# Patient Record
Sex: FEMALE | Race: OTHER | NOT HISPANIC OR LATINO | Employment: UNEMPLOYED | URBAN - METROPOLITAN AREA
[De-identification: names, ages, dates, MRNs, and addresses within clinical notes are randomized per-mention and may not be internally consistent; named-entity substitution may affect disease eponyms.]

---

## 2023-05-03 ENCOUNTER — OFFICE VISIT (OUTPATIENT)
Dept: PRIMARY CARE CLINIC | Facility: CLINIC | Age: 58
End: 2023-05-03
Payer: MEDICAID

## 2023-05-03 VITALS
TEMPERATURE: 98 F | DIASTOLIC BLOOD PRESSURE: 80 MMHG | HEIGHT: 65 IN | HEART RATE: 75 BPM | BODY MASS INDEX: 28.49 KG/M2 | RESPIRATION RATE: 16 BRPM | OXYGEN SATURATION: 98 % | SYSTOLIC BLOOD PRESSURE: 122 MMHG | WEIGHT: 171 LBS

## 2023-05-03 DIAGNOSIS — E03.9 ACQUIRED HYPOTHYROIDISM: ICD-10-CM

## 2023-05-03 DIAGNOSIS — E11.42 TYPE 2 DIABETES MELLITUS WITH DIABETIC POLYNEUROPATHY, WITH LONG-TERM CURRENT USE OF INSULIN: ICD-10-CM

## 2023-05-03 DIAGNOSIS — Z79.4 TYPE 2 DIABETES MELLITUS WITH DIABETIC POLYNEUROPATHY, WITH LONG-TERM CURRENT USE OF INSULIN: ICD-10-CM

## 2023-05-03 DIAGNOSIS — Z12.31 BREAST CANCER SCREENING BY MAMMOGRAM: Primary | ICD-10-CM

## 2023-05-03 DIAGNOSIS — G43.111 INTRACTABLE MIGRAINE WITH AURA WITH STATUS MIGRAINOSUS: ICD-10-CM

## 2023-05-03 PROBLEM — E11.618 TYPE 2 DIABETES MELLITUS WITH DIABETIC ARTHROPATHY, WITH LONG-TERM CURRENT USE OF INSULIN: Status: ACTIVE | Noted: 2023-05-03

## 2023-05-03 PROBLEM — N05.1 FOCAL SEGMENTAL GLOMERULOSCLEROSIS: Status: ACTIVE | Noted: 2023-05-03

## 2023-05-03 PROBLEM — U07.1 COVID-19: Status: ACTIVE | Noted: 2023-05-03

## 2023-05-03 PROCEDURE — 3079F PR MOST RECENT DIASTOLIC BLOOD PRESSURE 80-89 MM HG: ICD-10-PCS | Mod: CPTII,S$GLB,, | Performed by: INTERNAL MEDICINE

## 2023-05-03 PROCEDURE — 3008F BODY MASS INDEX DOCD: CPT | Mod: CPTII,S$GLB,, | Performed by: INTERNAL MEDICINE

## 2023-05-03 PROCEDURE — 1160F RVW MEDS BY RX/DR IN RCRD: CPT | Mod: CPTII,S$GLB,, | Performed by: INTERNAL MEDICINE

## 2023-05-03 PROCEDURE — 3079F DIAST BP 80-89 MM HG: CPT | Mod: CPTII,S$GLB,, | Performed by: INTERNAL MEDICINE

## 2023-05-03 PROCEDURE — 99204 OFFICE O/P NEW MOD 45 MIN: CPT | Mod: S$GLB,,, | Performed by: INTERNAL MEDICINE

## 2023-05-03 PROCEDURE — 1159F MED LIST DOCD IN RCRD: CPT | Mod: CPTII,S$GLB,, | Performed by: INTERNAL MEDICINE

## 2023-05-03 PROCEDURE — 99204 PR OFFICE/OUTPT VISIT, NEW, LEVL IV, 45-59 MIN: ICD-10-PCS | Mod: S$GLB,,, | Performed by: INTERNAL MEDICINE

## 2023-05-03 PROCEDURE — 1160F PR REVIEW ALL MEDS BY PRESCRIBER/CLIN PHARMACIST DOCUMENTED: ICD-10-PCS | Mod: CPTII,S$GLB,, | Performed by: INTERNAL MEDICINE

## 2023-05-03 PROCEDURE — 1159F PR MEDICATION LIST DOCUMENTED IN MEDICAL RECORD: ICD-10-PCS | Mod: CPTII,S$GLB,, | Performed by: INTERNAL MEDICINE

## 2023-05-03 PROCEDURE — 3074F SYST BP LT 130 MM HG: CPT | Mod: CPTII,S$GLB,, | Performed by: INTERNAL MEDICINE

## 2023-05-03 PROCEDURE — 3008F PR BODY MASS INDEX (BMI) DOCUMENTED: ICD-10-PCS | Mod: CPTII,S$GLB,, | Performed by: INTERNAL MEDICINE

## 2023-05-03 PROCEDURE — 3074F PR MOST RECENT SYSTOLIC BLOOD PRESSURE < 130 MM HG: ICD-10-PCS | Mod: CPTII,S$GLB,, | Performed by: INTERNAL MEDICINE

## 2023-05-03 PROCEDURE — 4010F PR ACE/ARB THEARPY RXD/TAKEN: ICD-10-PCS | Mod: CPTII,S$GLB,, | Performed by: INTERNAL MEDICINE

## 2023-05-03 PROCEDURE — 4010F ACE/ARB THERAPY RXD/TAKEN: CPT | Mod: CPTII,S$GLB,, | Performed by: INTERNAL MEDICINE

## 2023-05-03 RX ORDER — ESTRADIOL 1 MG/1
1 TABLET ORAL DAILY
COMMUNITY
Start: 2023-04-27 | End: 2023-05-12

## 2023-05-03 RX ORDER — HYDROCHLOROTHIAZIDE 25 MG/1
25 TABLET ORAL DAILY
COMMUNITY
Start: 2023-04-18 | End: 2023-06-28

## 2023-05-03 RX ORDER — TRAMADOL HYDROCHLORIDE 50 MG/1
1 TABLET ORAL EVERY 4 HOURS
COMMUNITY
Start: 2022-11-14 | End: 2023-05-03

## 2023-05-03 RX ORDER — VIT C/E/ZN/COPPR/LUTEIN/ZEAXAN 250MG-90MG
1 CAPSULE ORAL DAILY
COMMUNITY
End: 2024-01-25

## 2023-05-03 RX ORDER — OMEPRAZOLE 20 MG/1
20 CAPSULE, DELAYED RELEASE ORAL DAILY
COMMUNITY
Start: 2023-04-18 | End: 2023-08-25 | Stop reason: SDUPTHER

## 2023-05-03 RX ORDER — INSULIN ASPART 100 [IU]/ML
12 INJECTION, SOLUTION INTRAVENOUS; SUBCUTANEOUS
COMMUNITY
End: 2023-09-26 | Stop reason: SDUPTHER

## 2023-05-03 RX ORDER — BUSPIRONE HYDROCHLORIDE 10 MG/1
10 TABLET ORAL EVERY MORNING
COMMUNITY
Start: 2023-04-21

## 2023-05-03 RX ORDER — BENAZEPRIL HYDROCHLORIDE 40 MG/1
40 TABLET ORAL DAILY
COMMUNITY
Start: 2023-04-16

## 2023-05-03 RX ORDER — LEVOTHYROXINE SODIUM 125 UG/1
1 TABLET ORAL DAILY
COMMUNITY
Start: 2023-04-21 | End: 2023-08-23

## 2023-05-03 RX ORDER — GLIPIZIDE 5 MG/1
5 TABLET, FILM COATED, EXTENDED RELEASE ORAL DAILY
COMMUNITY
Start: 2023-04-16 | End: 2023-05-03

## 2023-05-03 RX ORDER — MONTELUKAST SODIUM 10 MG/1
1 TABLET ORAL NIGHTLY
COMMUNITY
Start: 2023-04-28

## 2023-05-03 RX ORDER — SERTRALINE HYDROCHLORIDE 100 MG/1
1 TABLET, FILM COATED ORAL DAILY
COMMUNITY
Start: 2023-04-21 | End: 2023-07-05

## 2023-05-03 RX ORDER — DULAGLUTIDE 1.5 MG/.5ML
1.5 INJECTION, SOLUTION SUBCUTANEOUS
COMMUNITY
End: 2024-01-25

## 2023-05-03 RX ORDER — LOSARTAN POTASSIUM 100 MG/1
1 TABLET ORAL DAILY
COMMUNITY
Start: 2023-04-27 | End: 2023-09-26

## 2023-05-03 RX ORDER — BUDESONIDE AND FORMOTEROL FUMARATE DIHYDRATE 160; 4.5 UG/1; UG/1
2 AEROSOL RESPIRATORY (INHALATION) 2 TIMES DAILY
COMMUNITY

## 2023-05-03 RX ORDER — METFORMIN HYDROCHLORIDE 500 MG/1
1000 TABLET, EXTENDED RELEASE ORAL 2 TIMES DAILY
COMMUNITY
Start: 2023-04-16 | End: 2023-05-03

## 2023-05-03 RX ORDER — ZINC GLUCONATE 50 MG
1 TABLET ORAL DAILY
COMMUNITY

## 2023-05-03 RX ORDER — INSULIN GLARGINE 100 [IU]/ML
30 INJECTION, SOLUTION SUBCUTANEOUS 2 TIMES DAILY
COMMUNITY
End: 2023-05-16 | Stop reason: SDUPTHER

## 2023-05-03 RX ORDER — ATORVASTATIN CALCIUM 40 MG/1
1 TABLET, FILM COATED ORAL DAILY
COMMUNITY
End: 2023-08-23

## 2023-05-03 NOTE — PROGRESS NOTES
Subjective:      Patient ID: Nati Buenrostro is a 57 y.o. female.    Chief Complaint: Establish Care ([facility])    HPI:  Patient with FSGS diagnosed in 2000.  Patient is being followed by nephrologist Dr Sanford in [place].     Patient has diabetes x 22 years.  Patient blood sugar do not seem under good control with last A1c of 10.9 in March 2023.  Patient is on Lantus 60 units + Novolog sliding scale.  Patient is also on Trulicity and glipizide.  Patient reports fasting blood sugars are running 270-320 and non-fasting BS = 300-400.  Patient reports multiple hypoglycemic episodes as well, sugars are not checked but patient reports feeling shaky, sweaty, tired and exhausted.  The symptoms improved after eating something.  The symptoms are more during the daytime specially if patient misses meals.  Patient also reports polyuria polydipsia and numbness bilateral fingers.     Patient has hypothyroidism and is on levothyroxine 125 mcg.  Thyroid levels are not available.  Patient denies tremor, weight loss, constipation, palpitation, patient reports diarrhea and having your fecal incontinence.  Patient also complains of abdominal pain and cramps.     Patient has migraine with aura and is currently taking tramadol and sumatriptan.                                       Review of Systems   Constitutional:  Positive for diaphoresis and malaise/fatigue. Negative for chills, fever and weight loss.   HENT:  Negative for congestion, ear pain, sinus pain, sore throat and tinnitus.    Eyes:  Negative for blurred vision and photophobia.   Respiratory:  Negative for cough, hemoptysis, shortness of breath and wheezing.    Cardiovascular:  Negative for chest pain, palpitations, orthopnea, leg swelling and PND.   Gastrointestinal:  Positive for diarrhea. Negative for abdominal pain, blood in stool, constipation, heartburn, melena, nausea and vomiting.   Genitourinary:  Negative for dysuria, frequency and urgency.   Musculoskeletal:   "Negative for back pain, myalgias and neck pain.   Skin:  Negative for rash.   Neurological:  Positive for dizziness. Negative for tremors, seizures, loss of consciousness and weakness.   Endo/Heme/Allergies:  Negative for polydipsia.   Psychiatric/Behavioral:  Negative for depression and hallucinations. The patient does not have insomnia.    Objective:   /80 (BP Location: Left arm, Patient Position: Sitting, BP Method: Medium (Automatic))   Pulse 75   Temp 98 °F (36.7 °C) (Temporal)   Resp 16   Ht 5' 5" (1.651 m)   Wt 77.6 kg (171 lb) Comment: without boot 168 lbs  SpO2 98%   BMI 28.46 kg/m²     Physical Exam  Constitutional:       General: She is not in acute distress.     Appearance: She is not diaphoretic.   Neck:      Thyroid: No thyromegaly.   Cardiovascular:      Rate and Rhythm: Normal rate and regular rhythm.      Heart sounds: Normal heart sounds. No murmur heard.  Pulmonary:      Effort: Pulmonary effort is normal. No respiratory distress.      Breath sounds: Normal breath sounds. No wheezing.   Abdominal:      General: Bowel sounds are normal. There is no distension.      Palpations: Abdomen is soft.      Tenderness: There is no abdominal tenderness.   Musculoskeletal:      Comments: Right foot is in Unna boot   Lymphadenopathy:      Cervical: No cervical adenopathy.   Neurological:      Mental Status: She is alert and oriented to person, place, and time.   Psychiatric:         Behavior: Behavior normal.         Thought Content: Thought content normal.         Judgment: Judgment normal.     Assessment:       ICD-10-CM ICD-9-CM   1. Breast cancer screening by mammogram  Z12.31 V76.12   2. Acquired hypothyroidism  E03.9 244.9   3. Type 2 diabetes mellitus with diabetic polyneuropathy, with long-term current use of insulin  E11.42 250.60    Z79.4 357.2     V58.67   4. Intractable migraine with aura with status migrainosus  G43.111 346.03       Plan:     Patient has diabetes with last A1c of 10.9 " suggest very poorly controlled diabetes  Will divide Lantus to 30 units b.i.d. instead of 60 once daily  Patient has multiple GI symptoms including abdominal cramps and fecal incontinence.  Will hold metformin  Will stop glipizide  Will use NovoLog 5 units before each meal and sliding scale  Will place CGM to better understand patient blood sugar pattern and adjust the dose of insulin  Patient has hypothyroidism and TSH levels are not available.  Will order thyroid function  Patient last LDL of 130 is not at goal.  Patient is on atorvastatin will continue same medicine for now  Patient has migraine that do not seem under good control.  Will discuss using topiramate for prophylaxis on next visit  Advised patient to avoid tramadol for migraine  Advised patient about multiple side effect of estradiol and will hold the medication    Medication List with Changes/Refills   Current Medications    ALBUTEROL SULFATE 90 MCG/ACTUATION AEBS    Inhale 2 puffs into the lungs daily as needed.    ATORVASTATIN (LIPITOR) 40 MG TABLET    Take 1 tablet by mouth once daily.    BENAZEPRIL (LOTENSIN) 40 MG TABLET    Take 40 mg by mouth once daily.    BUDESONIDE-FORMOTEROL 160-4.5 MCG (SYMBICORT) 160-4.5 MCG/ACTUATION HFAA    Inhale 2 puffs into the lungs 2 (two) times a day.    BUSPIRONE (BUSPAR) 10 MG TABLET    Take 10 mg by mouth every morning.    CHOLECALCIFEROL, VITAMIN D3, (VITAMIN D3) 25 MCG (1,000 UNIT) CAPSULE    Take 1 capsule by mouth once daily.    DICYCLOMINE HCL (DICYCLOMINE ORAL)    Take 1 tablet by mouth once daily.    DULAGLUTIDE (TRULICITY) 1.5 MG/0.5 ML PEN INJECTOR    Inject 1.5 mg into the skin every 7 days.    ESTRADIOL (ESTRACE) 1 MG TABLET    Take 1 tablet by mouth once daily.    GLIPIZIDE 5 MG TR24    Take 5 mg by mouth once daily.    HYDROCHLOROTHIAZIDE (HYDRODIURIL) 25 MG TABLET    Take 25 mg by mouth once daily.    INSULIN ASPART U-100 (NOVOLOG) 100 UNIT/ML (3 ML) INPN PEN    Inject into the skin. Use per  sliding scale    INSULIN GLARGINE 100 UNITS/ML SUBQ PEN    Inject 60 Units into the skin once daily.    LEVOTHYROXINE (SYNTHROID) 125 MCG TABLET    Take 1 tablet by mouth once daily.    LOSARTAN (COZAAR) 100 MG TABLET    Take 1 tablet by mouth once daily.    MAGNESIUM OXIDE 500 MG CAP    Take 1 tablet by mouth once daily.    MECLIZINE HCL (MECLIZINE ORAL)    Take 1 tablet by mouth daily as needed.    METFORMIN (GLUCOPHAGE-XR) 500 MG ER 24HR TABLET    Take 1,000 mg by mouth 2 (two) times daily.    MONTELUKAST (SINGULAIR) 10 MG TABLET    Take 1 tablet by mouth every evening.    OMEPRAZOLE (PRILOSEC) 20 MG CAPSULE    Take 20 mg by mouth once daily.    SERTRALINE (ZOLOFT) 100 MG TABLET    Take 1 tablet by mouth once daily.    SUMATRIPTAN, BULK, MISC    1 tablet by Misc.(Non-Drug; Combo Route) route as needed.    TRAMADOL (ULTRAM) 50 MG TABLET    Take 1 tablet by mouth every 4 (four) hours.

## 2023-05-12 ENCOUNTER — OFFICE VISIT (OUTPATIENT)
Dept: PRIMARY CARE CLINIC | Facility: CLINIC | Age: 58
End: 2023-05-12
Payer: MEDICAID

## 2023-05-12 VITALS
SYSTOLIC BLOOD PRESSURE: 121 MMHG | RESPIRATION RATE: 16 BRPM | HEART RATE: 70 BPM | HEIGHT: 65 IN | TEMPERATURE: 97 F | OXYGEN SATURATION: 99 % | WEIGHT: 169 LBS | BODY MASS INDEX: 28.16 KG/M2 | DIASTOLIC BLOOD PRESSURE: 80 MMHG

## 2023-05-12 DIAGNOSIS — E11.42 TYPE 2 DIABETES MELLITUS WITH DIABETIC POLYNEUROPATHY, WITH LONG-TERM CURRENT USE OF INSULIN: Primary | ICD-10-CM

## 2023-05-12 DIAGNOSIS — Z79.4 TYPE 2 DIABETES MELLITUS WITH DIABETIC POLYNEUROPATHY, WITH LONG-TERM CURRENT USE OF INSULIN: Primary | ICD-10-CM

## 2023-05-12 PROCEDURE — 4010F ACE/ARB THERAPY RXD/TAKEN: CPT | Mod: CPTII,S$GLB,, | Performed by: INTERNAL MEDICINE

## 2023-05-12 PROCEDURE — 99214 OFFICE O/P EST MOD 30 MIN: CPT | Mod: 25,S$GLB,, | Performed by: INTERNAL MEDICINE

## 2023-05-12 PROCEDURE — 3008F BODY MASS INDEX DOCD: CPT | Mod: CPTII,S$GLB,, | Performed by: INTERNAL MEDICINE

## 2023-05-12 PROCEDURE — 1160F RVW MEDS BY RX/DR IN RCRD: CPT | Mod: CPTII,S$GLB,, | Performed by: INTERNAL MEDICINE

## 2023-05-12 PROCEDURE — 1159F MED LIST DOCD IN RCRD: CPT | Mod: CPTII,S$GLB,, | Performed by: INTERNAL MEDICINE

## 2023-05-12 PROCEDURE — 3074F PR MOST RECENT SYSTOLIC BLOOD PRESSURE < 130 MM HG: ICD-10-PCS | Mod: CPTII,S$GLB,, | Performed by: INTERNAL MEDICINE

## 2023-05-12 PROCEDURE — 3008F PR BODY MASS INDEX (BMI) DOCUMENTED: ICD-10-PCS | Mod: CPTII,S$GLB,, | Performed by: INTERNAL MEDICINE

## 2023-05-12 PROCEDURE — 1159F PR MEDICATION LIST DOCUMENTED IN MEDICAL RECORD: ICD-10-PCS | Mod: CPTII,S$GLB,, | Performed by: INTERNAL MEDICINE

## 2023-05-12 PROCEDURE — 3074F SYST BP LT 130 MM HG: CPT | Mod: CPTII,S$GLB,, | Performed by: INTERNAL MEDICINE

## 2023-05-12 PROCEDURE — 99214 PR OFFICE/OUTPT VISIT, EST, LEVL IV, 30-39 MIN: ICD-10-PCS | Mod: 25,S$GLB,, | Performed by: INTERNAL MEDICINE

## 2023-05-12 PROCEDURE — 3079F PR MOST RECENT DIASTOLIC BLOOD PRESSURE 80-89 MM HG: ICD-10-PCS | Mod: CPTII,S$GLB,, | Performed by: INTERNAL MEDICINE

## 2023-05-12 PROCEDURE — 3079F DIAST BP 80-89 MM HG: CPT | Mod: CPTII,S$GLB,, | Performed by: INTERNAL MEDICINE

## 2023-05-12 PROCEDURE — 95251 CONT GLUC MNTR ANALYSIS I&R: CPT | Mod: S$GLB,,, | Performed by: INTERNAL MEDICINE

## 2023-05-12 PROCEDURE — 4010F PR ACE/ARB THEARPY RXD/TAKEN: ICD-10-PCS | Mod: CPTII,S$GLB,, | Performed by: INTERNAL MEDICINE

## 2023-05-12 PROCEDURE — 95251 PR GLUCOSE MONITOR, 72 HOUR, PHYS INTERP: ICD-10-PCS | Mod: S$GLB,,, | Performed by: INTERNAL MEDICINE

## 2023-05-12 PROCEDURE — 1160F PR REVIEW ALL MEDS BY PRESCRIBER/CLIN PHARMACIST DOCUMENTED: ICD-10-PCS | Mod: CPTII,S$GLB,, | Performed by: INTERNAL MEDICINE

## 2023-05-12 RX ORDER — DAPAGLIFLOZIN 5 MG/1
5 TABLET, FILM COATED ORAL DAILY
Qty: 30 TABLET | Refills: 0 | Status: SHIPPED | OUTPATIENT
Start: 2023-05-12 | End: 2023-06-28 | Stop reason: SINTOL

## 2023-05-12 NOTE — PROGRESS NOTES
Subjective:      Patient ID: Nati Buenrostro is a 57 y.o. female.    Chief Complaint: Follow-up (F/u requested referral to podiatry )     Patient with FSGS diagnosed in 2000.  Patient is being followed by nephrologist Dr Sanford in [place].     Patient with diabetes for last 22 years.  Patient last A1c of 10.9 suggest poorly-controlled diabetes.  Patient was evaluated in clinic about 10 days ago for the 1st time and a few changes were made.  Patient Lantus 60 units was split to 30 units twice a day, was started NovoLog 5 units before each meal + continue Trulicity + stop glipizide.  CGM was placed and the data is downloaded today and it suggest that patient blood sugars are in range 20% of the time and running high 60% of the time and very high 20% of the time.  Patient average blood sugar is 217 with standard deviation of 43.     Patient was having fecal incontinence and abdominal cramps and those have markedly improved after metformin is stopped    Review of Systems   Constitutional:  Negative for chills, diaphoresis, fever, malaise/fatigue and weight loss.   HENT:  Negative for congestion, ear pain, sinus pain, sore throat and tinnitus.    Eyes:  Negative for blurred vision and photophobia.   Respiratory:  Negative for cough, hemoptysis, shortness of breath and wheezing.    Cardiovascular:  Negative for chest pain, palpitations, orthopnea, leg swelling and PND.   Gastrointestinal:  Negative for abdominal pain, blood in stool, constipation, diarrhea, heartburn, melena, nausea and vomiting.   Genitourinary:  Negative for dysuria, frequency and urgency.   Musculoskeletal:  Negative for back pain, myalgias and neck pain.   Skin:  Negative for rash.   Neurological:  Negative for dizziness, tremors, seizures, loss of consciousness and weakness.   Endo/Heme/Allergies:  Negative for polydipsia.   Psychiatric/Behavioral:  Negative for depression and hallucinations. The patient does not have insomnia.      Objective:   BP  "121/80 (BP Location: Right arm, Patient Position: Sitting, BP Method: Medium (Automatic))   Pulse 70   Temp 97 °F (36.1 °C) (Temporal)   Resp 16   Ht 5' 5" (1.651 m)   Wt 76.7 kg (169 lb)   SpO2 99%   BMI 28.12 kg/m²     Patient not examined    Assessment:       ICD-10-CM ICD-9-CM   1. Type 2 diabetes mellitus with diabetic polyneuropathy, with long-term current use of insulin  E11.42 250.60    Z79.4 357.2     V58.67       Plan:     Patient blood sugars seem to be better controlled than before but still running high  Will increase Lantus to 32 units twice a day  Patient seem to have a spike in blood sugar at 3 p.m. after lunch.  That is patient's biggest meal  Advised patient to take 8 units of NovoLog before lunch.   Will continue 5 units before breakfast and 5 units before dinner  Will add Farxiga that will help patient better control blood sugar and may also help with weight loss  Patient is on multiple insulin injection and not think patient will be helped markedly with continuous glucose monitor  Will try to get patient dex com sensor.     Medication List with Changes/Refills   New Medications    DAPAGLIFLOZIN (FARXIGA) 5 MG TAB TABLET    Take 1 tablet (5 mg total) by mouth once daily.   Current Medications    ALBUTEROL SULFATE 90 MCG/ACTUATION AEBS    Inhale 2 puffs into the lungs daily as needed.    ATORVASTATIN (LIPITOR) 40 MG TABLET    Take 1 tablet by mouth once daily.    BENAZEPRIL (LOTENSIN) 40 MG TABLET    Take 40 mg by mouth once daily.    BUDESONIDE-FORMOTEROL 160-4.5 MCG (SYMBICORT) 160-4.5 MCG/ACTUATION HFAA    Inhale 2 puffs into the lungs 2 (two) times a day.    BUSPIRONE (BUSPAR) 10 MG TABLET    Take 10 mg by mouth every morning.    CHOLECALCIFEROL, VITAMIN D3, (VITAMIN D3) 25 MCG (1,000 UNIT) CAPSULE    Take 1 capsule by mouth once daily.    DICYCLOMINE HCL (DICYCLOMINE ORAL)    Take 1 tablet by mouth once daily.    DULAGLUTIDE (TRULICITY) 1.5 MG/0.5 ML PEN INJECTOR    Inject 1.5 mg into " the skin every 7 days.    ESTRADIOL (ESTRACE) 1 MG TABLET    Take 1 tablet by mouth once daily.    HYDROCHLOROTHIAZIDE (HYDRODIURIL) 25 MG TABLET    Take 25 mg by mouth once daily.    INSULIN ASPART U-100 (NOVOLOG) 100 UNIT/ML (3 ML) INPN PEN    Inject 5 Units into the skin 3 (three) times daily with meals.    INSULIN GLARGINE 100 UNITS/ML SUBQ PEN    Inject 30 Units into the skin 2 (two) times a day.    LEVOTHYROXINE (SYNTHROID) 125 MCG TABLET    Take 1 tablet by mouth once daily.    LOSARTAN (COZAAR) 100 MG TABLET    Take 1 tablet by mouth once daily.    MAGNESIUM OXIDE 500 MG CAP    Take 1 tablet by mouth once daily.    MECLIZINE HCL (MECLIZINE ORAL)    Take 1 tablet by mouth daily as needed.    MONTELUKAST (SINGULAIR) 10 MG TABLET    Take 1 tablet by mouth every evening.    OMEPRAZOLE (PRILOSEC) 20 MG CAPSULE    Take 20 mg by mouth once daily.    SERTRALINE (ZOLOFT) 100 MG TABLET    Take 1 tablet by mouth once daily.    SUMATRIPTAN, BULK, MISC    1 tablet by Misc.(Non-Drug; Combo Route) route as needed.

## 2023-05-16 DIAGNOSIS — Z79.4 TYPE 2 DIABETES MELLITUS WITH DIABETIC POLYNEUROPATHY, WITH LONG-TERM CURRENT USE OF INSULIN: Primary | ICD-10-CM

## 2023-05-16 DIAGNOSIS — E11.42 TYPE 2 DIABETES MELLITUS WITH DIABETIC POLYNEUROPATHY, WITH LONG-TERM CURRENT USE OF INSULIN: Primary | ICD-10-CM

## 2023-05-16 NOTE — TELEPHONE ENCOUNTER
----- Message from Anu Gomez LPN sent at 5/16/2023  4:48 PM CDT -----  Regarding: FW: medication  Contact: patient    ----- Message -----  From: Ilana Clark  Sent: 5/16/2023   4:06 PM CDT  To: Alphonse Montalvo Staff  Subject: medication                                       PT is stating she needs a refill on her insulin medictaion, it is called Cayla Jackson, return call [phone]        [facility] [address] [place] [address] - [address] [facility]  [address] [facility] [address]  Phone: [phone] Fax: [phone]

## 2023-05-17 ENCOUNTER — TELEPHONE (OUTPATIENT)
Dept: PRIMARY CARE CLINIC | Facility: CLINIC | Age: 58
End: 2023-05-17
Payer: MEDICAID

## 2023-05-17 DIAGNOSIS — E11.42 TYPE 2 DIABETES MELLITUS WITH DIABETIC POLYNEUROPATHY, WITH LONG-TERM CURRENT USE OF INSULIN: Primary | ICD-10-CM

## 2023-05-17 DIAGNOSIS — Z79.4 TYPE 2 DIABETES MELLITUS WITH DIABETIC POLYNEUROPATHY, WITH LONG-TERM CURRENT USE OF INSULIN: Primary | ICD-10-CM

## 2023-05-17 RX ORDER — INSULIN GLARGINE 100 [IU]/ML
30 INJECTION, SOLUTION SUBCUTANEOUS 2 TIMES DAILY
Qty: 18 ML | Refills: 11 | Status: SHIPPED | OUTPATIENT
Start: 2023-05-17 | End: 2023-09-25 | Stop reason: SDUPTHER

## 2023-05-19 ENCOUNTER — TELEPHONE (OUTPATIENT)
Dept: PRIMARY CARE CLINIC | Facility: CLINIC | Age: 58
End: 2023-05-19
Payer: MEDICAID

## 2023-05-19 NOTE — TELEPHONE ENCOUNTER
----- Message from Ilana Clark sent at 5/19/2023 11:29 AM CDT -----  Regarding: medication  Contact: patient  PT is calling bc she sent in a request for a refill of her insulin but the insurance is wanting to speak with a provider, she states she is out and can not go through the weekend without her insulin, return call [phone]

## 2023-05-23 ENCOUNTER — TELEPHONE (OUTPATIENT)
Dept: PRIMARY CARE CLINIC | Facility: CLINIC | Age: 58
End: 2023-05-23
Payer: MEDICAID

## 2023-05-23 ENCOUNTER — PATIENT OUTREACH (OUTPATIENT)
Dept: ADMINISTRATIVE | Facility: HOSPITAL | Age: 58
End: 2023-05-23
Payer: MEDICAID

## 2023-05-23 NOTE — TELEPHONE ENCOUNTER
Pt was called to see if she received her insulin- insulin glargine solostar sent into pharmacy-informed that the lantus solostar which is what pt states  she gets from the pharmacy will be ready to  shortly --

## 2023-05-23 NOTE — TELEPHONE ENCOUNTER
----- Message from Juani Arriaga MA sent at 5/22/2023  4:58 PM CDT -----  Contact: pt    ----- Message -----  From: Leonor Newman LPN  Sent: 5/22/2023   4:30 PM CDT  To: Juani Arriaga MA      ----- Message -----  From: Marianna Garza  Sent: 5/22/2023   3:20 PM CDT  To: Alphonse Montalvo Staff    Pt calling about referral sent to wrong number it needs go to Dr Scott Pacheco at fax number [phone].  Pt can be reached at [phone]    Thanks,

## 2023-05-23 NOTE — TELEPHONE ENCOUNTER
Returned call to patient and advised that clarification given for lantus. Pharmacy tech previously states that pa was needed, per pharmacist medication does not need pa, verbal was needed for brand name.

## 2023-05-23 NOTE — PROGRESS NOTES
Diabetic Eye Exam Report: Spoke with patient about overdue Eye Exam, Pt says thanks for the reminder I will call Dr. Bennett tomorrow. Unable to update Teams.

## 2023-05-23 NOTE — TELEPHONE ENCOUNTER
----- Message from Marianna Garza sent at 5/23/2023  2:28 PM CDT -----  Contact: pt  Pt calling for status of refill for Lantus pt has been out for few days.   Pt stated she needs a p/a and she can be reached at [phone]       [facility] [address] [place] [address] - [address] [facility]  [address] [facility] [address]  Phone: [phone] Fax: [phone]    Thanks,

## 2023-05-24 ENCOUNTER — TELEPHONE (OUTPATIENT)
Dept: PRIMARY CARE CLINIC | Facility: CLINIC | Age: 58
End: 2023-05-24
Payer: MEDICAID

## 2023-05-24 NOTE — TELEPHONE ENCOUNTER
----- Message from Allison Lorenzo MA sent at 5/23/2023  2:37 PM CDT -----  Contact: pt    ----- Message -----  From: Marianna Garza  Sent: 5/23/2023   2:31 PM CDT  To: Alphonse Montalvo Staff    Pt calling for status of refill for Lantus pt has been out for few days.   Pt stated she needs a p/a and she can be reached at [phone]       [facility] [address] [place] [address] [address] [facility]  [address] [facility] [address]  Phone: [phone] Fax: [phone]    Thanks,

## 2023-05-25 ENCOUNTER — TELEPHONE (OUTPATIENT)
Dept: PRIMARY CARE CLINIC | Facility: CLINIC | Age: 58
End: 2023-05-25
Payer: MEDICAID

## 2023-05-25 NOTE — TELEPHONE ENCOUNTER
----- Message from Anu Gomez LPN sent at 5/25/2023  3:54 PM CDT -----  Contact: Nati    ----- Message -----  From: Brittni Coleman  Sent: 5/25/2023   2:07 PM CDT  To: Alphonse Montalvo Staff    Type:  Same Day Appointment Request    Caller is requesting a same day appointment.  Caller declined first available appointment listed below.    Name of Caller: Nati  When is the first available appointment? 7/2023  Symptoms: Not feeling well since Sunday, sweaty all the time, muscle aches all over, Negative COVID 5/23/23  Best Call Back Number: [phone]  Additional Information:  Patient wants to come in tomorrow

## 2023-05-30 ENCOUNTER — TELEPHONE (OUTPATIENT)
Dept: PRIMARY CARE CLINIC | Facility: CLINIC | Age: 58
End: 2023-05-30
Payer: MEDICAID

## 2023-06-21 LAB
LEFT EYE DM RETINOPATHY: NEGATIVE
RIGHT EYE DM RETINOPATHY: NEGATIVE

## 2023-06-23 ENCOUNTER — PATIENT OUTREACH (OUTPATIENT)
Dept: ADMINISTRATIVE | Facility: HOSPITAL | Age: 58
End: 2023-06-23
Payer: MEDICAID

## 2023-06-28 ENCOUNTER — OFFICE VISIT (OUTPATIENT)
Dept: PRIMARY CARE CLINIC | Facility: CLINIC | Age: 58
End: 2023-06-28
Payer: MEDICAID

## 2023-06-28 VITALS
TEMPERATURE: 97 F | OXYGEN SATURATION: 97 % | BODY MASS INDEX: 28.96 KG/M2 | RESPIRATION RATE: 16 BRPM | SYSTOLIC BLOOD PRESSURE: 108 MMHG | WEIGHT: 173.81 LBS | HEIGHT: 65 IN | DIASTOLIC BLOOD PRESSURE: 73 MMHG | HEART RATE: 86 BPM

## 2023-06-28 DIAGNOSIS — F33.2 SEVERE EPISODE OF RECURRENT MAJOR DEPRESSIVE DISORDER, WITHOUT PSYCHOTIC FEATURES: ICD-10-CM

## 2023-06-28 DIAGNOSIS — Z86.718 HISTORY OF DVT (DEEP VEIN THROMBOSIS): ICD-10-CM

## 2023-06-28 DIAGNOSIS — N05.1 FOCAL SEGMENTAL GLOMERULOSCLEROSIS: ICD-10-CM

## 2023-06-28 DIAGNOSIS — Z79.4 TYPE 2 DIABETES MELLITUS WITH DIABETIC POLYNEUROPATHY, WITH LONG-TERM CURRENT USE OF INSULIN: Primary | ICD-10-CM

## 2023-06-28 DIAGNOSIS — E11.42 TYPE 2 DIABETES MELLITUS WITH DIABETIC POLYNEUROPATHY, WITH LONG-TERM CURRENT USE OF INSULIN: Primary | ICD-10-CM

## 2023-06-28 DIAGNOSIS — I10 PRIMARY HYPERTENSION: ICD-10-CM

## 2023-06-28 PROBLEM — R45.89 DEPRESSED MOOD: Status: ACTIVE | Noted: 2020-10-29

## 2023-06-28 PROCEDURE — 3078F PR MOST RECENT DIASTOLIC BLOOD PRESSURE < 80 MM HG: ICD-10-PCS | Mod: CPTII,S$GLB,, | Performed by: INTERNAL MEDICINE

## 2023-06-28 PROCEDURE — 4010F ACE/ARB THERAPY RXD/TAKEN: CPT | Mod: CPTII,S$GLB,, | Performed by: INTERNAL MEDICINE

## 2023-06-28 PROCEDURE — 3008F PR BODY MASS INDEX (BMI) DOCUMENTED: ICD-10-PCS | Mod: CPTII,S$GLB,, | Performed by: INTERNAL MEDICINE

## 2023-06-28 PROCEDURE — 3074F PR MOST RECENT SYSTOLIC BLOOD PRESSURE < 130 MM HG: ICD-10-PCS | Mod: CPTII,S$GLB,, | Performed by: INTERNAL MEDICINE

## 2023-06-28 PROCEDURE — 99215 OFFICE O/P EST HI 40 MIN: CPT | Mod: S$GLB,,, | Performed by: INTERNAL MEDICINE

## 2023-06-28 PROCEDURE — 99215 PR OFFICE/OUTPT VISIT, EST, LEVL V, 40-54 MIN: ICD-10-PCS | Mod: S$GLB,,, | Performed by: INTERNAL MEDICINE

## 2023-06-28 PROCEDURE — 3008F BODY MASS INDEX DOCD: CPT | Mod: CPTII,S$GLB,, | Performed by: INTERNAL MEDICINE

## 2023-06-28 PROCEDURE — 3078F DIAST BP <80 MM HG: CPT | Mod: CPTII,S$GLB,, | Performed by: INTERNAL MEDICINE

## 2023-06-28 PROCEDURE — 1160F RVW MEDS BY RX/DR IN RCRD: CPT | Mod: CPTII,S$GLB,, | Performed by: INTERNAL MEDICINE

## 2023-06-28 PROCEDURE — 1160F PR REVIEW ALL MEDS BY PRESCRIBER/CLIN PHARMACIST DOCUMENTED: ICD-10-PCS | Mod: CPTII,S$GLB,, | Performed by: INTERNAL MEDICINE

## 2023-06-28 PROCEDURE — 3074F SYST BP LT 130 MM HG: CPT | Mod: CPTII,S$GLB,, | Performed by: INTERNAL MEDICINE

## 2023-06-28 PROCEDURE — 4010F PR ACE/ARB THEARPY RXD/TAKEN: ICD-10-PCS | Mod: CPTII,S$GLB,, | Performed by: INTERNAL MEDICINE

## 2023-06-28 PROCEDURE — 1159F MED LIST DOCD IN RCRD: CPT | Mod: CPTII,S$GLB,, | Performed by: INTERNAL MEDICINE

## 2023-06-28 PROCEDURE — 1159F PR MEDICATION LIST DOCUMENTED IN MEDICAL RECORD: ICD-10-PCS | Mod: CPTII,S$GLB,, | Performed by: INTERNAL MEDICINE

## 2023-06-28 RX ORDER — SUMATRIPTAN SUCCINATE 100 MG/1
100 TABLET ORAL
COMMUNITY
End: 2024-03-27

## 2023-06-28 RX ORDER — ARIPIPRAZOLE 5 MG/1
5 TABLET ORAL DAILY
Qty: 30 TABLET | Refills: 11 | Status: SHIPPED | OUTPATIENT
Start: 2023-06-28 | End: 2023-08-23

## 2023-06-28 NOTE — PROGRESS NOTES
"  Subjective:      Patient ID: Nati Buenrostro is a 57 y.o. female.    Chief Complaint: Medication Problem (Pt stated "farxiga caused severe body aches so not taking the med because of the side effect")    HPI: Patient with diabetes for last 22 years.  Patient last A1c of 10.9 suggest poorly-controlled diabetes.  Patient is taking Lantus 30 units twice a day + NovoLog 5 units before each meal.  Trulicity 1.5 mg and was given Farxiga but patient did not tolerate the last medication.  Patient reports diffuse body aches with the medication and so the medicine was stopped.  Patient reports fasting blood sugars are still running high 200-270 and non-fasting 220-280.  Dex com sensor was ordered for patient but has not yet received it.  Patient has talked to CGM rep and the device will need approval from insurance.     Patient blood pressure seem under good control rather on Lower side. Patient reports occasionally feels dizzy.     Patient reports depression is not under good control.  Patient still has crying spells, lack of energy/interest, has feeling of guilt and worthlessness, patient reports some suicidal ideation, ( patient was planning to cut her wrist).  Patient lives with her son.     Review of Systems   Constitutional:  Negative for chills, diaphoresis, fever, malaise/fatigue and weight loss (4 lb weight gain).   HENT:  Negative for congestion, ear pain, sinus pain, sore throat and tinnitus.    Eyes:  Negative for blurred vision and photophobia.   Respiratory:  Negative for cough, hemoptysis, shortness of breath and wheezing.    Cardiovascular:  Negative for chest pain, palpitations, orthopnea, leg swelling and PND.   Gastrointestinal:  Negative for abdominal pain, blood in stool, constipation, diarrhea, heartburn, melena, nausea and vomiting.   Genitourinary:  Negative for dysuria, frequency and urgency.   Musculoskeletal:  Negative for back pain, myalgias and neck pain.   Skin:  Negative for rash. " "  Neurological:  Negative for dizziness, tremors, seizures, loss of consciousness and weakness.   Endo/Heme/Allergies:  Negative for polydipsia.   Psychiatric/Behavioral:  Positive for depression and suicidal ideas. Negative for hallucinations. The patient does not have insomnia.      Objective:   /73 (BP Location: Left arm, Patient Position: Sitting, BP Method: Medium (Automatic))   Pulse 86   Temp 97 °F (36.1 °C) (Temporal)   Resp 16   Ht 5' 5" (1.651 m)   Wt 78.8 kg (173 lb 12.8 oz)   SpO2 97%   BMI 28.92 kg/m²     Physical Exam  Constitutional:       General: She is not in acute distress.  Neck:      Thyroid: No thyromegaly.   Cardiovascular:      Rate and Rhythm: Normal rate and regular rhythm.      Heart sounds: Normal heart sounds. No murmur heard.  Pulmonary:      Effort: Pulmonary effort is normal. No respiratory distress.      Breath sounds: Normal breath sounds. No wheezing.   Abdominal:      General: Bowel sounds are normal. There is no distension.      Palpations: Abdomen is soft.      Tenderness: There is no abdominal tenderness.   Musculoskeletal:      Comments: Patient has Unna boot on right foot   Lymphadenopathy:      Cervical: No cervical adenopathy.   Neurological:      Mental Status: She is alert and oriented to person, place, and time.      Comments: Patient seems depressed crying during interview   Psychiatric:         Behavior: Behavior normal.         Thought Content: Thought content normal.         Judgment: Judgment normal.     Assessment:       ICD-10-CM ICD-9-CM   1. Type 2 diabetes mellitus with diabetic polyneuropathy, with long-term current use of insulin  E11.42 250.60    Z79.4 357.2     V58.67   2. History of DVT (deep vein thrombosis)  Z86.718 V12.51   3. Severe episode of recurrent major depressive disorder, without psychotic features  F33.2 296.33   4. Focal segmental glomerulosclerosis  N05.1 582.1   5. Primary hypertension  I10 401.9       Plan:     Patient last " "A1c of 10.9 suggest poorly-controlled diabetes  Patient blood sugar still running high.  Will increase Lantus to 34 units twice a day + will increase pre meal insulin/NovoLog to 8 units before each meal.  Patient has use Jardiance in past with much help.  Will restart the medication.  Patient is on losartan and benazepril + hydrochlorothiazide.   Patient has symptoms suggestive of hypotension with dizziness and recommended to stop hydrochlorothiazide  Also advised patient losartan and benazepril should not be taken together  Patient will talk to her nephrologist Dr. Troy before stopping the medicine.   Patient has history of DVT that was treated with anticoagulation for 6 months.  Patient is quite depressed + patient lost her father 3 years ago and still misses him  Patient repeatedly says he was "everything" for her  Patient expressed plans to hurt herself in past.  Will stop Zoloft  Will start on Abilify.  Patient lives were her son and is very close to a friend who lives nearby Ms. Trena Hagen.  I talked to Ms. Hagen and she promised that she will be with patient most of the time.  And if she is not with the patient, patient's son will be with her.  Patient says she is not planning to home herself and at this time does not need to be admitted.  Patient is planning to visit family with her mother and expect that will be helpful.      Medication List with Changes/Refills   New Medications    ARIPIPRAZOLE (ABILIFY) 5 MG TAB    Take 1 tablet (5 mg total) by mouth once daily.    EMPAGLIFLOZIN (JARDIANCE) 10 MG TABLET    Take 1 tablet (10 mg total) by mouth once daily.   Current Medications    ALBUTEROL SULFATE 90 MCG/ACTUATION AEBS    Inhale 2 puffs into the lungs daily as needed.    ATORVASTATIN (LIPITOR) 40 MG TABLET    Take 1 tablet by mouth once daily.    BENAZEPRIL (LOTENSIN) 40 MG TABLET    Take 40 mg by mouth once daily.    BUDESONIDE-FORMOTEROL 160-4.5 MCG (SYMBICORT) 160-4.5 MCG/ACTUATION HFAA    Inhale 2 " puffs into the lungs 2 (two) times a day.    BUSPIRONE (BUSPAR) 10 MG TABLET    Take 10 mg by mouth every morning.    CHOLECALCIFEROL, VITAMIN D3, (VITAMIN D3) 25 MCG (1,000 UNIT) CAPSULE    Take 1 capsule by mouth once daily.    DICYCLOMINE HCL (DICYCLOMINE ORAL)    Take 1 tablet by mouth once daily.    DULAGLUTIDE (TRULICITY) 1.5 MG/0.5 ML PEN INJECTOR    Inject 1.5 mg into the skin every 7 days.    INSULIN ASPART U-100 (NOVOLOG) 100 UNIT/ML (3 ML) INPN PEN    Inject 5 Units into the skin 3 (three) times daily with meals.    INSULIN GLARGINE 100 UNITS/ML SUBQ PEN    Inject 30 Units into the skin 2 (two) times a day.    LEVOTHYROXINE (SYNTHROID) 125 MCG TABLET    Take 1 tablet by mouth once daily.    LOSARTAN (COZAAR) 100 MG TABLET    Take 1 tablet by mouth once daily.    MAGNESIUM OXIDE 500 MG CAP    Take 1 tablet by mouth once daily.    MECLIZINE HCL (MECLIZINE ORAL)    Take 1 tablet by mouth daily as needed.    MONTELUKAST (SINGULAIR) 10 MG TABLET    Take 1 tablet by mouth every evening.    OMEPRAZOLE (PRILOSEC) 20 MG CAPSULE    Take 20 mg by mouth once daily.    SERTRALINE (ZOLOFT) 100 MG TABLET    Take 1 tablet by mouth once daily.    SUMATRIPTAN (IMITREX) 100 MG TABLET    Take 100 mg by mouth. Take one tablet as needed no more than 2 daily   Discontinued Medications    DAPAGLIFLOZIN (FARXIGA) 5 MG TAB TABLET    Take 1 tablet (5 mg total) by mouth once daily.    HYDROCHLOROTHIAZIDE (HYDRODIURIL) 25 MG TABLET    Take 25 mg by mouth once daily.    SUMATRIPTAN, BULK, MISC    1 tablet by Misc.(Non-Drug; Combo Route) route as needed.

## 2023-07-05 ENCOUNTER — OFFICE VISIT (OUTPATIENT)
Dept: PRIMARY CARE CLINIC | Facility: CLINIC | Age: 58
End: 2023-07-05
Payer: MEDICAID

## 2023-07-05 ENCOUNTER — PATIENT MESSAGE (OUTPATIENT)
Dept: ADMINISTRATIVE | Facility: HOSPITAL | Age: 58
End: 2023-07-05
Payer: MEDICAID

## 2023-07-05 VITALS
RESPIRATION RATE: 16 BRPM | DIASTOLIC BLOOD PRESSURE: 75 MMHG | HEART RATE: 83 BPM | WEIGHT: 176.81 LBS | HEIGHT: 65 IN | TEMPERATURE: 97 F | BODY MASS INDEX: 29.46 KG/M2 | SYSTOLIC BLOOD PRESSURE: 116 MMHG | OXYGEN SATURATION: 98 %

## 2023-07-05 DIAGNOSIS — F33.2 SEVERE EPISODE OF RECURRENT MAJOR DEPRESSIVE DISORDER, WITHOUT PSYCHOTIC FEATURES: ICD-10-CM

## 2023-07-05 DIAGNOSIS — E11.42 TYPE 2 DIABETES MELLITUS WITH DIABETIC POLYNEUROPATHY, WITH LONG-TERM CURRENT USE OF INSULIN: Primary | ICD-10-CM

## 2023-07-05 DIAGNOSIS — Z79.4 TYPE 2 DIABETES MELLITUS WITH DIABETIC POLYNEUROPATHY, WITH LONG-TERM CURRENT USE OF INSULIN: Primary | ICD-10-CM

## 2023-07-05 DIAGNOSIS — I10 PRIMARY HYPERTENSION: ICD-10-CM

## 2023-07-05 PROCEDURE — 4010F ACE/ARB THERAPY RXD/TAKEN: CPT | Mod: CPTII,S$GLB,, | Performed by: INTERNAL MEDICINE

## 2023-07-05 PROCEDURE — 95251 CONT GLUC MNTR ANALYSIS I&R: CPT | Mod: S$GLB,,, | Performed by: INTERNAL MEDICINE

## 2023-07-05 PROCEDURE — 3074F SYST BP LT 130 MM HG: CPT | Mod: CPTII,S$GLB,, | Performed by: INTERNAL MEDICINE

## 2023-07-05 PROCEDURE — 3074F PR MOST RECENT SYSTOLIC BLOOD PRESSURE < 130 MM HG: ICD-10-PCS | Mod: CPTII,S$GLB,, | Performed by: INTERNAL MEDICINE

## 2023-07-05 PROCEDURE — 1160F PR REVIEW ALL MEDS BY PRESCRIBER/CLIN PHARMACIST DOCUMENTED: ICD-10-PCS | Mod: CPTII,S$GLB,, | Performed by: INTERNAL MEDICINE

## 2023-07-05 PROCEDURE — 95251 PR GLUCOSE MONITOR, 72 HOUR, PHYS INTERP: ICD-10-PCS | Mod: S$GLB,,, | Performed by: INTERNAL MEDICINE

## 2023-07-05 PROCEDURE — 3078F DIAST BP <80 MM HG: CPT | Mod: CPTII,S$GLB,, | Performed by: INTERNAL MEDICINE

## 2023-07-05 PROCEDURE — 4010F PR ACE/ARB THEARPY RXD/TAKEN: ICD-10-PCS | Mod: CPTII,S$GLB,, | Performed by: INTERNAL MEDICINE

## 2023-07-05 PROCEDURE — 99214 OFFICE O/P EST MOD 30 MIN: CPT | Mod: S$GLB,,, | Performed by: INTERNAL MEDICINE

## 2023-07-05 PROCEDURE — 3008F BODY MASS INDEX DOCD: CPT | Mod: CPTII,S$GLB,, | Performed by: INTERNAL MEDICINE

## 2023-07-05 PROCEDURE — 1159F PR MEDICATION LIST DOCUMENTED IN MEDICAL RECORD: ICD-10-PCS | Mod: CPTII,S$GLB,, | Performed by: INTERNAL MEDICINE

## 2023-07-05 PROCEDURE — 1159F MED LIST DOCD IN RCRD: CPT | Mod: CPTII,S$GLB,, | Performed by: INTERNAL MEDICINE

## 2023-07-05 PROCEDURE — 3008F PR BODY MASS INDEX (BMI) DOCUMENTED: ICD-10-PCS | Mod: CPTII,S$GLB,, | Performed by: INTERNAL MEDICINE

## 2023-07-05 PROCEDURE — 3078F PR MOST RECENT DIASTOLIC BLOOD PRESSURE < 80 MM HG: ICD-10-PCS | Mod: CPTII,S$GLB,, | Performed by: INTERNAL MEDICINE

## 2023-07-05 PROCEDURE — 1160F RVW MEDS BY RX/DR IN RCRD: CPT | Mod: CPTII,S$GLB,, | Performed by: INTERNAL MEDICINE

## 2023-07-05 PROCEDURE — 99214 PR OFFICE/OUTPT VISIT, EST, LEVL IV, 30-39 MIN: ICD-10-PCS | Mod: S$GLB,,, | Performed by: INTERNAL MEDICINE

## 2023-07-05 NOTE — PROGRESS NOTES
Subjective:      Patient ID: Nati Buenrostro is a 57 y.o. female.    Chief Complaint: No chief complaint on file.    HPI:  Patient with diabetes with last A1c of 10.9 suggest very poorly controlled diabetes.  Patient is now taking Lantus 34 units twice a day and NovoLog 8 units before each meal along with Trulicity 1.5 mg and Farxiga.  CGM was placed to understand patient blood sugar patterns.  CGM data suggest that patient average blood sugar is 282 with standard deviation of 82.  Patient blood sugars are in range 10% of the time only and are running high 32% of the time and very high 58% of the time.  Patient reports adherence with diet.  Patient blood pressures are under good control    Patient on last visit was found to be quite depressed and was having crying spell and suicidal ideation.  Patient Zoloft was stopped and was started on Abilify.  Patient today reports symptoms are markedly improved.  No crying spell, lack of energy/interest, no feeling of guilt or worthlessness, no suicidal homicidal ideation.  Patient reports that family including mom + sister and son are actively helping her with depression and keeping her busy.       Review of Systems   Constitutional:  Positive for diaphoresis. Negative for chills, fever, malaise/fatigue and weight loss.   HENT:  Negative for congestion, ear pain, sinus pain, sore throat and tinnitus.    Eyes:  Negative for blurred vision and photophobia.   Respiratory:  Negative for cough, hemoptysis, shortness of breath and wheezing.    Cardiovascular:  Negative for chest pain, palpitations, orthopnea, leg swelling and PND.   Gastrointestinal:  Negative for abdominal pain, blood in stool, constipation, diarrhea, heartburn, melena, nausea and vomiting.   Genitourinary:  Negative for dysuria, frequency and urgency.   Musculoskeletal:  Negative for back pain, myalgias and neck pain.   Skin:  Negative for rash.   Neurological:  Negative for dizziness, tremors, seizures, loss of  "consciousness and weakness.   Endo/Heme/Allergies:  Negative for polydipsia.   Psychiatric/Behavioral:  Negative for depression and hallucinations. The patient does not have insomnia.    Objective:   /75 (BP Location: Right arm, Patient Position: Sitting, BP Method: Medium (Automatic))   Pulse 83   Temp 97.2 °F (36.2 °C) (Temporal)   Resp 16   Ht 5' 5" (1.651 m)   Wt 80.2 kg (176 lb 12.8 oz)   SpO2 98%   BMI 29.42 kg/m²     Physical Exam:  Patient not examined    Assessment:       ICD-10-CM ICD-9-CM   1. Type 2 diabetes mellitus with diabetic polyneuropathy, with long-term current use of insulin  E11.42 250.60    Z79.4 357.2     V58.67   2. Severe episode of recurrent major depressive disorder, without psychotic features  F33.2 296.33   3. Primary hypertension  I10 401.9       Plan:     Patient last A1c of 10.9 suggest poorly-controlled diabetes.  Will repeat labs.  Will increase Lantus from 34 units twice a day to 38 units twice a day  Increase pre meal insulin from 8-12 units.   Advised patient to continue to monitor her blood sugars and bring log  Patient blood pressures are under good control  Patient was evaluated on last visit with severe depression and suicidal ideation.  Patient symptoms seem to be markedly improved  Will continue Abilify for now    Medication List with Changes/Refills   Current Medications    ALBUTEROL SULFATE 90 MCG/ACTUATION AEBS    Inhale 2 puffs into the lungs daily as needed.    ARIPIPRAZOLE (ABILIFY) 5 MG TAB    Take 1 tablet (5 mg total) by mouth once daily.    ATORVASTATIN (LIPITOR) 40 MG TABLET    Take 1 tablet by mouth once daily.    BENAZEPRIL (LOTENSIN) 40 MG TABLET    Take 40 mg by mouth once daily.    BUDESONIDE-FORMOTEROL 160-4.5 MCG (SYMBICORT) 160-4.5 MCG/ACTUATION HFAA    Inhale 2 puffs into the lungs 2 (two) times a day.    BUSPIRONE (BUSPAR) 10 MG TABLET    Take 10 mg by mouth every morning.    CHOLECALCIFEROL, VITAMIN D3, (VITAMIN D3) 25 MCG (1,000 UNIT) " CAPSULE    Take 1 capsule by mouth once daily.    DICYCLOMINE HCL (DICYCLOMINE ORAL)    Take 1 tablet by mouth once daily.    DULAGLUTIDE (TRULICITY) 1.5 MG/0.5 ML PEN INJECTOR    Inject 1.5 mg into the skin every 7 days.    EMPAGLIFLOZIN (JARDIANCE) 10 MG TABLET    Take 1 tablet (10 mg total) by mouth once daily.    INSULIN ASPART U-100 (NOVOLOG) 100 UNIT/ML (3 ML) INPN PEN    Inject 8 Units into the skin 3 (three) times daily with meals.    INSULIN GLARGINE 100 UNITS/ML SUBQ PEN    Inject 30 Units into the skin 2 (two) times a day.    LEVOTHYROXINE (SYNTHROID) 125 MCG TABLET    Take 1 tablet by mouth once daily.    LOSARTAN (COZAAR) 100 MG TABLET    Take 1 tablet by mouth once daily.    MAGNESIUM OXIDE 500 MG CAP    Take 1 tablet by mouth once daily.    MECLIZINE HCL (MECLIZINE ORAL)    Take 1 tablet by mouth daily as needed.    MONTELUKAST (SINGULAIR) 10 MG TABLET    Take 1 tablet by mouth every evening.    OMEPRAZOLE (PRILOSEC) 20 MG CAPSULE    Take 20 mg by mouth once daily.    SUMATRIPTAN (IMITREX) 100 MG TABLET    Take 100 mg by mouth. Take one tablet as needed no more than 2 daily   Discontinued Medications    SERTRALINE (ZOLOFT) 100 MG TABLET    Take 1 tablet by mouth once daily.

## 2023-07-07 LAB
ABS NRBC COUNT: 0 THOU/UL (ref 0–0.01)
ABSOLUTE BASOPHIL: 0.1 10*3/UL (ref 0–0.3)
ABSOLUTE EOSINOPHIL: 0.2 10*3/UL (ref 0–0.6)
ABSOLUTE IMMATURE GRAN: 0.02 THOU/UL (ref 0–0.03)
ABSOLUTE LYMPHOCYTE: 1.7 10*3/UL (ref 1.2–4)
ABSOLUTE MONOCYTE: 0.4 10*3/UL (ref 0.1–0.8)
ALBUMIN SERPL BCP-MCNC: 3.8 G/DL (ref 3.4–5)
ALP SERPL-CCNC: 68 U/L (ref 45–117)
ALT SERPL W P-5'-P-CCNC: 39 U/L (ref 13–56)
ANION GAP SERPL CALC-SCNC: 5 MMOL/L (ref 3–11)
AST SERPL-CCNC: 18 U/L (ref 15–37)
BASOPHILS NFR BLD: 0.8 % (ref 0–3)
BILIRUB SERPL-MCNC: 0.2 MG/DL (ref 0.2–1)
BUN SERPL-MCNC: 37 MG/DL (ref 7–18)
BUN/CREAT SERPL: 31.89 RATIO
CALCIUM SERPL-MCNC: 9.8 MG/DL (ref 8.5–10.1)
CHLORIDE SERPL-SCNC: 99 MMOL/L (ref 98–107)
CHOLEST SERPL-MSCNC: 251 MG/DL
CO2 SERPL-SCNC: 30 MMOL/L (ref 21–32)
CREAT SERPL-MCNC: 1.16 MG/DL (ref 0.55–1.02)
CREATININE, URINE: 113.1 MG/DL (ref 10–175)
EOSINOPHIL NFR BLD: 2.7 % (ref 0–6)
ERYTHROCYTE [DISTWIDTH] IN BLOOD BY AUTOMATED COUNT: 13.2 % (ref 0–15.5)
ESTIMATED AVG GLUCOSE: 289 MG/DL
GFR ESTIMATION: 59
GLUCOSE SERPL-MCNC: 186 MG/DL (ref 74–106)
HBA1C MFR BLD: 10.3 % (ref 4.2–6.3)
HCT VFR BLD AUTO: 40.6 % (ref 37–47)
HDLC SERPL-MCNC: 71 MG/DL
HGB BLD-MCNC: 12.7 G/DL (ref 12–16)
IMMATURE GRANULOCYTES: 0.3 % (ref 0–0.43)
LDLC SERPL CALC-MCNC: 137 MG/DL
LYMPHOCYTES NFR BLD: 26.1 % (ref 20–45)
MCH RBC QN AUTO: 25 PG (ref 27–32)
MCHC RBC AUTO-ENTMCNC: 31.3 % (ref 32–36)
MCV RBC AUTO: 79.9 FL (ref 80–99)
MICROALBUMIN URINE: 761.4 MG/L (ref 0–20)
MICROALBUMIN/CREATININE RATIO: 673 MG/G (ref 0–30)
MONOCYTES NFR BLD: 6.6 % (ref 2–10)
NEUTROPHILS # BLD AUTO: 4.1 10*3/UL (ref 1.4–7)
NEUTROPHILS NFR BLD: 63.5 % (ref 50–80)
NUCLEATED RED BLOOD CELLS: 0 % (ref 0–0.2)
PLATELETS: 350 10*3/UL (ref 130–400)
PMV BLD AUTO: 10.5 FL (ref 9.2–12.2)
POTASSIUM SERPL-SCNC: 4.2 MMOL/L (ref 3.5–5.1)
PROT SERPL-MCNC: 8 G/DL (ref 6.4–8.2)
RBC # BLD AUTO: 5.08 10*6/UL (ref 4.2–5.4)
SODIUM BLD-SCNC: 134 MMOL/L (ref 131–143)
TRIGL SERPL-MCNC: 215 MG/DL (ref 0–149)
TSH SERPL DL<=0.005 MIU/L-ACNC: 5.05 UIU/ML (ref 0.36–3.74)
VLDL CHOLESTEROL: 43 MG/DL
WBC # BLD: 6.4 10*3/UL (ref 4.5–10)

## 2023-08-23 ENCOUNTER — OFFICE VISIT (OUTPATIENT)
Dept: PRIMARY CARE CLINIC | Facility: CLINIC | Age: 58
End: 2023-08-23
Payer: MEDICAID

## 2023-08-23 VITALS
WEIGHT: 173.38 LBS | TEMPERATURE: 98 F | HEART RATE: 76 BPM | HEIGHT: 65 IN | BODY MASS INDEX: 28.89 KG/M2 | SYSTOLIC BLOOD PRESSURE: 116 MMHG | RESPIRATION RATE: 16 BRPM | DIASTOLIC BLOOD PRESSURE: 80 MMHG | OXYGEN SATURATION: 96 %

## 2023-08-23 DIAGNOSIS — F33.2 SEVERE EPISODE OF RECURRENT MAJOR DEPRESSIVE DISORDER, WITHOUT PSYCHOTIC FEATURES: ICD-10-CM

## 2023-08-23 DIAGNOSIS — E78.2 MIXED HYPERLIPIDEMIA: ICD-10-CM

## 2023-08-23 DIAGNOSIS — Z79.4 TYPE 2 DIABETES MELLITUS WITH DIABETIC POLYNEUROPATHY, WITH LONG-TERM CURRENT USE OF INSULIN: Primary | ICD-10-CM

## 2023-08-23 DIAGNOSIS — E03.9 ACQUIRED HYPOTHYROIDISM: ICD-10-CM

## 2023-08-23 DIAGNOSIS — I10 PRIMARY HYPERTENSION: ICD-10-CM

## 2023-08-23 DIAGNOSIS — G47.00 INSOMNIA, UNSPECIFIED TYPE: ICD-10-CM

## 2023-08-23 DIAGNOSIS — E11.42 TYPE 2 DIABETES MELLITUS WITH DIABETIC POLYNEUROPATHY, WITH LONG-TERM CURRENT USE OF INSULIN: Primary | ICD-10-CM

## 2023-08-23 PROCEDURE — 3079F DIAST BP 80-89 MM HG: CPT | Mod: CPTII,S$GLB,, | Performed by: INTERNAL MEDICINE

## 2023-08-23 PROCEDURE — 1159F MED LIST DOCD IN RCRD: CPT | Mod: CPTII,S$GLB,, | Performed by: INTERNAL MEDICINE

## 2023-08-23 PROCEDURE — 4010F PR ACE/ARB THEARPY RXD/TAKEN: ICD-10-PCS | Mod: CPTII,S$GLB,, | Performed by: INTERNAL MEDICINE

## 2023-08-23 PROCEDURE — 95251 CONT GLUC MNTR ANALYSIS I&R: CPT | Mod: S$GLB,,, | Performed by: INTERNAL MEDICINE

## 2023-08-23 PROCEDURE — 99214 PR OFFICE/OUTPT VISIT, EST, LEVL IV, 30-39 MIN: ICD-10-PCS | Mod: S$GLB,,, | Performed by: INTERNAL MEDICINE

## 2023-08-23 PROCEDURE — 1159F PR MEDICATION LIST DOCUMENTED IN MEDICAL RECORD: ICD-10-PCS | Mod: CPTII,S$GLB,, | Performed by: INTERNAL MEDICINE

## 2023-08-23 PROCEDURE — 3046F PR MOST RECENT HEMOGLOBIN A1C LEVEL > 9.0%: ICD-10-PCS | Mod: CPTII,S$GLB,, | Performed by: INTERNAL MEDICINE

## 2023-08-23 PROCEDURE — 3066F PR DOCUMENTATION OF TREATMENT FOR NEPHROPATHY: ICD-10-PCS | Mod: CPTII,S$GLB,, | Performed by: INTERNAL MEDICINE

## 2023-08-23 PROCEDURE — 1160F RVW MEDS BY RX/DR IN RCRD: CPT | Mod: CPTII,S$GLB,, | Performed by: INTERNAL MEDICINE

## 2023-08-23 PROCEDURE — 3008F PR BODY MASS INDEX (BMI) DOCUMENTED: ICD-10-PCS | Mod: CPTII,S$GLB,, | Performed by: INTERNAL MEDICINE

## 2023-08-23 PROCEDURE — 3062F POS MACROALBUMINURIA REV: CPT | Mod: CPTII,S$GLB,, | Performed by: INTERNAL MEDICINE

## 2023-08-23 PROCEDURE — 3008F BODY MASS INDEX DOCD: CPT | Mod: CPTII,S$GLB,, | Performed by: INTERNAL MEDICINE

## 2023-08-23 PROCEDURE — 3062F PR POS MACROALBUMINURIA RESULT DOCUMENTED/REVIEW: ICD-10-PCS | Mod: CPTII,S$GLB,, | Performed by: INTERNAL MEDICINE

## 2023-08-23 PROCEDURE — 3074F PR MOST RECENT SYSTOLIC BLOOD PRESSURE < 130 MM HG: ICD-10-PCS | Mod: CPTII,S$GLB,, | Performed by: INTERNAL MEDICINE

## 2023-08-23 PROCEDURE — 3079F PR MOST RECENT DIASTOLIC BLOOD PRESSURE 80-89 MM HG: ICD-10-PCS | Mod: CPTII,S$GLB,, | Performed by: INTERNAL MEDICINE

## 2023-08-23 PROCEDURE — 3066F NEPHROPATHY DOC TX: CPT | Mod: CPTII,S$GLB,, | Performed by: INTERNAL MEDICINE

## 2023-08-23 PROCEDURE — 4010F ACE/ARB THERAPY RXD/TAKEN: CPT | Mod: CPTII,S$GLB,, | Performed by: INTERNAL MEDICINE

## 2023-08-23 PROCEDURE — 99214 OFFICE O/P EST MOD 30 MIN: CPT | Mod: S$GLB,,, | Performed by: INTERNAL MEDICINE

## 2023-08-23 PROCEDURE — 2023F DILAT RTA XM W/O RTNOPTHY: CPT | Mod: CPTII,S$GLB,, | Performed by: INTERNAL MEDICINE

## 2023-08-23 PROCEDURE — 3046F HEMOGLOBIN A1C LEVEL >9.0%: CPT | Mod: CPTII,S$GLB,, | Performed by: INTERNAL MEDICINE

## 2023-08-23 PROCEDURE — 3074F SYST BP LT 130 MM HG: CPT | Mod: CPTII,S$GLB,, | Performed by: INTERNAL MEDICINE

## 2023-08-23 PROCEDURE — 1160F PR REVIEW ALL MEDS BY PRESCRIBER/CLIN PHARMACIST DOCUMENTED: ICD-10-PCS | Mod: CPTII,S$GLB,, | Performed by: INTERNAL MEDICINE

## 2023-08-23 PROCEDURE — 2023F PR DILATED RETINAL EXAM W/O EVID OF RETINOPATHY: ICD-10-PCS | Mod: CPTII,S$GLB,, | Performed by: INTERNAL MEDICINE

## 2023-08-23 PROCEDURE — 95251 PR GLUCOSE MONITOR, 72 HOUR, PHYS INTERP: ICD-10-PCS | Mod: S$GLB,,, | Performed by: INTERNAL MEDICINE

## 2023-08-23 RX ORDER — LEVOTHYROXINE SODIUM 150 UG/1
150 TABLET ORAL
Qty: 30 TABLET | Refills: 11 | Status: SHIPPED | OUTPATIENT
Start: 2023-08-23 | End: 2024-08-22

## 2023-08-23 RX ORDER — SERTRALINE HYDROCHLORIDE 100 MG/1
100 TABLET, FILM COATED ORAL DAILY
Qty: 30 TABLET | Refills: 11 | Status: SHIPPED | OUTPATIENT
Start: 2023-08-23 | End: 2023-08-23

## 2023-08-23 RX ORDER — ATORVASTATIN CALCIUM 80 MG/1
80 TABLET, FILM COATED ORAL DAILY
Qty: 90 TABLET | Refills: 3 | Status: SHIPPED | OUTPATIENT
Start: 2023-08-23 | End: 2023-10-25

## 2023-08-23 RX ORDER — TRAZODONE HYDROCHLORIDE 50 MG/1
50 TABLET ORAL NIGHTLY
Qty: 30 TABLET | Refills: 0 | Status: SHIPPED | OUTPATIENT
Start: 2023-08-23 | End: 2023-10-25

## 2023-08-23 NOTE — PROGRESS NOTES
Subjective:      Patient ID: Nati Buenrostro is a 57 y.o. female.    Chief Complaint: Diabetes (F/u )    HPI:  Patient with diabetes with last A1c of 10.3 improved from 10.9 but still very poorly controlled.  Patient is taking Lantus 38 units twice a day and NovoLog 8-12 units before each meal.  Patient has CGM and the data is downloaded patient average blood sugar is running to 14 with standard deviation of 56.  Patient blood sugars are in range 29% of the time and running high 52% of the time and very high 19% of the time.  Patient reports adherence with diet.  Patient blood pressure seem under good control    Patient has depression and reported crying spell specially after losing her father.  Patient also on last visit expressed suicidal ideation and so Zoloft was stopped and was started on Abilify.  Patient reports depression is under okay control but patient is not sleeping well at night.  Patient still has some crying spell but no lack of energy/interest, no feeling of guilt or worthlessness, no suicidal homicidal ideation.  Patient request starting Zoloft again.    Patient has focal segmental glomerulosclerosis and is being followed by nephrologist in [place] and is on losartan and benazepril.  Patient last potassium was in normal range.  Patient has hypothyroidism and last TSH was high.  Patient reports compliance with medication.  Patient last LDL of 137 was not at goal and is on atorvastatin and reports compliance with medication.    Review of Systems   Constitutional:  Positive for weight loss (3 lb weight loss). Negative for chills, diaphoresis, fever and malaise/fatigue.   HENT:  Negative for congestion, ear pain, sinus pain, sore throat and tinnitus.    Eyes:  Negative for blurred vision and photophobia.   Respiratory:  Negative for cough, hemoptysis, shortness of breath and wheezing.    Cardiovascular:  Negative for chest pain, palpitations, orthopnea, leg swelling and PND.   Gastrointestinal:   "Negative for abdominal pain, blood in stool, constipation, diarrhea, heartburn, melena, nausea and vomiting.   Genitourinary:  Negative for dysuria, frequency and urgency.   Musculoskeletal:  Negative for back pain, myalgias and neck pain.   Skin:  Negative for rash.   Neurological:  Negative for dizziness, tremors, seizures, loss of consciousness and weakness.   Endo/Heme/Allergies:  Negative for polydipsia.   Psychiatric/Behavioral:  Negative for depression and hallucinations. The patient has insomnia.      Objective:   /80 (BP Location: Left arm, Patient Position: Sitting, BP Method: Medium (Automatic))   Pulse 76   Temp 97.6 °F (36.4 °C) (Temporal)   Resp 16   Ht 5' 5" (1.651 m)   Wt 78.7 kg (173 lb 6.4 oz)   SpO2 96%   BMI 28.86 kg/m²     Physical Exam:  Patient not examined  Assessment:       ICD-10-CM ICD-9-CM   1. Type 2 diabetes mellitus with diabetic polyneuropathy, with long-term current use of insulin  E11.42 250.60    Z79.4 357.2     V58.67   2. Severe episode of recurrent major depressive disorder, without psychotic features  F33.2 296.33   3. Primary hypertension  I10 401.9   4. Acquired hypothyroidism  E03.9 244.9   5. Mixed hyperlipidemia  E78.2 272.2   6. Insomnia, unspecified type  G47.00 780.52       Plan:     Patient has diabetes with last A1c of 10.3 is not at goal..  Patient blood sugars seem to be running high.  Will increase Lantus from 38-42 units twice a day  Will increase Jardiance From 10 to 25 mg.  Patient blood pressures are under good control.  Will continue medication  Concern of using ARB and ACE-inhibitor together..  Patient reports she has discussed it with her nephrologist any wants to continue the to medication  Patient seem to have insomnia the depression seem under better control.  Will stop Abilify and use trazodone  Patient symptoms were better controlled with Zoloft but keeping in mind patient previous history of suicidal ideation Will avoid the " medicine  Patient last TSH was high will increase levothyroxine dose from 125 mcg to 150 mcg  Patient last LDL of 137 is not at goal.  Will increase atorvastatin to 80 mg  Better adherence with diet is recommended.  Patient is young aggressive blood sugar control is recommended.  Will follow-up closely       Medication List with Changes/Refills   New Medications    ATORVASTATIN (LIPITOR) 80 MG TABLET    Take 1 tablet (80 mg total) by mouth once daily.    EMPAGLIFLOZIN (JARDIANCE) 25 MG TABLET    Take 1 tablet (25 mg total) by mouth once daily.    LEVOTHYROXINE (SYNTHROID) 150 MCG TABLET    Take 1 tablet (150 mcg total) by mouth before breakfast.    TRAZODONE (DESYREL) 50 MG TABLET    Take 1 tablet (50 mg total) by mouth every evening.   Current Medications    ALBUTEROL SULFATE 90 MCG/ACTUATION AEBS    Inhale 2 puffs into the lungs daily as needed.    BENAZEPRIL (LOTENSIN) 40 MG TABLET    Take 40 mg by mouth once daily.    BUDESONIDE-FORMOTEROL 160-4.5 MCG (SYMBICORT) 160-4.5 MCG/ACTUATION HFAA    Inhale 2 puffs into the lungs 2 (two) times a day.    BUSPIRONE (BUSPAR) 10 MG TABLET    Take 10 mg by mouth every morning.    CHOLECALCIFEROL, VITAMIN D3, (VITAMIN D3) 25 MCG (1,000 UNIT) CAPSULE    Take 1 capsule by mouth once daily.    DICYCLOMINE HCL (DICYCLOMINE ORAL)    Take 1 tablet by mouth once daily.    DULAGLUTIDE (TRULICITY) 1.5 MG/0.5 ML PEN INJECTOR    Inject 1.5 mg into the skin every 7 days.    INSULIN ASPART U-100 (NOVOLOG) 100 UNIT/ML (3 ML) INPN PEN    Inject 12 Units into the skin 3 (three) times daily with meals.    INSULIN GLARGINE 100 UNITS/ML SUBQ PEN    Inject 30 Units into the skin 2 (two) times a day.    LOSARTAN (COZAAR) 100 MG TABLET    Take 1 tablet by mouth once daily.    MAGNESIUM OXIDE 500 MG CAP    Take 1 tablet by mouth once daily.    MECLIZINE HCL (MECLIZINE ORAL)    Take 1 tablet by mouth daily as needed.    MONTELUKAST (SINGULAIR) 10 MG TABLET    Take 1 tablet by mouth every evening.     OMEPRAZOLE (PRILOSEC) 20 MG CAPSULE    Take 20 mg by mouth once daily.    SUMATRIPTAN (IMITREX) 100 MG TABLET    Take 100 mg by mouth. Take one tablet as needed no more than 2 daily   Discontinued Medications    ARIPIPRAZOLE (ABILIFY) 5 MG TAB    Take 1 tablet (5 mg total) by mouth once daily.    ATORVASTATIN (LIPITOR) 40 MG TABLET    Take 1 tablet by mouth once daily.    EMPAGLIFLOZIN (JARDIANCE) 10 MG TABLET    Take 1 tablet (10 mg total) by mouth once daily.    LEVOTHYROXINE (SYNTHROID) 125 MCG TABLET    Take 1 tablet by mouth once daily.

## 2023-08-25 DIAGNOSIS — K21.9 GASTROESOPHAGEAL REFLUX DISEASE WITHOUT ESOPHAGITIS: Primary | ICD-10-CM

## 2023-08-25 RX ORDER — OMEPRAZOLE 20 MG/1
20 CAPSULE, DELAYED RELEASE ORAL DAILY
Qty: 90 CAPSULE | Refills: 3 | Status: SHIPPED | OUTPATIENT
Start: 2023-08-25 | End: 2024-02-27

## 2023-09-01 ENCOUNTER — TELEPHONE (OUTPATIENT)
Dept: PRIMARY CARE CLINIC | Facility: CLINIC | Age: 58
End: 2023-09-01
Payer: MEDICAID

## 2023-09-01 NOTE — TELEPHONE ENCOUNTER
Pt informed her ins has denied the request for Omeprazole due to meeting her limit of 180 day supply

## 2023-09-15 ENCOUNTER — TELEPHONE (OUTPATIENT)
Dept: PRIMARY CARE CLINIC | Facility: CLINIC | Age: 58
End: 2023-09-15
Payer: MEDICAID

## 2023-09-19 ENCOUNTER — TELEPHONE (OUTPATIENT)
Dept: PULMONOLOGY | Facility: CLINIC | Age: 58
End: 2023-09-19
Payer: MEDICAID

## 2023-09-19 DIAGNOSIS — R06.02 SOB (SHORTNESS OF BREATH): Primary | ICD-10-CM

## 2023-09-19 NOTE — TELEPHONE ENCOUNTER
Return call and spoke with patient. LB  ----- Message from Marianna Garza sent at 9/19/2023 10:08 AM CDT -----  Contact: pt  Pt calling for Pennie to check status of hospital follow up appt and she can be reached at [phone].  Pt released from hospital on 9/13/2023.  Pt stated she few issues.    Thanks,

## 2023-09-25 DIAGNOSIS — E11.42 TYPE 2 DIABETES MELLITUS WITH DIABETIC POLYNEUROPATHY, WITH LONG-TERM CURRENT USE OF INSULIN: ICD-10-CM

## 2023-09-25 DIAGNOSIS — Z79.4 TYPE 2 DIABETES MELLITUS WITH DIABETIC POLYNEUROPATHY, WITH LONG-TERM CURRENT USE OF INSULIN: ICD-10-CM

## 2023-09-25 RX ORDER — INSULIN GLARGINE 100 [IU]/ML
42 INJECTION, SOLUTION SUBCUTANEOUS 2 TIMES DAILY
Qty: 27 ML | Refills: 11 | Status: SHIPPED | OUTPATIENT
Start: 2023-09-25 | End: 2024-02-27

## 2023-09-25 NOTE — TELEPHONE ENCOUNTER
Patient is requesting a refill for her insulin. Pt states she has been out for a week. Please advise.

## 2023-09-25 NOTE — TELEPHONE ENCOUNTER
----- Message from Pennie Mares sent at 9/25/2023  9:22 AM CDT -----  Patient is calling in regards to currently still waiting for insulin refill to be call into pharmacy its been a week.  Please call her back [phone].            Thanks  grant

## 2023-09-26 ENCOUNTER — OFFICE VISIT (OUTPATIENT)
Dept: PRIMARY CARE CLINIC | Facility: CLINIC | Age: 58
End: 2023-09-26
Payer: MEDICAID

## 2023-09-26 VITALS
HEIGHT: 65 IN | DIASTOLIC BLOOD PRESSURE: 72 MMHG | TEMPERATURE: 98 F | BODY MASS INDEX: 28.29 KG/M2 | OXYGEN SATURATION: 99 % | HEART RATE: 103 BPM | SYSTOLIC BLOOD PRESSURE: 105 MMHG | RESPIRATION RATE: 16 BRPM | WEIGHT: 169.81 LBS

## 2023-09-26 DIAGNOSIS — E11.42 TYPE 2 DIABETES MELLITUS WITH DIABETIC POLYNEUROPATHY, WITH LONG-TERM CURRENT USE OF INSULIN: Primary | ICD-10-CM

## 2023-09-26 DIAGNOSIS — R05.2 SUBACUTE COUGH: ICD-10-CM

## 2023-09-26 DIAGNOSIS — I10 PRIMARY HYPERTENSION: ICD-10-CM

## 2023-09-26 DIAGNOSIS — E78.2 MIXED HYPERLIPIDEMIA: ICD-10-CM

## 2023-09-26 DIAGNOSIS — Z79.4 TYPE 2 DIABETES MELLITUS WITH DIABETIC POLYNEUROPATHY, WITH LONG-TERM CURRENT USE OF INSULIN: Primary | ICD-10-CM

## 2023-09-26 DIAGNOSIS — N05.1 FOCAL SEGMENTAL GLOMERULOSCLEROSIS: ICD-10-CM

## 2023-09-26 DIAGNOSIS — E03.9 ACQUIRED HYPOTHYROIDISM: ICD-10-CM

## 2023-09-26 PROCEDURE — 95251 CONT GLUC MNTR ANALYSIS I&R: CPT | Mod: S$GLB,,, | Performed by: INTERNAL MEDICINE

## 2023-09-26 PROCEDURE — 4010F PR ACE/ARB THEARPY RXD/TAKEN: ICD-10-PCS | Mod: CPTII,S$GLB,, | Performed by: INTERNAL MEDICINE

## 2023-09-26 PROCEDURE — 99214 OFFICE O/P EST MOD 30 MIN: CPT | Mod: 25,S$GLB,, | Performed by: INTERNAL MEDICINE

## 2023-09-26 PROCEDURE — 3008F PR BODY MASS INDEX (BMI) DOCUMENTED: ICD-10-PCS | Mod: CPTII,S$GLB,, | Performed by: INTERNAL MEDICINE

## 2023-09-26 PROCEDURE — 3078F DIAST BP <80 MM HG: CPT | Mod: CPTII,S$GLB,, | Performed by: INTERNAL MEDICINE

## 2023-09-26 PROCEDURE — 3074F SYST BP LT 130 MM HG: CPT | Mod: CPTII,S$GLB,, | Performed by: INTERNAL MEDICINE

## 2023-09-26 PROCEDURE — 3062F PR POS MACROALBUMINURIA RESULT DOCUMENTED/REVIEW: ICD-10-PCS | Mod: CPTII,S$GLB,, | Performed by: INTERNAL MEDICINE

## 2023-09-26 PROCEDURE — 4010F ACE/ARB THERAPY RXD/TAKEN: CPT | Mod: CPTII,S$GLB,, | Performed by: INTERNAL MEDICINE

## 2023-09-26 PROCEDURE — 3062F POS MACROALBUMINURIA REV: CPT | Mod: CPTII,S$GLB,, | Performed by: INTERNAL MEDICINE

## 2023-09-26 PROCEDURE — 3078F PR MOST RECENT DIASTOLIC BLOOD PRESSURE < 80 MM HG: ICD-10-PCS | Mod: CPTII,S$GLB,, | Performed by: INTERNAL MEDICINE

## 2023-09-26 PROCEDURE — 1159F PR MEDICATION LIST DOCUMENTED IN MEDICAL RECORD: ICD-10-PCS | Mod: CPTII,S$GLB,, | Performed by: INTERNAL MEDICINE

## 2023-09-26 PROCEDURE — 3066F PR DOCUMENTATION OF TREATMENT FOR NEPHROPATHY: ICD-10-PCS | Mod: CPTII,S$GLB,, | Performed by: INTERNAL MEDICINE

## 2023-09-26 PROCEDURE — 1160F PR REVIEW ALL MEDS BY PRESCRIBER/CLIN PHARMACIST DOCUMENTED: ICD-10-PCS | Mod: CPTII,S$GLB,, | Performed by: INTERNAL MEDICINE

## 2023-09-26 PROCEDURE — 3008F BODY MASS INDEX DOCD: CPT | Mod: CPTII,S$GLB,, | Performed by: INTERNAL MEDICINE

## 2023-09-26 PROCEDURE — 99214 PR OFFICE/OUTPT VISIT, EST, LEVL IV, 30-39 MIN: ICD-10-PCS | Mod: 25,S$GLB,, | Performed by: INTERNAL MEDICINE

## 2023-09-26 PROCEDURE — 3074F PR MOST RECENT SYSTOLIC BLOOD PRESSURE < 130 MM HG: ICD-10-PCS | Mod: CPTII,S$GLB,, | Performed by: INTERNAL MEDICINE

## 2023-09-26 PROCEDURE — 3066F NEPHROPATHY DOC TX: CPT | Mod: CPTII,S$GLB,, | Performed by: INTERNAL MEDICINE

## 2023-09-26 PROCEDURE — 2023F PR DILATED RETINAL EXAM W/O EVID OF RETINOPATHY: ICD-10-PCS | Mod: CPTII,S$GLB,, | Performed by: INTERNAL MEDICINE

## 2023-09-26 PROCEDURE — 95251 PR GLUCOSE MONITOR, 72 HOUR, PHYS INTERP: ICD-10-PCS | Mod: S$GLB,,, | Performed by: INTERNAL MEDICINE

## 2023-09-26 PROCEDURE — 2023F DILAT RTA XM W/O RTNOPTHY: CPT | Mod: CPTII,S$GLB,, | Performed by: INTERNAL MEDICINE

## 2023-09-26 PROCEDURE — 3046F PR MOST RECENT HEMOGLOBIN A1C LEVEL > 9.0%: ICD-10-PCS | Mod: CPTII,S$GLB,, | Performed by: INTERNAL MEDICINE

## 2023-09-26 PROCEDURE — 3046F HEMOGLOBIN A1C LEVEL >9.0%: CPT | Mod: CPTII,S$GLB,, | Performed by: INTERNAL MEDICINE

## 2023-09-26 PROCEDURE — 1159F MED LIST DOCD IN RCRD: CPT | Mod: CPTII,S$GLB,, | Performed by: INTERNAL MEDICINE

## 2023-09-26 PROCEDURE — 1160F RVW MEDS BY RX/DR IN RCRD: CPT | Mod: CPTII,S$GLB,, | Performed by: INTERNAL MEDICINE

## 2023-09-26 RX ORDER — INSULIN ASPART 100 [IU]/ML
12 INJECTION, SOLUTION INTRAVENOUS; SUBCUTANEOUS
Qty: 12 ML | Refills: 11 | Status: SHIPPED | OUTPATIENT
Start: 2023-09-26 | End: 2024-02-05

## 2023-09-26 RX ORDER — BENZONATATE 100 MG/1
100 CAPSULE ORAL 3 TIMES DAILY PRN
Qty: 30 CAPSULE | Refills: 0 | Status: SHIPPED | OUTPATIENT
Start: 2023-09-26 | End: 2023-10-31

## 2023-09-26 RX ORDER — AZELASTINE 1 MG/ML
1 SPRAY, METERED NASAL 2 TIMES DAILY
COMMUNITY
End: 2023-10-25

## 2023-09-26 RX ORDER — LOSARTAN POTASSIUM 50 MG/1
50 TABLET ORAL DAILY
Qty: 90 TABLET | Refills: 3 | Status: SHIPPED | OUTPATIENT
Start: 2023-09-26 | End: 2023-11-07

## 2023-09-26 NOTE — PROGRESS NOTES
Subjective:      Patient ID: Nati Buenrostro is a 57 y.o. female.    Chief Complaint: Diabetes (F/u ) and Follow-up (Hospital f/u cough, needs order for mammogram)    :  Please with last A1c of 10.3 suggest very poorly controlled diabetes.  Patient has CGM placed and the data is downloaded.  Patient data suggest that patient average blood sugar is running 217 with standard deviation of 75.  Patient blood sugars are in range 34% of the time running high 2 38% of the time and very high 28% of the time.  Patient is taking Lantus 42 units twice a day +NovoLog 12 units before each meal.  Blood sugars seem to be running high after meals.  Patient is not very adherent to diet but is planning to start being more adherent.  Are on lower side and patient also reports some dizziness, fatigue and lightheadedness.     Patient has multiple lung nodules for which she is being followed by MD Mora.  Patient was recently admitted to hospital secondary to fever of unknown origin.  Patient had extensive workup including UA, CBC, LP, CT chest and all of them were negative except CT chest showed some infiltrate bilateral mild pleural effusion and pleural consolidation.  Patient was treated with Rocephin and azithromycin.  Patient fever improved.  Patient reports having some cough, that is , no wheezing or shortness a breath    Patient has GERD for long and is taking omeprazole with some help.  Patient has use the medication for more than 6 months but the symptoms are still there.    Review of Systems   Constitutional:  Positive for weight loss (4 lbs weight loss). Negative for chills, diaphoresis, fever and malaise/fatigue.   HENT:  Negative for congestion, ear pain, sinus pain, sore throat and tinnitus.    Eyes:  Negative for blurred vision and photophobia.   Respiratory:  Negative for cough, hemoptysis, shortness of breath and wheezing.    Cardiovascular:  Negative for chest pain, palpitations, orthopnea, leg swelling and PND.  "  Gastrointestinal:  Negative for abdominal pain, blood in stool, constipation, diarrhea, heartburn, melena, nausea and vomiting.   Genitourinary:  Negative for dysuria, frequency and urgency.   Musculoskeletal:  Negative for back pain, myalgias and neck pain.   Skin:  Negative for rash.   Neurological:  Negative for dizziness, tremors, seizures, loss of consciousness and weakness.   Endo/Heme/Allergies:  Negative for polydipsia.   Psychiatric/Behavioral:  Negative for depression and hallucinations. The patient does not have insomnia.      Objective:   /72 (BP Location: Right arm, Patient Position: Sitting, BP Method: Medium (Automatic))   Pulse 103   Temp 98.2 °F (36.8 °C) (Temporal)   Resp 16   Ht 5' 5" (1.651 m)   Wt 77 kg (169 lb 12.8 oz)   SpO2 99%   BMI 28.26 kg/m²     Physical Exam  Constitutional:       General: She is not in acute distress.     Appearance: She is not diaphoretic.   Neck:      Thyroid: No thyromegaly.   Cardiovascular:      Rate and Rhythm: Normal rate and regular rhythm.      Heart sounds: Normal heart sounds. No murmur heard.  Pulmonary:      Effort: Pulmonary effort is normal. No respiratory distress.      Breath sounds: Normal breath sounds. No wheezing.   Abdominal:      General: Bowel sounds are normal. There is no distension.      Palpations: Abdomen is soft.      Tenderness: There is no abdominal tenderness.   Musculoskeletal:      Comments: Upper back pain and diffuse muscle pains   Lymphadenopathy:      Cervical: No cervical adenopathy.   Neurological:      Mental Status: She is alert and oriented to person, place, and time.   Psychiatric:         Behavior: Behavior normal.         Thought Content: Thought content normal.         Judgment: Judgment normal.       Assessment:       ICD-10-CM ICD-9-CM   1. Type 2 diabetes mellitus with diabetic polyneuropathy, with long-term current use of insulin  E11.42 250.60    Z79.4 357.2     V58.67   2. Primary hypertension  I10 " 401.9   3. Acquired hypothyroidism  E03.9 244.9       Plan:     Patient with diabetes with last A1c of 10.3 suggest very poorly controlled diabetes  Patient CGM data suggest that blood sugar after meals are running high  Will increase pre meal insulin from 12 units to 15 units  Will continue Lantus 42 units twice a day  Patient has hypertension blood pressures are on lower side and patient is fatigued tired and exhausted  Will decrease losartan to 50 mg  Patient nephrologist wants to continue losartan and ACE inhibitor for FSGS  If these symptoms persist advised patient to hold on Jardiance.  Patient has hypothyroidism and last TSH was high.  Will repeat labs on return  Patient has persistent GERD which is not improved with PPI  Patient call her insurance and find a GI who takes her insurance and will refer for possible EGD  Patient still has some cough will use Tessalon Perles  Patient has appointment with pulmonologist  Has multiple lung nodule for which she is being followed by MD Mora    Medication List with Changes/Refills   Current Medications    ALBUTEROL SULFATE 90 MCG/ACTUATION AEBS    Inhale 2 puffs into the lungs daily as needed.    ATORVASTATIN (LIPITOR) 80 MG TABLET    Take 1 tablet (80 mg total) by mouth once daily.    AZELASTINE (ASTELIN) 137 MCG (0.1 %) NASAL SPRAY    1 spray by Nasal route 2 (two) times a day.    BENAZEPRIL (LOTENSIN) 40 MG TABLET    Take 40 mg by mouth once daily.    BUDESONIDE-FORMOTEROL 160-4.5 MCG (SYMBICORT) 160-4.5 MCG/ACTUATION HFAA    Inhale 2 puffs into the lungs 2 (two) times a day.    BUSPIRONE (BUSPAR) 10 MG TABLET    Take 10 mg by mouth every morning.    CHOLECALCIFEROL, VITAMIN D3, (VITAMIN D3) 25 MCG (1,000 UNIT) CAPSULE    Take 1 capsule by mouth once daily.    DICYCLOMINE HCL (DICYCLOMINE ORAL)    Take 1 tablet by mouth once daily.    DULAGLUTIDE (TRULICITY) 1.5 MG/0.5 ML PEN INJECTOR    Inject 1.5 mg into the skin every 7 days.    EMPAGLIFLOZIN (JARDIANCE) 25  MG TABLET    Take 1 tablet (25 mg total) by mouth once daily.    INSULIN ASPART U-100 (NOVOLOG) 100 UNIT/ML (3 ML) INPN PEN    Inject 12 Units into the skin 3 (three) times daily with meals.    INSULIN GLARGINE 100 UNITS/ML SUBQ PEN    Inject 42 Units into the skin 2 (two) times a day.    LEVOTHYROXINE (SYNTHROID) 150 MCG TABLET    Take 1 tablet (150 mcg total) by mouth before breakfast.    MAGNESIUM OXIDE 500 MG CAP    Take 1 tablet by mouth once daily.    MECLIZINE HCL (MECLIZINE ORAL)    Take 1 tablet by mouth daily as needed.    MONTELUKAST (SINGULAIR) 10 MG TABLET    Take 1 tablet by mouth every evening.    OMEPRAZOLE (PRILOSEC) 20 MG CAPSULE    Take 1 capsule (20 mg total) by mouth once daily.    SUMATRIPTAN (IMITREX) 100 MG TABLET    Take 100 mg by mouth. Take one tablet as needed no more than 2 daily    TRAZODONE (DESYREL) 50 MG TABLET    Take 1 tablet (50 mg total) by mouth every evening.   Discontinued Medications    LOSARTAN (COZAAR) 100 MG TABLET    Take 1 tablet by mouth once daily.

## 2023-09-26 NOTE — TELEPHONE ENCOUNTER
----- Message from Guido Arriola MA sent at 9/25/2023 10:27 AM CDT -----  Contact: svitlana    ----- Message -----  From: Mario Stevens  Sent: 9/25/2023  10:26 AM CDT  To: [facility] Staff    Patient is returning a call to Juani. Please call her back at [phone].        Thanks  DD

## 2023-09-27 ENCOUNTER — TELEPHONE (OUTPATIENT)
Dept: PRIMARY CARE CLINIC | Facility: CLINIC | Age: 58
End: 2023-09-27
Payer: MEDICAID

## 2023-09-27 NOTE — TELEPHONE ENCOUNTER
Advised patient to keep her nasal mucosa moist with over the counter nasal spray. Pt instructed to get her labs drawn. Pt gave a verbalized understanding.

## 2023-09-27 NOTE — TELEPHONE ENCOUNTER
"Pt wants to advise , she has been having nose bleeds. Pt states she "forgot to mention it in her appointment yesterday".          "

## 2023-09-27 NOTE — TELEPHONE ENCOUNTER
Please keep the nasal mucosa moist with normal saline nasal spray over-the-counter  Labs are ordered please get those labs done, will see if platelets are normal.  If the nasal bleed still persist please make an appointment and I will evaluate

## 2023-09-27 NOTE — TELEPHONE ENCOUNTER
----- Message from Tomeka Ramos sent at 9/27/2023  8:15 AM CDT -----  Contact: self  She forgot to mention that she's having nosebleeds. If you feel it's necessary, you can call her back at [phone]

## 2023-09-29 ENCOUNTER — CLINICAL SUPPORT (OUTPATIENT)
Dept: OBSTETRICS AND GYNECOLOGY | Facility: CLINIC | Age: 58
End: 2023-09-29
Payer: MEDICAID

## 2023-09-29 DIAGNOSIS — Z01.89 ROUTINE LAB DRAW: Primary | ICD-10-CM

## 2023-09-29 LAB
ABS NRBC COUNT: 0 THOU/UL (ref 0–0.01)
ABSOLUTE BASOPHIL: 0 10*3/UL (ref 0–0.3)
ABSOLUTE EOSINOPHIL: 0.2 10*3/UL (ref 0–0.6)
ABSOLUTE IMMATURE GRAN: 0.03 THOU/UL (ref 0–0.03)
ABSOLUTE LYMPHOCYTE: 1.9 10*3/UL (ref 1.2–4)
ABSOLUTE MONOCYTE: 0.5 10*3/UL (ref 0.1–0.8)
ALBUMIN SERPL BCP-MCNC: 3.3 G/DL (ref 3.4–5)
ALP SERPL-CCNC: 74 U/L (ref 45–117)
ALT SERPL W P-5'-P-CCNC: 34 U/L (ref 13–56)
ANION GAP SERPL CALC-SCNC: 7 MMOL/L (ref 3–11)
APPEARANCE, UA: CLEAR
APPEARANCE, UA: CLEAR
AST SERPL-CCNC: 13 U/L (ref 15–37)
BACTERIA SPEC CULT: ABNORMAL /HPF
BASOPHILS NFR BLD: 0.5 % (ref 0–3)
BILIRUB SERPL-MCNC: 0.3 MG/DL (ref 0.2–1)
BILIRUB UR QL STRIP: NEGATIVE
BILIRUB UR QL STRIP: NEGATIVE
BUN SERPL-MCNC: 15 MG/DL (ref 7–18)
BUN/CREAT SERPL: 15.95 RATIO
CALCIUM SERPL-MCNC: 9 MG/DL (ref 8.5–10.1)
CHLORIDE SERPL-SCNC: 106 MMOL/L (ref 98–107)
CHOLEST SERPL-MSCNC: 216 MG/DL
CO2 SERPL-SCNC: 28 MMOL/L (ref 21–32)
COLOR UR: COLORLESS
COLOR UR: COLORLESS
CREAT SERPL-MCNC: 0.94 MG/DL (ref 0.55–1.02)
CREATININE, URINE: 62.8 MG/DL (ref 10–175)
EOSINOPHIL NFR BLD: 3.3 % (ref 0–6)
ERYTHROCYTE [DISTWIDTH] IN BLOOD BY AUTOMATED COUNT: 15.4 % (ref 0–15.5)
ESTIMATED AVG GLUCOSE: 193 MG/DL
GFR ESTIMATION: > 60
GLUCOSE (UA): >1000 MG/DL
GLUCOSE (UA): >1000 MG/DL
GLUCOSE SERPL-MCNC: 196 MG/DL (ref 74–106)
HBA1C MFR BLD: 7.6 % (ref 4.2–6.3)
HCT VFR BLD AUTO: 37.6 % (ref 37–47)
HDLC SERPL-MCNC: 60 MG/DL
HGB BLD-MCNC: 11.4 G/DL (ref 12–16)
HGB UR QL STRIP: NEGATIVE
HGB UR QL STRIP: NEGATIVE
IMMATURE GRANULOCYTES: 0.5 % (ref 0–0.43)
KETONES UR QL STRIP: NEGATIVE MG/DL
KETONES UR QL STRIP: NEGATIVE MG/DL
LDLC SERPL CALC-MCNC: 118.2 MG/DL
LEUKOCYTE ESTERASE UR QL STRIP: NEGATIVE LEU/UL
LEUKOCYTE ESTERASE UR QL STRIP: NEGATIVE LEU/UL
LYMPHOCYTES NFR BLD: 35 % (ref 20–45)
MCH RBC QN AUTO: 25.3 PG (ref 27–32)
MCHC RBC AUTO-ENTMCNC: 30.3 % (ref 32–36)
MCV RBC AUTO: 83.6 FL (ref 80–99)
MICROALBUMIN URINE: 413.5 MG/L (ref 0–20)
MICROALBUMIN/CREATININE RATIO: 658 MG/G (ref 0–30)
MONOCYTES NFR BLD: 9.3 % (ref 2–10)
NEUTROPHILS # BLD AUTO: 2.8 10*3/UL (ref 1.4–7)
NEUTROPHILS NFR BLD: 51.4 % (ref 50–80)
NITRITE UR QL STRIP: NEGATIVE
NITRITE UR QL STRIP: NEGATIVE
NUCLEATED RED BLOOD CELLS: 0 % (ref 0–0.2)
PH UR STRIP: 6.5 PH (ref 5–8)
PH UR STRIP: 6.5 PH (ref 5–8)
PLATELETS: 267 10*3/UL (ref 130–400)
PMV BLD AUTO: 10 FL (ref 9.2–12.2)
POTASSIUM SERPL-SCNC: 4.7 MMOL/L (ref 3.5–5.1)
PROT SERPL-MCNC: 7.1 G/DL (ref 6.4–8.2)
PROT UR QL STRIP: 50 MG/DL
PROT UR QL STRIP: 50 MG/DL
RBC # BLD AUTO: 4.5 10*6/UL (ref 4.2–5.4)
RBC #/AREA URNS HPF: ABNORMAL /HPF (ref 0–2)
SERVICE COMMENT 03: ABNORMAL
SODIUM BLD-SCNC: 141 MMOL/L (ref 131–143)
SP GR UR STRIP: 1.02 (ref 1–1.03)
SP GR UR STRIP: 1.02 (ref 1–1.03)
SQUAMOUS EPITHELIAL, UA: ABNORMAL /LPF
TRIGL SERPL-MCNC: 189 MG/DL (ref 0–149)
TSH SERPL DL<=0.005 MIU/L-ACNC: 0.64 UIU/ML (ref 0.36–3.74)
UROBILINOGEN UR STRIP-ACNC: NORMAL MG/DL
UROBILINOGEN UR STRIP-ACNC: NORMAL MG/DL
VLDL CHOLESTEROL: 38 MG/DL
WBC # BLD: 5.5 10*3/UL (ref 4.5–10)
WBC #/AREA URNS HPF: ABNORMAL /HPF (ref 0–2)
YEAST UA, AUTO: ABNORMAL /HPF

## 2023-09-29 PROCEDURE — 36415 PR COLLECTION VENOUS BLOOD,VENIPUNCTURE: ICD-10-PCS | Mod: S$GLB,,, | Performed by: INTERNAL MEDICINE

## 2023-09-29 PROCEDURE — 36415 COLL VENOUS BLD VENIPUNCTURE: CPT | Mod: S$GLB,,, | Performed by: INTERNAL MEDICINE

## 2023-10-01 LAB — URINE CULTURE, ROUTINE: NORMAL

## 2023-10-03 ENCOUNTER — PATIENT MESSAGE (OUTPATIENT)
Dept: PRIMARY CARE CLINIC | Facility: CLINIC | Age: 58
End: 2023-10-03
Payer: MEDICAID

## 2023-10-03 ENCOUNTER — TELEPHONE (OUTPATIENT)
Dept: PRIMARY CARE CLINIC | Facility: CLINIC | Age: 58
End: 2023-10-03
Payer: MEDICAID

## 2023-10-03 DIAGNOSIS — K21.9 GASTROESOPHAGEAL REFLUX DISEASE, UNSPECIFIED WHETHER ESOPHAGITIS PRESENT: Primary | ICD-10-CM

## 2023-10-03 DIAGNOSIS — D50.9 HYPOCHROMIC ANEMIA: Primary | ICD-10-CM

## 2023-10-03 NOTE — TELEPHONE ENCOUNTER
Spoke to patient, patient will contact her insurance to see which GI providers are in her network. Pt will return call to office.

## 2023-10-04 LAB — BCS RECOMMENDATION EXT: NORMAL

## 2023-10-05 ENCOUNTER — PATIENT OUTREACH (OUTPATIENT)
Dept: ADMINISTRATIVE | Facility: HOSPITAL | Age: 58
End: 2023-10-05
Payer: MEDICAID

## 2023-10-23 PROBLEM — A75.9: Status: ACTIVE | Noted: 2023-10-23

## 2023-10-23 NOTE — PROGRESS NOTES
Infectious Diseases Clinic Note    Subjective:       Patient ID: Nati Buenrostro is a 58 y.o. female     Chief Complaint: Referral        57-year-old female with past medical history of HTN, HLD, DM 2, hypothyroidism, FSGS and GERD presented to ED with complaints of fevers at home in September.  She was having nausea and headache.   She initially thought her symptoms were due to chronic migraines, however her symptoms progressively worsened leading her to present to ED for further evaluation.  Labs with leukopenia, hyponatremia and mildly elevated procalcitonin.  D-dimer also elevated.  UA positive for WBCs and bacteria.  CT of the chest was negative for pulmonary embolism, however did demonstrate a right-sided perihilar infiltrate.  CT of the abdomen pelvis demonstrated bilateral renal cysts, however no stone or obstructive process was appreciated.  She was given IV fluids and empiric antibiotics per sepsis protocol.  Subsequently admitted for sepsis due to UTI. Patient admitted to [facility] with fever of unknown origin.  Blood cultures negative to date    Patient was complaining of headache, blurred vision, sensitive to light and sound.  CT head was ordered and came back negative for acute.  LP was done on 9/5 and CSF did not show evidence of infection.  Echo ordered on 9/6, no evidence of vegetation.  Uric acid and CPK within normal limit.  HIV negative. ESR elevated.  Rheumatoid factor negative.  JORY negative.  Repeat COVID swab was negative. Peripheral smear unremarkable.  Dr. Mccracken was consulted and recommended doxycycline twice daily.    Right perihilar infiltrate, mildly dense on CT noted on admission has since resolved. Cultures negative. Pulmonary recommended conservative management as well. Doxycycline was apparently held for bronchoscopy and never restarted at discharge. 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 She saw Pulmonology post discharge, who  recommended she come here for treatment.  She is currently afebrile. Denies back pain, rash, HA, neck pain/stiffness, NVD, cough, joint pain. No acute issues reported.                Past Medical History:   Diagnosis Date    Acquired hypothyroidism 5/3/2023    Asthma     Depression     Diabetes mellitus, type 2     Disorder of kidney and ureter     Focal segmental glomerulosclerosis 5/3/2023    GERD (gastroesophageal reflux disease)     Hyperlipidemia     Hypertension     Migraines     Thyroid disease     Type 2 diabetes mellitus with diabetic polyneuropathy, with long-term current use of insulin 5/3/2023       Social History     Socioeconomic History    Marital status: Single    Number of children: 2   Tobacco Use    Smoking status: Never    Smokeless tobacco: Never   Substance and Sexual Activity    Drug use: Never   Social History Narrative    ** Merged History Encounter **              Current Outpatient Medications:     albuterol sulfate 90 mcg/actuation aebs, Inhale 2 puffs into the lungs daily as needed., Disp: , Rfl:     atorvastatin (LIPITOR) 80 MG tablet, Take 1 tablet (80 mg total) by mouth once daily., Disp: 90 tablet, Rfl: 3    benazepriL (LOTENSIN) 40 MG tablet, Take 40 mg by mouth once daily., Disp: , Rfl:     budesonide-formoterol 160-4.5 mcg (SYMBICORT) 160-4.5 mcg/actuation HFAA, Inhale 2 puffs into the lungs 2 (two) times a day., Disp: , Rfl:     busPIRone (BUSPAR) 10 MG tablet, Take 10 mg by mouth every morning., Disp: , Rfl:     empagliflozin (JARDIANCE) 25 mg tablet, Take 1 tablet (25 mg total) by mouth once daily., Disp: 30 tablet, Rfl: 3    insulin aspart U-100 (NOVOLOG) 100 unit/mL (3 mL) InPn pen, Inject 12 Units into the skin 3 (three) times daily with meals., Disp: 12 mL, Rfl: 11    insulin glargine 100 units/mL SubQ pen, Inject 42 Units into the skin 2 (two) times a day., Disp: 27 mL, Rfl: 11    levothyroxine (SYNTHROID) 150 MCG tablet, Take 1 tablet (150 mcg total) by mouth before  breakfast., Disp: 30 tablet, Rfl: 11    losartan (COZAAR) 50 MG tablet, Take 1 tablet (50 mg total) by mouth once daily., Disp: 90 tablet, Rfl: 3    magnesium oxide 500 mg Cap, Take 1 tablet by mouth once daily., Disp: , Rfl:     montelukast (SINGULAIR) 10 mg tablet, Take 1 tablet by mouth every evening., Disp: , Rfl:     omeprazole (PRILOSEC) 20 MG capsule, Take 1 capsule (20 mg total) by mouth once daily., Disp: 90 capsule, Rfl: 3    sumatriptan (IMITREX) 100 MG tablet, Take 100 mg by mouth. Take one tablet as needed no more than 2 daily, Disp: , Rfl:     traZODone (DESYREL) 50 MG tablet, Take 1 tablet (50 mg total) by mouth every evening., Disp: 30 tablet, Rfl: 0    azelastine (ASTELIN) 137 mcg (0.1 %) nasal spray, 1 spray by Nasal route 2 (two) times a day., Disp: , Rfl:     cholecalciferol, vitamin D3, (VITAMIN D3) 25 mcg (1,000 unit) capsule, Take 1 capsule by mouth once daily., Disp: , Rfl:     dicyclomine HCl (DICYCLOMINE ORAL), Take 1 tablet by mouth once daily., Disp: , Rfl:     doxycycline (VIBRA-TABS) 100 MG tablet, Take 1 tablet (100 mg total) by mouth 2 (two) times daily. for 10 days, Disp: 20 tablet, Rfl: 0    dulaglutide (TRULICITY) 1.5 mg/0.5 mL pen injector, Inject 1.5 mg into the skin every 7 days., Disp: , Rfl:     meclizine HCl (MECLIZINE ORAL), Take 1 tablet by mouth daily as needed., Disp: , Rfl:     Review of Systems   Constitutional:  Negative for chills and fever.   HENT:  Negative for sore throat.    Eyes:  Negative for photophobia and visual disturbance.   Respiratory:  Negative for apnea, cough, choking, shortness of breath and wheezing.    Cardiovascular:  Negative for chest pain, palpitations and leg swelling.   Gastrointestinal:  Negative for abdominal pain, diarrhea, nausea and vomiting.   Genitourinary:  Negative for difficulty urinating, dysuria, flank pain, frequency, hematuria and urgency.   Musculoskeletal:  Negative for joint swelling and neck stiffness.   Skin:  Negative for  rash.   Neurological:  Negative for dizziness, weakness, light-headedness and headaches.   Psychiatric/Behavioral:  Negative for confusion, hallucinations, self-injury and suicidal ideas.    All other systems reviewed and are negative.          Objective:      Vitals:    10/25/23 1502   BP: 110/68   Pulse: 96     Physical Exam  Vitals and nursing note reviewed.   Constitutional:       General: She is not in acute distress.     Appearance: Normal appearance. She is not ill-appearing.   HENT:      Head: Normocephalic and atraumatic.   Eyes:      General: No scleral icterus.     Pupils: Pupils are equal, round, and reactive to light.   Cardiovascular:      Rate and Rhythm: Normal rate.   Pulmonary:      Effort: Pulmonary effort is normal.   Abdominal:      General: Abdomen is flat. Bowel sounds are normal.   Musculoskeletal:         General: Normal range of motion.      Cervical back: Normal range of motion and neck supple. No rigidity or tenderness.   Lymphadenopathy:      Cervical: No cervical adenopathy.   Skin:     General: Skin is warm.      Coloration: Skin is not jaundiced.      Findings: No rash.   Neurological:      General: No focal deficit present.      Mental Status: She is alert and oriented to person, place, and time. Mental status is at baseline.   Psychiatric:         Attention and Perception: Attention and perception normal.         Mood and Affect: Mood and affect normal.         Speech: Speech normal.         Behavior: Behavior normal. Behavior is cooperative.         Thought Content: Thought content normal.         Cognition and Memory: Cognition and memory normal.         Judgment: Judgment normal.             Assessment/Plan:       1. Typhus fever      Problem List Items Addressed This Visit          ID    Typhus fever - Primary    Current Assessment & Plan         According to hospital records, the doxycycline was held.  Unable to determine if she received the full course of the regimen needed  (10 days per IDSA).       PLAN    1. Doxycycline 100 mg bid x 10 days per guidelines. No further testing needed.   2. F/u as needed.          Relevant Medications    doxycycline (VIBRA-TABS) 100 MG tablet      Patient Outreach on 10/05/2023   Component Date Value Ref Range Status    BCS Recommendation External 10/04/2023 Repeat mammogram in 1 year   Final   Orders Only on 09/29/2023   Component Date Value Ref Range Status    Urine Culture, Routine 09/29/2023 No growth in 18-24 hoursNo growth in 36-48 hours   Final   Orders Only on 09/29/2023   Component Date Value Ref Range Status    Sodium 09/29/2023 141  131 - 143 mmol/L Final    Potassium 09/29/2023 4.7  3.5 - 5.1 mmol/L Final    Chloride 09/29/2023 106  98 - 107 mmol/L Final    CO2 09/29/2023 28  21 - 32 mmol/L Final    Anion Gap 09/29/2023 7.0  3.0 - 11.0 mmol/L Final    BUN 09/29/2023 15.0  7.0 - 18.0 mg/dL Final    Creatinine 09/29/2023 0.94  0.55 - 1.02 mg/dL Final    GFR ESTIMATION 09/29/2023 > 60  >60 Final    BUN/Creatinine Ratio 09/29/2023 15.95  Ratio Final    Glucose 09/29/2023 196 (H)  74 - 106 mg/dL Final    Calcium 09/29/2023 9.0  8.5 - 10.1 mg/dL Final    Total Bilirubin 09/29/2023 0.3  0.2 - 1.0 mg/dL Final    AST 09/29/2023 13 (L)  15 - 37 U/L Final    ALT 09/29/2023 34  13 - 56 U/L Final    Total Protein 09/29/2023 7.1  6.4 - 8.2 g/dL Final    Albumin 09/29/2023 3.3 (L)  3.4 - 5.0 g/dL Final    Alkaline Phosphatase 09/29/2023 74  45 - 117 U/L Final    Cholesterol 09/29/2023 216 (H)  <200 mg/dL Final    Comment: The National Cholesterol Education Program has publishedreference Cholesterol values for cardiovascular risk to be:            Low risk................<200 mg/dL          Borderline risk.........201 - 239 mg/dL        High risk...............240 mg/dl   and greater      Triglycerides 09/29/2023 189 (H)  0 - 149 mg/dL Final    Ranges:Normal...............<150 mg/dLBorderline Risk......150-199 mg/dLHigh.................200-499 mg/dLVery  High............>500 mg/dL    HDL 09/29/2023 60  >39 mg/dL Final    Comment: The National Cholesterol Education Program Adult TreatmentPanel III (NCEP-ATP III) provides the followingclassifications of HDL-C concentrations:HDL cholesterol  < 40 mg/dL  Low HDL cholesterolHDL cholesterol >= 60 mg/dL  High HDL cholesterolThe   following guidelines apply to HDL-Cholesterol: Good prognosis   Standard Risk   Risk Indicator   >60 mg/dL       40-59 mg/dL     <40 mg/dL      LDL Cholesterol 09/29/2023 118.2  mg/dL Final    Reference Range / Risk Factor Levels:        Optimal:   < 100   mg/dL  Above Optimal:   100-129 mg/dLBorderline High:   130-159 mg/dL           High:   160-189 mg/dL      Very High:   > 190   mg/dL    VLDL 09/29/2023 38  mg/dL Final    TSH 09/29/2023 0.641  0.358 - 3.74 uIU/mL Final   Orders Only on 09/29/2023   Component Date Value Ref Range Status    Estimated Avg Glucose 09/29/2023 193  mg/dL Final    Hemoglobin A1C 09/29/2023 7.6 (H)  4.2 - 6.3 % Final    Comment: Ranges for Hemoglobin A1C in diabetic patients:Hemoglobin A1C          Degree of glucose control      <7.0%             Well-controlled      >8.0%             Poorly-controlledDegrees of glucose control based on goals set by theAmerican Diabetes   Association.     Orders Only on 09/29/2023   Component Date Value Ref Range Status    Color, UA 09/29/2023 Colorless  Yellow Final    Appearance, UA 09/29/2023 Clear  Clear Final    pH, UA 09/29/2023 6.5  5.0 - 8.0 pH Final    Specific Gravity, UA 09/29/2023 1.019  1.005 - 1.030 Final    Protein, UA 09/29/2023 50 (A)  Negative mg/dL Final    Glucose, UA 09/29/2023 >1000 (A)  Negative mg/dL Final    Ketones, UA 09/29/2023 Negative  Negative mg/dL Final    Occult Blood UA 09/29/2023 Negative  Negative Final    Nitrite, UA 09/29/2023 Negative  Negative Final    Bilirubin (UA) 09/29/2023 Negative  Negative Final    Urobilinogen, UA 09/29/2023 Normal  Normal mg/dL Final    Leukocytes, UA 09/29/2023  Negative  Negative Laura/uL Final    Color, UA 09/29/2023 Colorless  Yellow Final    Appearance, UA 09/29/2023 Clear  Clear Final    pH, UA 09/29/2023 6.5  5.0 - 8.0 pH Final    Specific Gravity, UA 09/29/2023 1.019  1.005 - 1.030 Final    Protein, UA 09/29/2023 50 (A)  Negative mg/dL Final    Glucose, UA 09/29/2023 >1000 (A)  Negative mg/dL Final    Ketones, UA 09/29/2023 Negative  Negative mg/dL Final    Occult Blood UA 09/29/2023 Negative  Negative Final    Nitrite, UA 09/29/2023 Negative  Negative Final    Bilirubin (UA) 09/29/2023 Negative  Negative Final    Urobilinogen, UA 09/29/2023 Normal  Normal mg/dL Final    Leukocytes, UA 09/29/2023 Negative  Negative Laura/uL Final    RBC, UA 09/29/2023 0-2  0 - 2 /HPF Final    WBC, UA 09/29/2023 0-2  0 - 2 /HPF Final    Squam Epithel, UA 09/29/2023 Rare  0 - 1+ /LPF Final    Bacteria 09/29/2023 None  0-+/- /HPF Final    YEAST UA, AUTO 09/29/2023 Rare (A)  None Seen /HPF Final    Service Comment 03 09/29/2023 Yes, Criteria Met (A)   Final    A reflex culture will be added to this specimen unless thepatient has an existing culture ordered within the [id number].   Orders Only on 09/29/2023   Component Date Value Ref Range Status    WBC 09/29/2023 5.5  4.5 - 10.0 10*3/uL Final    RBC 09/29/2023 4.50  4.20 - 5.40 10*6/uL Final    Hemoglobin 09/29/2023 11.4 (L)  12.0 - 16.0 g/dL Final    Hematocrit 09/29/2023 37.6  37.0 - 47.0 % Final    MCV 09/29/2023 83.6  80.0 - 99.0 fL Final    MCH 09/29/2023 25.3 (L)  27.0 - 32.0 pg Final    MCHC 09/29/2023 30.3 (L)  32.0 - 36.0 % Final    RDW RBC Auto-Rto 09/29/2023 15.4  0.0 - 15.5 % Final    Platelets 09/29/2023 267  130 - 400 10*3/uL Final    MPV 09/29/2023 10.0  9.2 - 12.2 fL Final    Neutrophils 09/29/2023 51.4  50 - 80 % Final    Immature Granulocytes 09/29/2023 0.50 (H)  0.0 - 0.43 % Final    Lymphocytes 09/29/2023 35.0  20.0 - 45.0 % Final    Monocytes 09/29/2023 9.3  2 - 10 % Final    Eosinophils 09/29/2023 3.3  0 - 6 % Final     Basophils 2023 0.5  0 - 3 % Final    nRBC# 2023 0.0  0 - 0.2 % Final    Neutrophils Absolute 2023 2.8  1.4 - 7.0 10*3/uL Final    Immature Granulocytes Absolute 2023 0.0300  0.0 - 0.0310 thou/uL Final    Lymphocytes Absolute 2023 1.9  1.2 - 4.0 10*3/uL Final    Monocytes Absolute 2023 0.5  0.1 - 0.8 10*3/uL Final    Eosinophils Absolute 2023 0.2  0.0 - 0.6 10*3/uL Final    Basophils Absolute 2023 0.0  0.0 - 0.3 10*3/uL Final    nRBC Count Absolute 2023 0.000  0 - 0.012 thou/uL Final   Office Visit on 2023   Component Date Value Ref Range Status    Microalb, Ur 2023 413.5 (H)  0 - 20 mg/L Final    Creatinine, Urine 2023 62.8  10 - 175 mg/dL Final    Microalb/Creat Ratio 2023 658 (H)  0 - 30 mg/g Final      [facility] UNKNOWN RAD EAP    Result Date: 10/4/2023                                RADIOLOGY REPORT        PT NAME: ADELFO VALLADARES      [facility]     : 1965 F 58             [address]    ACCT: [id number]                                              [facility] Rec #: [id number]                                        [id number]    Patient Location: [facility]/             Procedure: ZEE SCREEN MELY W CAD INA    REQUISITION #: [id number]      REPORT #: [id number]           DATE OF EXAM: 10/04/23    TIME OF EXAM: 1145       ZEE SCREEN MELY W CAD INA, 10/4/2023 11:20 AM CDT        Comparison:Studies dating back to 2016, most recently 2020.        History: Screening exam, family history.        Findings:    Standard CC and MLO views of both breasts were performed. [employer] computer-aided    detection software was utilized in the interpretation of this examination.   3-D tomography was performed of both breasts in CC and MLO projections.        The breasts consist of scattered fibroglandular elements. No suspicious   mass, architectural distortion or suspicious calcification is identified.   There  has been no suspicious interval change since the previous exam.        IMPRESSION:        No mammographic evidence of malignancy. The patient should return for annual   mammogram in one year.        BI-RADS: 1- Negative Mammogram                    BI-RADS classification:              0 - Assessment is incomplete.      1 - Negative mammogram.      2 - Benign finding - negative.      3 - Probably benign finding - short interval followup suggested.      4 - Suspicious abnormality - biopsy should be considered.      5 - Highly suggestive of malignancy - appropriate action should be taken.      6 - Known biopsy - proven malignancy - appropriate action should be taken.            COMMENT:  A negative report should not delay biopsy if a dominant or   clinically suspicious mass is present.  Approximately 8 to 10 percent of   cancers are not identified by x-ray.  Dense breasts may obscure an   underlying neoplasm.        NOTE:  Information is entered into a reminder system for a target due date   for the next mammogram and a letter is sent to the patient to schedule their   next mammogram.                DICTATING PHYSICIAN:  YAMIL RAUSCH MD                   Date Dictated: 10/04/23 1214        Signed By:  YAMIL RAUSCH MD <Electronically signed by YAMIL RAUSCH MD in OV>    Date Signed:  10/04/23 1216     CC: KRISHNA AVALOS MD ; KRISHNA AVALOS MD      ADMITTING PHYSICIAN:                                                                                                    ORDERING PHY: KRISHNA AVALOS MD                                                                                                                                                      ATTENDING PHY: KRISHNA AVALOS MD    Patient Status:  REG CLI    Admit Service Date: 10/04/23               Duration of encounter: 30 minutes  This includes face-to-face time and non face-to-face time preparing to see the patient (eg, review of tests),  obtaining and/or reviewing separately obtained history, documenting clinical information in the electronic or other health record, independently interpreting resultsand communicating results to the patient/family/caregiver, or care coordination.      All diagnostic data (labs/imaging) was reviewed with the patient and/or family member in the room.  All questions were answered to their liking. The patient and/or family member voiced understanding of all instructions provided. Expectations regarding follow up and treatment plan were voiced and confirmed prior to departure. The patient was given orders/instructions at the end of the visit for reference. They were instructed to notify my office if they have not been contacted for imaging/referrals/labs/results in 1-2 weeks. They voiced understanding of all of the above.     Follow up:     Patient Instructions     Typhus Fever Discharge Instructions   About this topic   Typhus fever is an infection caused by a germ. Ticks, fleas, and lice transfer their germs when they bite our skin. You can also get the germ from the stool of infected fleas and lice. Sometimes, you may inhale dust which contains the germs. The germ infects your blood and muscles. It can damage your tissues and cause serious health problems. If this is not treated, death may occur.  This illness is treated with drugs. You may also need extra fluids and oxygen until you start to feel better.       What care is needed at home?   Ask your doctor what you need to do when you go home. Make sure you ask questions if you do not understand what the doctor says. This way you will know what you need to do.  Your doctor will give you drugs to treat the infection and help with pain. Take the whole course of your drugs. Do not skip doses.  Drink 6 to 8 glasses of liquids each day. This will help prevent fluid loss.  Avoid picking your wound scabs. Let them fall off on their own.  Suck on ice chips or lozenges to help  with throat pain from too much coughing.  Put a cool mist humidifier in your room to keep your throat and air passages moist.  What follow-up care is needed?   Your condition needs close monitoring. Your doctor may ask you to make visits to the office to check on your progress. Be sure to keep these visits.  What drugs may be needed?   The doctor may order drugs to:  Fight an infection  Lower fever  Help with pain  Will physical activity be limited?   The signs of typhus fever can affect your daily activities. Get plenty of rest. Sleep when you are feeling tired. Avoid doing tiring activities. Ask your doctor about the right amount of activity for you.  What changes to diet are needed?   If you have been throwing up, start with clear liquids. If you keep those down, slowly start to eat mild, softer foods such as crackers, toast, or applesauce.  What problems could happen?   Body tissue dies  Kidney problems  Lung problems  Lung infection  Brain damage  Nerve damage  Death  What can be done to prevent this health problem?   If you see a tick on your skin, remove the tick right away. Use tweezers and carefully remove the tick by using steady pressure. Do not twist or jerk the tick so the entire tick comes out. Do not squeeze it. Clean the area on your skin with alcohol after it is removed. Put the tick in a jar or Ziploc bag in the freezer if your doctor suggests you keep it for tests. Otherwise, flush the tick down the toilet.  Protect yourself from ticks and fleas.  Wear long pants and shirts or jackets with long sleeves when in wooded areas.  Tuck pants into socks or boots.  Use an insect repellent when going outside. Check the label to make sure it has 10% to 30% DEET (N, N-diethyl-meta-tolumide).  Protect your pets from ticks and fleas by using special collars, drugs, or shampoos.  Spray insect repellent to get rid of ticks and fleas around your home.  Cover your mouth and nose with a mask when going to shital  places.  If your work involves cleaning dirty houses and old buildings where stray animals with fleas or lice may live, wear proper protective clothing.  When do I need to call the doctor?   Fever of 100.4°F (38°C) or higher, chills  Pain not relieved by drugs  Changes in behavior  Rash or dark bite pino  Problems breathing or swallowing  Reddening or swelling in your legs or arms that will not heal and seems to be getting worse   You are not feeling better in 2 to 3 days or you are feeling worse  Teach Back: Helping You Understand   http://www.nhs.uk/conditions/lupus/pages/introduction.aspx  I can tell you about my condition.  I can tell you what changes I need to make with my drugs or activities.  I can tell you ways to help prevent this infection.  I can tell you what I will do if I have a fever, pain, or trouble breathing or swallowing.  Where can I learn more?   Centers for Disease Control and Prevention  https://www.cdc.gov/typhus/epidemic/index.html   World Health Organization  https://www.who.int/news-room/fact-sheet/detail/typhoid   Last Reviewed Date   2021-10-05  Consumer Information Use and Disclaimer   This information is not specific medical advice and does not replace information you receive from your health care provider. This is only a brief summary of general information. It does NOT include all information about conditions, illnesses, injuries, tests, procedures, treatments, therapies, discharge instructions or life-style choices that may apply to you. You must talk with your health care provider for complete information about your health and treatment options. This information should not be used to decide whether or not to accept your health care providers advice, instructions or recommendations. Only your health care provider has the knowledge and training to provide advice that is right for you.  Copyright   Copyright © 2021 [employer] and its affiliates and/or licensors. All rights  reserved.           Follow up if symptoms worsen or fail to improve.

## 2023-10-23 NOTE — ASSESSMENT & PLAN NOTE
According to hospital records, the doxycycline was held.  Unable to determine if she received the full course of the regimen needed (10 days per IDSA).       PLAN    1. Doxycycline 100 mg bid x 10 days per guidelines. No further testing needed.   2. F/u as needed.

## 2023-10-25 ENCOUNTER — OFFICE VISIT (OUTPATIENT)
Dept: INFECTIOUS DISEASES | Facility: CLINIC | Age: 58
End: 2023-10-25
Payer: MEDICAID

## 2023-10-25 VITALS — OXYGEN SATURATION: 98 % | SYSTOLIC BLOOD PRESSURE: 110 MMHG | HEART RATE: 96 BPM | DIASTOLIC BLOOD PRESSURE: 68 MMHG

## 2023-10-25 DIAGNOSIS — A75.9: Primary | ICD-10-CM

## 2023-10-25 PROCEDURE — 99214 PR OFFICE/OUTPT VISIT, EST, LEVL IV, 30-39 MIN: ICD-10-PCS | Mod: S$GLB,,, | Performed by: NURSE PRACTITIONER

## 2023-10-25 PROCEDURE — 3051F PR MOST RECENT HEMOGLOBIN A1C LEVEL 7.0 - < 8.0%: ICD-10-PCS | Mod: CPTII,S$GLB,, | Performed by: NURSE PRACTITIONER

## 2023-10-25 PROCEDURE — 99214 OFFICE O/P EST MOD 30 MIN: CPT | Mod: S$GLB,,, | Performed by: NURSE PRACTITIONER

## 2023-10-25 PROCEDURE — 3078F PR MOST RECENT DIASTOLIC BLOOD PRESSURE < 80 MM HG: ICD-10-PCS | Mod: CPTII,S$GLB,, | Performed by: NURSE PRACTITIONER

## 2023-10-25 PROCEDURE — 1159F MED LIST DOCD IN RCRD: CPT | Mod: CPTII,S$GLB,, | Performed by: NURSE PRACTITIONER

## 2023-10-25 PROCEDURE — 3074F SYST BP LT 130 MM HG: CPT | Mod: CPTII,S$GLB,, | Performed by: NURSE PRACTITIONER

## 2023-10-25 PROCEDURE — 3062F POS MACROALBUMINURIA REV: CPT | Mod: CPTII,S$GLB,, | Performed by: NURSE PRACTITIONER

## 2023-10-25 PROCEDURE — 3062F PR POS MACROALBUMINURIA RESULT DOCUMENTED/REVIEW: ICD-10-PCS | Mod: CPTII,S$GLB,, | Performed by: NURSE PRACTITIONER

## 2023-10-25 PROCEDURE — 4010F PR ACE/ARB THEARPY RXD/TAKEN: ICD-10-PCS | Mod: CPTII,S$GLB,, | Performed by: NURSE PRACTITIONER

## 2023-10-25 PROCEDURE — 3051F HG A1C>EQUAL 7.0%<8.0%: CPT | Mod: CPTII,S$GLB,, | Performed by: NURSE PRACTITIONER

## 2023-10-25 PROCEDURE — 3066F PR DOCUMENTATION OF TREATMENT FOR NEPHROPATHY: ICD-10-PCS | Mod: CPTII,S$GLB,, | Performed by: NURSE PRACTITIONER

## 2023-10-25 PROCEDURE — 1159F PR MEDICATION LIST DOCUMENTED IN MEDICAL RECORD: ICD-10-PCS | Mod: CPTII,S$GLB,, | Performed by: NURSE PRACTITIONER

## 2023-10-25 PROCEDURE — 3066F NEPHROPATHY DOC TX: CPT | Mod: CPTII,S$GLB,, | Performed by: NURSE PRACTITIONER

## 2023-10-25 PROCEDURE — 3078F DIAST BP <80 MM HG: CPT | Mod: CPTII,S$GLB,, | Performed by: NURSE PRACTITIONER

## 2023-10-25 PROCEDURE — 4010F ACE/ARB THERAPY RXD/TAKEN: CPT | Mod: CPTII,S$GLB,, | Performed by: NURSE PRACTITIONER

## 2023-10-25 PROCEDURE — 3074F PR MOST RECENT SYSTOLIC BLOOD PRESSURE < 130 MM HG: ICD-10-PCS | Mod: CPTII,S$GLB,, | Performed by: NURSE PRACTITIONER

## 2023-10-25 RX ORDER — DOXYCYCLINE HYCLATE 100 MG
100 TABLET ORAL 2 TIMES DAILY
Qty: 20 TABLET | Refills: 0 | Status: SHIPPED | OUTPATIENT
Start: 2023-10-25 | End: 2023-11-04

## 2023-10-25 NOTE — PATIENT INSTRUCTIONS
Typhus Fever Discharge Instructions   About this topic   Typhus fever is an infection caused by a germ. Ticks, fleas, and lice transfer their germs when they bite our skin. You can also get the germ from the stool of infected fleas and lice. Sometimes, you may inhale dust which contains the germs. The germ infects your blood and muscles. It can damage your tissues and cause serious health problems. If this is not treated, death may occur.  This illness is treated with drugs. You may also need extra fluids and oxygen until you start to feel better.       What care is needed at home?   Ask your doctor what you need to do when you go home. Make sure you ask questions if you do not understand what the doctor says. This way you will know what you need to do.  Your doctor will give you drugs to treat the infection and help with pain. Take the whole course of your drugs. Do not skip doses.  Drink 6 to 8 glasses of liquids each day. This will help prevent fluid loss.  Avoid picking your wound scabs. Let them fall off on their own.  Suck on ice chips or lozenges to help with throat pain from too much coughing.  Put a cool mist humidifier in your room to keep your throat and air passages moist.  What follow-up care is needed?   Your condition needs close monitoring. Your doctor may ask you to make visits to the office to check on your progress. Be sure to keep these visits.  What drugs may be needed?   The doctor may order drugs to:  Fight an infection  Lower fever  Help with pain  Will physical activity be limited?   The signs of typhus fever can affect your daily activities. Get plenty of rest. Sleep when you are feeling tired. Avoid doing tiring activities. Ask your doctor about the right amount of activity for you.  What changes to diet are needed?   If you have been throwing up, start with clear liquids. If you keep those down, slowly start to eat mild, softer foods such as crackers, toast, or applesauce.  What problems  could happen?   Body tissue dies  Kidney problems  Lung problems  Lung infection  Brain damage  Nerve damage  Death  What can be done to prevent this health problem?   If you see a tick on your skin, remove the tick right away. Use tweezers and carefully remove the tick by using steady pressure. Do not twist or jerk the tick so the entire tick comes out. Do not squeeze it. Clean the area on your skin with alcohol after it is removed. Put the tick in a jar or Ziploc bag in the freezer if your doctor suggests you keep it for tests. Otherwise, flush the tick down the toilet.  Protect yourself from ticks and fleas.  Wear long pants and shirts or jackets with long sleeves when in wooded areas.  Tuck pants into socks or boots.  Use an insect repellent when going outside. Check the label to make sure it has 10% to 30% DEET (N, N-diethyl-meta-tolumide).  Protect your pets from ticks and fleas by using special collars, drugs, or shampoos.  Spray insect repellent to get rid of ticks and fleas around your home.  Cover your mouth and nose with a mask when going to shital places.  If your work involves cleaning dirty houses and old buildings where stray animals with fleas or lice may live, wear proper protective clothing.  When do I need to call the doctor?   Fever of 100.4°F (38°C) or higher, chills  Pain not relieved by drugs  Changes in behavior  Rash or dark bite pino  Problems breathing or swallowing  Reddening or swelling in your legs or arms that will not heal and seems to be getting worse   You are not feeling better in 2 to 3 days or you are feeling worse  Teach Back: Helping You Understand   http://www.nhs.uk/conditions/lupus/pages/introduction.aspx  I can tell you about my condition.  I can tell you what changes I need to make with my drugs or activities.  I can tell you ways to help prevent this infection.  I can tell you what I will do if I have a fever, pain, or trouble breathing or swallowing.  Where can I learn  more?   Centers for Disease Control and Prevention  https://www.cdc.gov/typhus/epidemic/index.html   World Health Organization  https://www.who.int/news-room/fact-sheet/detail/typhoid   Last Reviewed Date   2021-10-05  Consumer Information Use and Disclaimer   This information is not specific medical advice and does not replace information you receive from your health care provider. This is only a brief summary of general information. It does NOT include all information about conditions, illnesses, injuries, tests, procedures, treatments, therapies, discharge instructions or life-style choices that may apply to you. You must talk with your health care provider for complete information about your health and treatment options. This information should not be used to decide whether or not to accept your health care providers advice, instructions or recommendations. Only your health care provider has the knowledge and training to provide advice that is right for you.  Copyright   Copyright © 2021 [employer] and its affiliates and/or licensors. All rights reserved.

## 2023-10-31 ENCOUNTER — OFFICE VISIT (OUTPATIENT)
Dept: PRIMARY CARE CLINIC | Facility: CLINIC | Age: 58
End: 2023-10-31
Payer: MEDICAID

## 2023-10-31 VITALS
SYSTOLIC BLOOD PRESSURE: 106 MMHG | RESPIRATION RATE: 16 BRPM | BODY MASS INDEX: 28.66 KG/M2 | OXYGEN SATURATION: 99 % | WEIGHT: 172 LBS | HEIGHT: 65 IN | TEMPERATURE: 98 F | DIASTOLIC BLOOD PRESSURE: 71 MMHG | HEART RATE: 92 BPM

## 2023-10-31 DIAGNOSIS — E03.9 ACQUIRED HYPOTHYROIDISM: ICD-10-CM

## 2023-10-31 DIAGNOSIS — Z79.4 TYPE 2 DIABETES MELLITUS WITH DIABETIC POLYNEUROPATHY, WITH LONG-TERM CURRENT USE OF INSULIN: Primary | ICD-10-CM

## 2023-10-31 DIAGNOSIS — E11.42 TYPE 2 DIABETES MELLITUS WITH DIABETIC POLYNEUROPATHY, WITH LONG-TERM CURRENT USE OF INSULIN: Primary | ICD-10-CM

## 2023-10-31 DIAGNOSIS — E78.2 MIXED HYPERLIPIDEMIA: ICD-10-CM

## 2023-10-31 PROCEDURE — 3066F NEPHROPATHY DOC TX: CPT | Mod: CPTII,S$GLB,, | Performed by: INTERNAL MEDICINE

## 2023-10-31 PROCEDURE — 2023F DILAT RTA XM W/O RTNOPTHY: CPT | Mod: CPTII,S$GLB,, | Performed by: INTERNAL MEDICINE

## 2023-10-31 PROCEDURE — 3062F POS MACROALBUMINURIA REV: CPT | Mod: CPTII,S$GLB,, | Performed by: INTERNAL MEDICINE

## 2023-10-31 PROCEDURE — 1160F RVW MEDS BY RX/DR IN RCRD: CPT | Mod: CPTII,S$GLB,, | Performed by: INTERNAL MEDICINE

## 2023-10-31 PROCEDURE — 4010F PR ACE/ARB THEARPY RXD/TAKEN: ICD-10-PCS | Mod: CPTII,S$GLB,, | Performed by: INTERNAL MEDICINE

## 2023-10-31 PROCEDURE — 95251 PR GLUCOSE MONITOR, 72 HOUR, PHYS INTERP: ICD-10-PCS | Mod: S$GLB,,, | Performed by: INTERNAL MEDICINE

## 2023-10-31 PROCEDURE — 99214 OFFICE O/P EST MOD 30 MIN: CPT | Mod: 25,S$GLB,, | Performed by: INTERNAL MEDICINE

## 2023-10-31 PROCEDURE — 3008F PR BODY MASS INDEX (BMI) DOCUMENTED: ICD-10-PCS | Mod: CPTII,S$GLB,, | Performed by: INTERNAL MEDICINE

## 2023-10-31 PROCEDURE — 2023F PR DILATED RETINAL EXAM W/O EVID OF RETINOPATHY: ICD-10-PCS | Mod: CPTII,S$GLB,, | Performed by: INTERNAL MEDICINE

## 2023-10-31 PROCEDURE — 3051F PR MOST RECENT HEMOGLOBIN A1C LEVEL 7.0 - < 8.0%: ICD-10-PCS | Mod: CPTII,S$GLB,, | Performed by: INTERNAL MEDICINE

## 2023-10-31 PROCEDURE — 3051F HG A1C>EQUAL 7.0%<8.0%: CPT | Mod: CPTII,S$GLB,, | Performed by: INTERNAL MEDICINE

## 2023-10-31 PROCEDURE — 1160F PR REVIEW ALL MEDS BY PRESCRIBER/CLIN PHARMACIST DOCUMENTED: ICD-10-PCS | Mod: CPTII,S$GLB,, | Performed by: INTERNAL MEDICINE

## 2023-10-31 PROCEDURE — 1159F PR MEDICATION LIST DOCUMENTED IN MEDICAL RECORD: ICD-10-PCS | Mod: CPTII,S$GLB,, | Performed by: INTERNAL MEDICINE

## 2023-10-31 PROCEDURE — 4010F ACE/ARB THERAPY RXD/TAKEN: CPT | Mod: CPTII,S$GLB,, | Performed by: INTERNAL MEDICINE

## 2023-10-31 PROCEDURE — 3074F SYST BP LT 130 MM HG: CPT | Mod: CPTII,S$GLB,, | Performed by: INTERNAL MEDICINE

## 2023-10-31 PROCEDURE — 99214 PR OFFICE/OUTPT VISIT, EST, LEVL IV, 30-39 MIN: ICD-10-PCS | Mod: 25,S$GLB,, | Performed by: INTERNAL MEDICINE

## 2023-10-31 PROCEDURE — 3078F DIAST BP <80 MM HG: CPT | Mod: CPTII,S$GLB,, | Performed by: INTERNAL MEDICINE

## 2023-10-31 PROCEDURE — 95251 CONT GLUC MNTR ANALYSIS I&R: CPT | Mod: S$GLB,,, | Performed by: INTERNAL MEDICINE

## 2023-10-31 PROCEDURE — 3008F BODY MASS INDEX DOCD: CPT | Mod: CPTII,S$GLB,, | Performed by: INTERNAL MEDICINE

## 2023-10-31 PROCEDURE — 3066F PR DOCUMENTATION OF TREATMENT FOR NEPHROPATHY: ICD-10-PCS | Mod: CPTII,S$GLB,, | Performed by: INTERNAL MEDICINE

## 2023-10-31 PROCEDURE — 1159F MED LIST DOCD IN RCRD: CPT | Mod: CPTII,S$GLB,, | Performed by: INTERNAL MEDICINE

## 2023-10-31 PROCEDURE — 3062F PR POS MACROALBUMINURIA RESULT DOCUMENTED/REVIEW: ICD-10-PCS | Mod: CPTII,S$GLB,, | Performed by: INTERNAL MEDICINE

## 2023-10-31 PROCEDURE — 3074F PR MOST RECENT SYSTOLIC BLOOD PRESSURE < 130 MM HG: ICD-10-PCS | Mod: CPTII,S$GLB,, | Performed by: INTERNAL MEDICINE

## 2023-10-31 PROCEDURE — 3078F PR MOST RECENT DIASTOLIC BLOOD PRESSURE < 80 MM HG: ICD-10-PCS | Mod: CPTII,S$GLB,, | Performed by: INTERNAL MEDICINE

## 2023-10-31 RX ORDER — ATORVASTATIN CALCIUM 80 MG/1
80 TABLET, FILM COATED ORAL DAILY
Qty: 90 TABLET | Refills: 3 | Status: SHIPPED | OUTPATIENT
Start: 2023-10-31

## 2023-10-31 RX ORDER — ALBUTEROL SULFATE 0.83 MG/ML
2.5 SOLUTION RESPIRATORY (INHALATION) EVERY 6 HOURS PRN
COMMUNITY
End: 2023-12-21

## 2023-10-31 RX ORDER — EZETIMIBE 10 MG/1
10 TABLET ORAL DAILY
Qty: 90 TABLET | Refills: 3 | Status: SHIPPED | OUTPATIENT
Start: 2023-10-31 | End: 2024-10-30

## 2023-10-31 RX ORDER — ATORVASTATIN CALCIUM 80 MG/1
80 TABLET, FILM COATED ORAL DAILY
COMMUNITY
End: 2023-10-31 | Stop reason: SDUPTHER

## 2023-10-31 RX ORDER — TRAZODONE HYDROCHLORIDE 50 MG/1
50 TABLET ORAL NIGHTLY
COMMUNITY

## 2023-10-31 RX ORDER — ONDANSETRON 4 MG/1
4 TABLET, ORALLY DISINTEGRATING ORAL 2 TIMES DAILY
COMMUNITY

## 2023-10-31 RX ORDER — KETOCONAZOLE 20 MG/G
CREAM TOPICAL DAILY
COMMUNITY

## 2023-10-31 NOTE — PROGRESS NOTES
Subjective:      Patient ID: Nati Buenrostro is a 58 y.o. female.    Chief Complaint: Dizziness (C/o dizziness and reports episodes of hypotension )    HPI:  Diabetes with last A1c of 7.6 markedly improved from 10.4.  Patient reports compliance with medication.  Patient has CGM placed and the data is downloaded.  Patient average blood sugar is 185 with standard deviation 49.  Patient blood sugars are in range 53% of the time high 37% of the time and very high 10% of the time.  Patient denies polyuria polydipsia, numbness in hands or feet.     Patient today reports of and dizziness on and off that can happen any time lasting for a few seconds on change of position and improved on its own.     Patient is under care of Infectious diseases for abnormal CT scan. 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  Patient reports symptoms are getting better but still has some fatigue.      Review of Systems   Constitutional:  Positive for malaise/fatigue. Negative for chills, diaphoresis, fever and weight loss.   HENT:  Negative for congestion, ear pain, sinus pain, sore throat and tinnitus.    Eyes:  Negative for blurred vision and photophobia.   Respiratory:  Negative for cough, hemoptysis, shortness of breath and wheezing.    Cardiovascular:  Negative for chest pain, palpitations, orthopnea, leg swelling and PND.   Gastrointestinal:  Negative for abdominal pain, blood in stool, constipation, diarrhea, heartburn, melena, nausea and vomiting.   Genitourinary:  Negative for dysuria, frequency and urgency.   Musculoskeletal:  Negative for back pain, myalgias and neck pain.   Skin:  Negative for rash.   Neurological:  Negative for dizziness, tremors, seizures, loss of consciousness and weakness.   Endo/Heme/Allergies:  Negative for polydipsia.   Psychiatric/Behavioral:  Negative for depression and hallucinations. The patient does not have insomnia.      Objective:   /76 (BP Location: Left  "arm, Patient Position: Sitting, BP Method: Medium (Automatic))   Pulse 82   Temp 97.6 °F (36.4 °C) (Temporal)   Resp 16   Ht 5' 5" (1.651 m)   Wt 78 kg (172 lb)   SpO2 99%   BMI 28.62 kg/m²     Physical Exam:  Patient not examined  Assessment:       ICD-10-CM ICD-9-CM   1. Type 2 diabetes mellitus with diabetic polyneuropathy, with long-term current use of insulin  E11.42 250.60    Z79.4 357.2     V58.67   2. Acquired hypothyroidism  E03.9 244.9   3. Mixed hyperlipidemia  E78.2 272.2       Plan:     Patient with diabetes with last A1c of 7.6 markedly improved from 10.3  Will continue same medication  CGM data suggest that blood sugars are under good control  Patient home blood pressures are under good control  Patient has some dizziness on changing position and orthostatic blood pressures are checked  Patient blood pressure dropped a little on changing position but do not meet the criteria of orthostasis  Keeping in mind patient dizziness coincide with patient blood pressure running on lower side.  Will hold Jardiance for now.  Advised patient to discuss with nephrologist and consider decreasing ACE-inhibitor as patient is already on ARB  Patient last TSH is at goal.  Will continue medication  Patient has hyperlipidemia and last LDL of 118 is not at goal  Will increase atorvastatin 40 mg  Medication List with Changes/Refills   Current Medications    ALBUTEROL (PROVENTIL) 2.5 MG /3 ML (0.083 %) NEBULIZER SOLUTION    Take 2.5 mg by nebulization every 6 (six) hours as needed for Wheezing. Rescue    ALBUTEROL SULFATE 90 MCG/ACTUATION AEBS    Inhale 2 puffs into the lungs daily as needed.    ATORVASTATIN (LIPITOR) 80 MG TABLET    Take 80 mg by mouth once daily.    BENAZEPRIL (LOTENSIN) 40 MG TABLET    Take 40 mg by mouth once daily.    BUDESONIDE-FORMOTEROL 160-4.5 MCG (SYMBICORT) 160-4.5 MCG/ACTUATION HFAA    Inhale 2 puffs into the lungs 2 (two) times a day.    BUSPIRONE (BUSPAR) 10 MG TABLET    Take 10 mg by " mouth every morning.    CHOLECALCIFEROL, VITAMIN D3, (VITAMIN D3) 25 MCG (1,000 UNIT) CAPSULE    Take 1 capsule by mouth once daily.    DICYCLOMINE HCL (DICYCLOMINE ORAL)    Take 20 mg by mouth daily as needed.    DOXYCYCLINE (VIBRA-TABS) 100 MG TABLET    Take 1 tablet (100 mg total) by mouth 2 (two) times daily. for 10 days    DULAGLUTIDE (TRULICITY) 1.5 MG/0.5 ML PEN INJECTOR    Inject 1.5 mg into the skin every 7 days.    EMPAGLIFLOZIN (JARDIANCE) 25 MG TABLET    Take 1 tablet (25 mg total) by mouth once daily.    INSULIN ASPART U-100 (NOVOLOG) 100 UNIT/ML (3 ML) INPN PEN    Inject 12 Units into the skin 3 (three) times daily with meals.    INSULIN GLARGINE 100 UNITS/ML SUBQ PEN    Inject 42 Units into the skin 2 (two) times a day.    KETOCONAZOLE (NIZORAL) 2 % CREAM    Apply topically once daily.    LEVOTHYROXINE (SYNTHROID) 150 MCG TABLET    Take 1 tablet (150 mcg total) by mouth before breakfast.    LOSARTAN (COZAAR) 50 MG TABLET    Take 1 tablet (50 mg total) by mouth once daily.    MAGNESIUM OXIDE 500 MG CAP    Take 1 tablet by mouth once daily.    MONTELUKAST (SINGULAIR) 10 MG TABLET    Take 1 tablet by mouth every evening.    OMEPRAZOLE (PRILOSEC) 20 MG CAPSULE    Take 1 capsule (20 mg total) by mouth once daily.    ONDANSETRON (ZOFRAN-ODT) 4 MG TBDL    Take 4 mg by mouth 2 (two) times daily.    SUMATRIPTAN (IMITREX) 100 MG TABLET    Take 100 mg by mouth. Take one tablet as needed no more than 2 daily    TRAZODONE (DESYREL) 50 MG TABLET    Take 50 mg by mouth every evening.   Discontinued Medications    BENZONATATE (TESSALON) 100 MG CAPSULE    Take 1 capsule (100 mg total) by mouth 3 (three) times daily as needed for Cough.

## 2023-11-01 LAB
% SATURATION: 17 % (ref 20–50)
FERRITIN SERPL-MCNC: 131.4 NG/ML (ref 8–252)
IRON: 55 UG/DL (ref 50–170)
TOTAL IRON BINDING CAPACITY: 321 UG/DL (ref 250–450)

## 2023-11-03 DIAGNOSIS — D50.9 IRON DEFICIENCY ANEMIA, UNSPECIFIED IRON DEFICIENCY ANEMIA TYPE: Primary | ICD-10-CM

## 2023-11-07 ENCOUNTER — PATIENT MESSAGE (OUTPATIENT)
Dept: PRIMARY CARE CLINIC | Facility: CLINIC | Age: 58
End: 2023-11-07
Payer: MEDICAID

## 2023-11-07 RX ORDER — LOSARTAN POTASSIUM 25 MG/1
25 TABLET ORAL
COMMUNITY
Start: 2023-11-03

## 2023-11-21 ENCOUNTER — TELEPHONE (OUTPATIENT)
Dept: PRIMARY CARE CLINIC | Facility: CLINIC | Age: 58
End: 2023-11-21
Payer: MEDICAID

## 2023-12-08 ENCOUNTER — OFFICE VISIT (OUTPATIENT)
Dept: PRIMARY CARE CLINIC | Facility: CLINIC | Age: 58
End: 2023-12-08
Payer: MEDICAID

## 2023-12-08 VITALS
WEIGHT: 167.13 LBS | TEMPERATURE: 98 F | HEART RATE: 86 BPM | HEIGHT: 65 IN | BODY MASS INDEX: 27.85 KG/M2 | OXYGEN SATURATION: 97 % | DIASTOLIC BLOOD PRESSURE: 78 MMHG | SYSTOLIC BLOOD PRESSURE: 128 MMHG | RESPIRATION RATE: 16 BRPM

## 2023-12-08 DIAGNOSIS — E11.42 TYPE 2 DIABETES MELLITUS WITH DIABETIC POLYNEUROPATHY, WITH LONG-TERM CURRENT USE OF INSULIN: Primary | ICD-10-CM

## 2023-12-08 DIAGNOSIS — Z12.11 COLON CANCER SCREENING: ICD-10-CM

## 2023-12-08 DIAGNOSIS — Z79.4 TYPE 2 DIABETES MELLITUS WITH DIABETIC POLYNEUROPATHY, WITH LONG-TERM CURRENT USE OF INSULIN: Primary | ICD-10-CM

## 2023-12-08 PROCEDURE — 3051F PR MOST RECENT HEMOGLOBIN A1C LEVEL 7.0 - < 8.0%: ICD-10-PCS | Mod: CPTII,S$GLB,, | Performed by: INTERNAL MEDICINE

## 2023-12-08 PROCEDURE — 3062F POS MACROALBUMINURIA REV: CPT | Mod: CPTII,S$GLB,, | Performed by: INTERNAL MEDICINE

## 2023-12-08 PROCEDURE — 4010F ACE/ARB THERAPY RXD/TAKEN: CPT | Mod: CPTII,S$GLB,, | Performed by: INTERNAL MEDICINE

## 2023-12-08 PROCEDURE — 3074F SYST BP LT 130 MM HG: CPT | Mod: CPTII,S$GLB,, | Performed by: INTERNAL MEDICINE

## 2023-12-08 PROCEDURE — 3008F PR BODY MASS INDEX (BMI) DOCUMENTED: ICD-10-PCS | Mod: CPTII,S$GLB,, | Performed by: INTERNAL MEDICINE

## 2023-12-08 PROCEDURE — 99214 OFFICE O/P EST MOD 30 MIN: CPT | Mod: 25,S$GLB,, | Performed by: INTERNAL MEDICINE

## 2023-12-08 PROCEDURE — 1159F MED LIST DOCD IN RCRD: CPT | Mod: CPTII,S$GLB,, | Performed by: INTERNAL MEDICINE

## 2023-12-08 PROCEDURE — 95251 PR GLUCOSE MONITOR, 72 HOUR, PHYS INTERP: ICD-10-PCS | Mod: S$GLB,,, | Performed by: INTERNAL MEDICINE

## 2023-12-08 PROCEDURE — 4010F PR ACE/ARB THEARPY RXD/TAKEN: ICD-10-PCS | Mod: CPTII,S$GLB,, | Performed by: INTERNAL MEDICINE

## 2023-12-08 PROCEDURE — 99214 PR OFFICE/OUTPT VISIT, EST, LEVL IV, 30-39 MIN: ICD-10-PCS | Mod: 25,S$GLB,, | Performed by: INTERNAL MEDICINE

## 2023-12-08 PROCEDURE — 1159F PR MEDICATION LIST DOCUMENTED IN MEDICAL RECORD: ICD-10-PCS | Mod: CPTII,S$GLB,, | Performed by: INTERNAL MEDICINE

## 2023-12-08 PROCEDURE — 3062F PR POS MACROALBUMINURIA RESULT DOCUMENTED/REVIEW: ICD-10-PCS | Mod: CPTII,S$GLB,, | Performed by: INTERNAL MEDICINE

## 2023-12-08 PROCEDURE — 3078F DIAST BP <80 MM HG: CPT | Mod: CPTII,S$GLB,, | Performed by: INTERNAL MEDICINE

## 2023-12-08 PROCEDURE — 3074F PR MOST RECENT SYSTOLIC BLOOD PRESSURE < 130 MM HG: ICD-10-PCS | Mod: CPTII,S$GLB,, | Performed by: INTERNAL MEDICINE

## 2023-12-08 PROCEDURE — 95251 CONT GLUC MNTR ANALYSIS I&R: CPT | Mod: S$GLB,,, | Performed by: INTERNAL MEDICINE

## 2023-12-08 PROCEDURE — 2023F PR DILATED RETINAL EXAM W/O EVID OF RETINOPATHY: ICD-10-PCS | Mod: CPTII,S$GLB,, | Performed by: INTERNAL MEDICINE

## 2023-12-08 PROCEDURE — 3008F BODY MASS INDEX DOCD: CPT | Mod: CPTII,S$GLB,, | Performed by: INTERNAL MEDICINE

## 2023-12-08 PROCEDURE — 2023F DILAT RTA XM W/O RTNOPTHY: CPT | Mod: CPTII,S$GLB,, | Performed by: INTERNAL MEDICINE

## 2023-12-08 PROCEDURE — 3066F PR DOCUMENTATION OF TREATMENT FOR NEPHROPATHY: ICD-10-PCS | Mod: CPTII,S$GLB,, | Performed by: INTERNAL MEDICINE

## 2023-12-08 PROCEDURE — 3051F HG A1C>EQUAL 7.0%<8.0%: CPT | Mod: CPTII,S$GLB,, | Performed by: INTERNAL MEDICINE

## 2023-12-08 PROCEDURE — 3066F NEPHROPATHY DOC TX: CPT | Mod: CPTII,S$GLB,, | Performed by: INTERNAL MEDICINE

## 2023-12-08 PROCEDURE — 3078F PR MOST RECENT DIASTOLIC BLOOD PRESSURE < 80 MM HG: ICD-10-PCS | Mod: CPTII,S$GLB,, | Performed by: INTERNAL MEDICINE

## 2023-12-09 NOTE — PROGRESS NOTES
Subjective:      Patient ID: Nati Buenrostro is a 58 y.o. female.    Chief Complaint: Diabetes (6wk f/u reports blood sugar dropped to 55 around 5am)    :  Patient with diabetes with last A1c of 7.6 markedly improved from 10.4.  Patient is here for follow-up and the CGM data is downloaded and it suggest that average blood sugar is running 245 with standard deviation of 90.  Patient blood sugars are in range 25% of the time running high 30% of the time and very high 43% of the time.  Patient is taking Lantus 42 units twice a day and NovoLog 15 units before each meal.  Patient reports sometimes she forgets taking Lantus 2nd dose and that may contribute to high blood sugar.  A few days patient blood sugars seem to be running persistently high probably secondary to missing Lantus dose.  Today patient blood sugars were dropping frequently routinely it 50s.  Patient reports compliance with medication and has lost 5 lb.  Patient is also taking Trulicity    Patient on last visit complained of dizziness that has markedly improved.  Patient was being treated for typhus fever by Infectious Disease and patient has completed her course of antibiotics    Review of Systems   Constitutional:  Positive for weight loss (5 lb weight loss). Negative for chills, diaphoresis, fever and malaise/fatigue.   HENT:  Negative for congestion, ear pain, sinus pain, sore throat and tinnitus.    Eyes:  Negative for blurred vision and photophobia.   Respiratory:  Negative for cough, hemoptysis, shortness of breath and wheezing.    Cardiovascular:  Negative for chest pain, palpitations, orthopnea, leg swelling and PND.   Gastrointestinal:  Negative for abdominal pain, blood in stool, constipation, diarrhea, heartburn, melena, nausea and vomiting.   Genitourinary:  Negative for dysuria, frequency and urgency.   Musculoskeletal:  Negative for back pain, myalgias and neck pain.   Skin:  Negative for rash.   Neurological:  Negative for dizziness,  "tremors, seizures, loss of consciousness and weakness.   Endo/Heme/Allergies:  Negative for polydipsia.   Psychiatric/Behavioral:  Negative for depression and hallucinations. The patient does not have insomnia.      Objective:   /78 (BP Location: Left arm, Patient Position: Sitting, BP Method: Medium (Automatic))   Pulse 86   Temp 98 °F (36.7 °C) (Temporal)   Resp 16   Ht 5' 5" (1.651 m)   Wt 75.8 kg (167 lb 1.6 oz)   SpO2 97%   BMI 27.81 kg/m²     Physical Exam:  Patient not examined  Assessment:       ICD-10-CM ICD-9-CM   1. Type 2 diabetes mellitus with diabetic polyneuropathy, with long-term current use of insulin  E11.42 250.60    Z79.4 357.2     V58.67   2. Colon cancer screening  Z12.11 V76.51       Plan:     Patient with diabetes with last A1c of 7.6 is still not at goal  Patient blood sugar in last 2 weeks seem to be running high.  Probably secondary to missing 2nd dose of Lantus.  Advised patient to take medication as prescribed  Patient has multiple hypoglycemic episodes early in the morning.  Will decrease Lantus to 40 units twice a day  Advised patient to continue NovoLog 15 units before each meal and also use sliding scale  Close follow-up is recommended  Insulin pump is discussed with patient and I think with multiple insulin doses patient will be a good candidate for insulin pump    Medication List with Changes/Refills   Current Medications    ALBUTEROL (PROVENTIL) 2.5 MG /3 ML (0.083 %) NEBULIZER SOLUTION    Take 2.5 mg by nebulization every 6 (six) hours as needed for Wheezing. Rescue    ALBUTEROL SULFATE 90 MCG/ACTUATION AEBS    Inhale 2 puffs into the lungs daily as needed.    ATORVASTATIN (LIPITOR) 80 MG TABLET    Take 1 tablet (80 mg total) by mouth once daily.    BENAZEPRIL (LOTENSIN) 40 MG TABLET    Take 40 mg by mouth once daily.    BUDESONIDE-FORMOTEROL 160-4.5 MCG (SYMBICORT) 160-4.5 MCG/ACTUATION HFAA    Inhale 2 puffs into the lungs 2 (two) times a day.    BUSPIRONE (BUSPAR) " 10 MG TABLET    Take 10 mg by mouth every morning.    CHOLECALCIFEROL, VITAMIN D3, (VITAMIN D3) 25 MCG (1,000 UNIT) CAPSULE    Take 1 capsule by mouth once daily.    DICYCLOMINE HCL (DICYCLOMINE ORAL)    Take 20 mg by mouth daily as needed.    DULAGLUTIDE (TRULICITY) 1.5 MG/0.5 ML PEN INJECTOR    Inject 1.5 mg into the skin every 7 days.    EMPAGLIFLOZIN (JARDIANCE) 25 MG TABLET    Take 1 tablet (25 mg total) by mouth once daily.    EZETIMIBE (ZETIA) 10 MG TABLET    Take 1 tablet (10 mg total) by mouth once daily.    INSULIN ASPART U-100 (NOVOLOG) 100 UNIT/ML (3 ML) INPN PEN    Inject 12 Units into the skin 3 (three) times daily with meals.    INSULIN GLARGINE 100 UNITS/ML SUBQ PEN    Inject 42 Units into the skin 2 (two) times a day.    KETOCONAZOLE (NIZORAL) 2 % CREAM    Apply topically once daily.    LEVOTHYROXINE (SYNTHROID) 150 MCG TABLET    Take 1 tablet (150 mcg total) by mouth before breakfast.    LOSARTAN (COZAAR) 25 MG TABLET    Take 25 mg by mouth.    MAGNESIUM OXIDE 500 MG CAP    Take 1 tablet by mouth once daily.    MONTELUKAST (SINGULAIR) 10 MG TABLET    Take 1 tablet by mouth every evening.    OMEPRAZOLE (PRILOSEC) 20 MG CAPSULE    Take 1 capsule (20 mg total) by mouth once daily.    ONDANSETRON (ZOFRAN-ODT) 4 MG TBDL    Take 4 mg by mouth 2 (two) times daily.    SUMATRIPTAN (IMITREX) 100 MG TABLET    Take 100 mg by mouth. Take one tablet as needed no more than 2 daily    TRAZODONE (DESYREL) 50 MG TABLET    Take 50 mg by mouth every evening.

## 2023-12-15 ENCOUNTER — PATIENT MESSAGE (OUTPATIENT)
Dept: PRIMARY CARE CLINIC | Facility: CLINIC | Age: 58
End: 2023-12-15
Payer: MEDICAID

## 2023-12-20 DIAGNOSIS — E11.42 TYPE 2 DIABETES MELLITUS WITH DIABETIC POLYNEUROPATHY, WITH LONG-TERM CURRENT USE OF INSULIN: Primary | ICD-10-CM

## 2023-12-20 DIAGNOSIS — Z79.4 TYPE 2 DIABETES MELLITUS WITH DIABETIC POLYNEUROPATHY, WITH LONG-TERM CURRENT USE OF INSULIN: Primary | ICD-10-CM

## 2023-12-20 RX ORDER — INSULIN PMP CART,AUT,G6/7,CNTR
1 EACH SUBCUTANEOUS
Qty: 2 EACH | Refills: 11 | Status: SHIPPED | OUTPATIENT
Start: 2023-12-20 | End: 2024-01-11 | Stop reason: SDUPTHER

## 2023-12-20 RX ORDER — INSULIN PMP CART,AUT,G6/7,CNTR
1 EACH SUBCUTANEOUS DAILY
Qty: 1 EACH | Refills: 0 | Status: SHIPPED | OUTPATIENT
Start: 2023-12-20 | End: 2024-01-11 | Stop reason: SDUPTHER

## 2023-12-20 RX ORDER — BLOOD-GLUCOSE SENSOR
1 EACH MISCELLANEOUS
Qty: 3 EACH | Refills: 11 | Status: SHIPPED | OUTPATIENT
Start: 2023-12-20 | End: 2024-01-11 | Stop reason: SDUPTHER

## 2023-12-21 ENCOUNTER — OFFICE VISIT (OUTPATIENT)
Dept: PRIMARY CARE CLINIC | Facility: CLINIC | Age: 58
End: 2023-12-21
Payer: MEDICAID

## 2023-12-21 VITALS
DIASTOLIC BLOOD PRESSURE: 84 MMHG | RESPIRATION RATE: 16 BRPM | TEMPERATURE: 98 F | HEART RATE: 104 BPM | OXYGEN SATURATION: 98 % | BODY MASS INDEX: 28.79 KG/M2 | HEIGHT: 65 IN | WEIGHT: 172.81 LBS | SYSTOLIC BLOOD PRESSURE: 115 MMHG

## 2023-12-21 DIAGNOSIS — Z79.4 TYPE 2 DIABETES MELLITUS WITH DIABETIC POLYNEUROPATHY, WITH LONG-TERM CURRENT USE OF INSULIN: Primary | ICD-10-CM

## 2023-12-21 DIAGNOSIS — E11.42 TYPE 2 DIABETES MELLITUS WITH DIABETIC POLYNEUROPATHY, WITH LONG-TERM CURRENT USE OF INSULIN: Primary | ICD-10-CM

## 2023-12-21 PROCEDURE — 3066F NEPHROPATHY DOC TX: CPT | Mod: CPTII,S$GLB,, | Performed by: INTERNAL MEDICINE

## 2023-12-21 PROCEDURE — 3008F PR BODY MASS INDEX (BMI) DOCUMENTED: ICD-10-PCS | Mod: CPTII,S$GLB,, | Performed by: INTERNAL MEDICINE

## 2023-12-21 PROCEDURE — 3008F BODY MASS INDEX DOCD: CPT | Mod: CPTII,S$GLB,, | Performed by: INTERNAL MEDICINE

## 2023-12-21 PROCEDURE — 3051F HG A1C>EQUAL 7.0%<8.0%: CPT | Mod: CPTII,S$GLB,, | Performed by: INTERNAL MEDICINE

## 2023-12-21 PROCEDURE — 99214 OFFICE O/P EST MOD 30 MIN: CPT | Mod: 25,S$GLB,, | Performed by: INTERNAL MEDICINE

## 2023-12-21 PROCEDURE — 3066F PR DOCUMENTATION OF TREATMENT FOR NEPHROPATHY: ICD-10-PCS | Mod: CPTII,S$GLB,, | Performed by: INTERNAL MEDICINE

## 2023-12-21 PROCEDURE — 3074F SYST BP LT 130 MM HG: CPT | Mod: CPTII,S$GLB,, | Performed by: INTERNAL MEDICINE

## 2023-12-21 PROCEDURE — 99214 PR OFFICE/OUTPT VISIT, EST, LEVL IV, 30-39 MIN: ICD-10-PCS | Mod: 25,S$GLB,, | Performed by: INTERNAL MEDICINE

## 2023-12-21 PROCEDURE — 3079F DIAST BP 80-89 MM HG: CPT | Mod: CPTII,S$GLB,, | Performed by: INTERNAL MEDICINE

## 2023-12-21 PROCEDURE — 4010F ACE/ARB THERAPY RXD/TAKEN: CPT | Mod: CPTII,S$GLB,, | Performed by: INTERNAL MEDICINE

## 2023-12-21 PROCEDURE — 4010F PR ACE/ARB THEARPY RXD/TAKEN: ICD-10-PCS | Mod: CPTII,S$GLB,, | Performed by: INTERNAL MEDICINE

## 2023-12-21 PROCEDURE — 95251 PR GLUCOSE MONITOR, 72 HOUR, PHYS INTERP: ICD-10-PCS | Mod: S$GLB,,, | Performed by: INTERNAL MEDICINE

## 2023-12-21 PROCEDURE — 2023F PR DILATED RETINAL EXAM W/O EVID OF RETINOPATHY: ICD-10-PCS | Mod: CPTII,S$GLB,, | Performed by: INTERNAL MEDICINE

## 2023-12-21 PROCEDURE — 2023F DILAT RTA XM W/O RTNOPTHY: CPT | Mod: CPTII,S$GLB,, | Performed by: INTERNAL MEDICINE

## 2023-12-21 PROCEDURE — 3074F PR MOST RECENT SYSTOLIC BLOOD PRESSURE < 130 MM HG: ICD-10-PCS | Mod: CPTII,S$GLB,, | Performed by: INTERNAL MEDICINE

## 2023-12-21 PROCEDURE — 95251 CONT GLUC MNTR ANALYSIS I&R: CPT | Mod: S$GLB,,, | Performed by: INTERNAL MEDICINE

## 2023-12-21 PROCEDURE — 3079F PR MOST RECENT DIASTOLIC BLOOD PRESSURE 80-89 MM HG: ICD-10-PCS | Mod: CPTII,S$GLB,, | Performed by: INTERNAL MEDICINE

## 2023-12-21 PROCEDURE — 3051F PR MOST RECENT HEMOGLOBIN A1C LEVEL 7.0 - < 8.0%: ICD-10-PCS | Mod: CPTII,S$GLB,, | Performed by: INTERNAL MEDICINE

## 2023-12-21 PROCEDURE — 3062F POS MACROALBUMINURIA REV: CPT | Mod: CPTII,S$GLB,, | Performed by: INTERNAL MEDICINE

## 2023-12-21 PROCEDURE — 3062F PR POS MACROALBUMINURIA RESULT DOCUMENTED/REVIEW: ICD-10-PCS | Mod: CPTII,S$GLB,, | Performed by: INTERNAL MEDICINE

## 2023-12-21 NOTE — PROGRESS NOTES
"  Subjective:      Patient ID: Nati Buenrostro is a 58 y.o. female.    Chief Complaint: Diabetes (2wk f/u )    HPI:  Patient with diabetes with last A1c of 7.6 markedly improved from 10.4.  Patient was previously evaluated secondary to multiple hypoglycemic episodes in the morning.  CGM was placed and the data is downloaded.  Data suggest that patient average blood sugar is running 180 with standard deviation of 59.  Patient blood sugars are in range 59% of the time running high 28% of the time and very high 11% of the time.  Patient has some very low blood sugars reaching 50s but those are before changing dose of insulin.    ROS:  Not performed  Objective:   /84 (BP Location: Left arm, Patient Position: Sitting, BP Method: Medium (Automatic))   Pulse 104   Temp 97.6 °F (36.4 °C) (Temporal)   Resp 16   Ht 5' 5" (1.651 m)   Wt 78.4 kg (172 lb 12.8 oz)   SpO2 98%   BMI 28.76 kg/m²     Physical Exam:  Not performed  Assessment:       ICD-10-CM ICD-9-CM   1. Type 2 diabetes mellitus with diabetic polyneuropathy, with long-term current use of insulin  E11.42 250.60    Z79.4 357.2     V58.67       Plan:     Patient with diabetes and last A1c of 7.6 seem under better control than 10.4  Patient hypoglycemic episodes are markedly improved after decreasing dose of insulin  Will continue to monitor at this time  Advised patient to adhere with diet.  Will repeat labs on return    Medication List with Changes/Refills   Current Medications    ALBUTEROL (PROVENTIL) 2.5 MG /3 ML (0.083 %) NEBULIZER SOLUTION    Take 2.5 mg by nebulization every 6 (six) hours as needed for Wheezing. Rescue    ALBUTEROL SULFATE 90 MCG/ACTUATION AEBS    Inhale 2 puffs into the lungs daily as needed.    ATORVASTATIN (LIPITOR) 80 MG TABLET    Take 1 tablet (80 mg total) by mouth once daily.    BENAZEPRIL (LOTENSIN) 40 MG TABLET    Take 40 mg by mouth once daily.    BLOOD-GLUCOSE SENSOR (DEXCOM G7 SENSOR) RIDDHI    1 each by Misc.(Non-Drug; Combo " Route) route every 10 days.    BUDESONIDE-FORMOTEROL 160-4.5 MCG (SYMBICORT) 160-4.5 MCG/ACTUATION HFAA    Inhale 2 puffs into the lungs 2 (two) times a day.    BUSPIRONE (BUSPAR) 10 MG TABLET    Take 10 mg by mouth every morning.    CHOLECALCIFEROL, VITAMIN D3, (VITAMIN D3) 25 MCG (1,000 UNIT) CAPSULE    Take 1 capsule by mouth once daily.    DICYCLOMINE HCL (DICYCLOMINE ORAL)    Take 20 mg by mouth daily as needed.    DULAGLUTIDE (TRULICITY) 1.5 MG/0.5 ML PEN INJECTOR    Inject 1.5 mg into the skin every 7 days.    EMPAGLIFLOZIN (JARDIANCE) 25 MG TABLET    Take 1 tablet (25 mg total) by mouth once daily.    EZETIMIBE (ZETIA) 10 MG TABLET    Take 1 tablet (10 mg total) by mouth once daily.    INSULIN ASPART U-100 (NOVOLOG) 100 UNIT/ML (3 ML) INPN PEN    Inject 12 Units into the skin 3 (three) times daily with meals.    INSULIN GLARGINE 100 UNITS/ML SUBQ PEN    Inject 42 Units into the skin 2 (two) times a day.    INSULIN PUMP CART,AUTO,BT-CNTR (OMNIPOD 5 G6 INTRO KIT, GEN 5,) CRTG    1 kit by Other route once daily.    INSULIN PUMP CART,AUTOMATED,BT (OMNIPOD 5 G6 PODS, GEN 5,) CRTG    Inject 1 each into the skin every 72 hours.    KETOCONAZOLE (NIZORAL) 2 % CREAM    Apply topically once daily.    LEVOTHYROXINE (SYNTHROID) 150 MCG TABLET    Take 1 tablet (150 mcg total) by mouth before breakfast.    LOSARTAN (COZAAR) 25 MG TABLET    Take 25 mg by mouth.    MAGNESIUM OXIDE 500 MG CAP    Take 1 tablet by mouth once daily.    MONTELUKAST (SINGULAIR) 10 MG TABLET    Take 1 tablet by mouth every evening.    OMEPRAZOLE (PRILOSEC) 20 MG CAPSULE    Take 1 capsule (20 mg total) by mouth once daily.    ONDANSETRON (ZOFRAN-ODT) 4 MG TBDL    Take 4 mg by mouth 2 (two) times daily.    SUMATRIPTAN (IMITREX) 100 MG TABLET    Take 100 mg by mouth. Take one tablet as needed no more than 2 daily    TRAZODONE (DESYREL) 50 MG TABLET    Take 50 mg by mouth every evening.

## 2023-12-27 DIAGNOSIS — Z79.4 TYPE 2 DIABETES MELLITUS WITH DIABETIC POLYNEUROPATHY, WITH LONG-TERM CURRENT USE OF INSULIN: Primary | ICD-10-CM

## 2023-12-27 DIAGNOSIS — E11.42 TYPE 2 DIABETES MELLITUS WITH DIABETIC POLYNEUROPATHY, WITH LONG-TERM CURRENT USE OF INSULIN: Primary | ICD-10-CM

## 2023-12-27 NOTE — TELEPHONE ENCOUNTER
----- Message from Leonor Newman LPN sent at 12/26/2023 10:54 PM CST -----    ----- Message -----  From: Marianna Garza  Sent: 12/26/2023  10:58 AM CST  To: [facility] Staff    [facility] calling about script dexcom and omnipod pt needs p/a for script.  They can be reached at [phone] and fax [phone].  They would like script to be sent local.       [facility] [address] [place] [address] - [address] [facility]  [address] [facility] [address]  Phone: [phone] Fax: [phone]

## 2024-01-02 RX ORDER — INSULIN PMP CART,AUT,G6/7,CNTR
1 EACH SUBCUTANEOUS
Qty: 2 EACH | Refills: 11 | OUTPATIENT
Start: 2024-01-02 | End: 2025-01-01

## 2024-01-02 RX ORDER — BLOOD-GLUCOSE SENSOR
1 EACH MISCELLANEOUS
Qty: 3 EACH | Refills: 11 | OUTPATIENT
Start: 2024-01-02 | End: 2025-01-01

## 2024-01-02 RX ORDER — INSULIN PMP CART,AUT,G6/7,CNTR
1 EACH SUBCUTANEOUS DAILY
Qty: 1 EACH | Refills: 0 | OUTPATIENT
Start: 2024-01-02 | End: 2025-01-01

## 2024-01-03 ENCOUNTER — OFFICE VISIT (OUTPATIENT)
Dept: SURGERY | Facility: CLINIC | Age: 59
End: 2024-01-03
Payer: MEDICAID

## 2024-01-03 VITALS — BODY MASS INDEX: 28.62 KG/M2 | WEIGHT: 172 LBS

## 2024-01-03 DIAGNOSIS — Z80.0 FAMILY HISTORY OF COLON CANCER: ICD-10-CM

## 2024-01-03 DIAGNOSIS — Z86.010 HISTORY OF COLON POLYPS: Primary | ICD-10-CM

## 2024-01-03 DIAGNOSIS — D50.9 IRON DEFICIENCY ANEMIA, UNSPECIFIED IRON DEFICIENCY ANEMIA TYPE: ICD-10-CM

## 2024-01-03 PROCEDURE — 1160F RVW MEDS BY RX/DR IN RCRD: CPT | Mod: CPTII,S$GLB,, | Performed by: SURGERY

## 2024-01-03 PROCEDURE — 99204 OFFICE O/P NEW MOD 45 MIN: CPT | Mod: S$GLB,,, | Performed by: SURGERY

## 2024-01-03 PROCEDURE — 3008F BODY MASS INDEX DOCD: CPT | Mod: CPTII,S$GLB,, | Performed by: SURGERY

## 2024-01-03 PROCEDURE — 1159F MED LIST DOCD IN RCRD: CPT | Mod: CPTII,S$GLB,, | Performed by: SURGERY

## 2024-01-03 NOTE — PROGRESS NOTES
Orders & consents for colonoscopy faxed to  fx [phone] and medical clearance faxed to Dr. Cunha's office fx [phone]. Both faxes successfully went through.

## 2024-01-03 NOTE — PROGRESS NOTES
Subjective:       Patient ID: Nati Buenrostro is a 58 y.o. female.    Chief Complaint: Consult (For bleeding with stool- colonoscopy consult)      58-year-old female with family history colon cancer multiple relatives, history iron-deficiency anemia and previous polyps seen previous colonoscopies.  Sent here for repeat colonoscopy to evaluate for source of iron deficiency.  The currently having any melena or hematochezia.      Review of Systems   Constitutional:  Negative for chills and fever.   HENT:  Negative for nasal congestion and rhinorrhea.    Eyes:  Negative for discharge and visual disturbance.   Respiratory:  Negative for shortness of breath and wheezing.    Cardiovascular:  Negative for chest pain and palpitations.   Gastrointestinal: Negative.  Negative for abdominal distention, abdominal pain, anal bleeding, blood in stool, constipation, diarrhea, nausea, rectal pain and vomiting.   Endocrine: Negative for cold intolerance and heat intolerance.   Genitourinary:  Negative for difficulty urinating and frequency.   Musculoskeletal:  Negative for back pain and myalgias.   Integumentary:  Negative for pallor and rash.   Neurological:  Negative for dizziness and headaches.   Hematological:  Negative for adenopathy. Does not bruise/bleed easily.   Psychiatric/Behavioral:  Negative for behavioral problems. The patient is not nervous/anxious.          Objective:      Physical Exam  Constitutional:       Appearance: Normal appearance. She is well-developed.   HENT:      Head: Normocephalic and atraumatic.   Eyes:      General: Lids are normal.      Conjunctiva/sclera: Conjunctivae normal.   Neck:      Trachea: Trachea normal.   Cardiovascular:      Rate and Rhythm: Normal rate and regular rhythm.      Heart sounds: Normal heart sounds.   Pulmonary:      Effort: Pulmonary effort is normal.      Breath sounds: Normal breath sounds.   Abdominal:      General: Bowel sounds are normal. There is no distension.       Palpations: Abdomen is soft.      Tenderness: There is no abdominal tenderness.      Hernia: No hernia is present.   Musculoskeletal:      Cervical back: Normal range of motion.   Skin:     General: Skin is warm and dry.   Neurological:      Mental Status: She is alert and oriented to person, place, and time.   Psychiatric:         Speech: Speech normal.         Behavior: Behavior normal. Behavior is cooperative.         Assessment:       Problem List Items Addressed This Visit    None  Visit Diagnoses       History of colon polyps    -  Primary    Iron deficiency anemia, unspecified iron deficiency anemia type        Family history of colon cancer                Plan:       58-year-old female with family history of colon cancer, history of colon polyps found on previous colonoscopies and iron-deficiency anemia.  Patient will be scheduled for a diagnostic colonoscopy.

## 2024-01-09 RX ORDER — SOD SULF/POT CHLORIDE/MAG SULF 1.479 G
12 TABLET ORAL DAILY
Qty: 24 TABLET | Refills: 0 | Status: SHIPPED | OUTPATIENT
Start: 2024-01-09

## 2024-01-10 ENCOUNTER — OUTSIDE PLACE OF SERVICE (OUTPATIENT)
Dept: SURGERY | Facility: CLINIC | Age: 59
End: 2024-01-10

## 2024-01-10 ENCOUNTER — PATIENT MESSAGE (OUTPATIENT)
Dept: PRIMARY CARE CLINIC | Facility: CLINIC | Age: 59
End: 2024-01-10
Payer: MEDICAID

## 2024-01-10 DIAGNOSIS — Z79.4 TYPE 2 DIABETES MELLITUS WITH DIABETIC POLYNEUROPATHY, WITH LONG-TERM CURRENT USE OF INSULIN: ICD-10-CM

## 2024-01-10 DIAGNOSIS — E11.42 TYPE 2 DIABETES MELLITUS WITH DIABETIC POLYNEUROPATHY, WITH LONG-TERM CURRENT USE OF INSULIN: ICD-10-CM

## 2024-01-10 LAB — GLUCOSE SERPL-MCNC: 142 MG/DL (ref 70–105)

## 2024-01-10 PROCEDURE — 44385 ENDOSCOPY OF BOWEL POUCH: CPT | Mod: PT,,, | Performed by: SURGERY

## 2024-01-10 PROCEDURE — 44380 SMALL BOWEL ENDOSCOPY BR/WA: CPT | Mod: PT,59,, | Performed by: SURGERY

## 2024-01-11 NOTE — TELEPHONE ENCOUNTER
Patient needs new order for insulin pump kit/ refills and dexcom at [facility], [facility] is unable to ship to patient.

## 2024-01-12 RX ORDER — INSULIN PMP CART,AUT,G6/7,CNTR
1 EACH SUBCUTANEOUS
Qty: 2 EACH | Refills: 11 | Status: SHIPPED | OUTPATIENT
Start: 2024-01-12 | End: 2024-02-15 | Stop reason: SDUPTHER

## 2024-01-12 RX ORDER — INSULIN PMP CART,AUT,G6/7,CNTR
1 EACH SUBCUTANEOUS DAILY
Qty: 1 EACH | Refills: 0 | Status: SHIPPED | OUTPATIENT
Start: 2024-01-12 | End: 2025-01-11

## 2024-01-12 RX ORDER — BLOOD-GLUCOSE SENSOR
1 EACH MISCELLANEOUS
Qty: 3 EACH | Refills: 11 | Status: SHIPPED | OUTPATIENT
Start: 2024-01-12 | End: 2025-01-11

## 2024-01-17 ENCOUNTER — OFFICE VISIT (OUTPATIENT)
Dept: SURGERY | Facility: CLINIC | Age: 59
End: 2024-01-17
Payer: MEDICAID

## 2024-01-17 VITALS — BODY MASS INDEX: 28.66 KG/M2 | HEIGHT: 65 IN | WEIGHT: 172 LBS

## 2024-01-17 DIAGNOSIS — D12.6 TUBULAR ADENOMA OF COLON: Primary | ICD-10-CM

## 2024-01-17 PROCEDURE — 99214 OFFICE O/P EST MOD 30 MIN: CPT | Mod: S$GLB,,, | Performed by: SURGERY

## 2024-01-17 PROCEDURE — 3008F BODY MASS INDEX DOCD: CPT | Mod: CPTII,S$GLB,, | Performed by: SURGERY

## 2024-01-17 PROCEDURE — 1159F MED LIST DOCD IN RCRD: CPT | Mod: CPTII,S$GLB,, | Performed by: SURGERY

## 2024-01-17 PROCEDURE — 1160F RVW MEDS BY RX/DR IN RCRD: CPT | Mod: CPTII,S$GLB,, | Performed by: SURGERY

## 2024-01-17 NOTE — PROGRESS NOTES
Subjective:       Patient ID: Nati Buenrostro is a 58 y.o. female.    Chief Complaint: Follow-up      58-year-old female who had a colonoscopy for bleeding, patient was found have 2 colon polyps at 40 cm.  One was quite small and taken with snare, the 2nd was larger broad-based and was not able to be taken colonoscopy.  Biopsies were taken which was positive for tubular adenoma.  Patient is here to discuss pathology.      Review of Systems   Constitutional:  Negative for chills and fever.   HENT:  Negative for nasal congestion and rhinorrhea.    Eyes:  Negative for discharge and visual disturbance.   Respiratory:  Negative for shortness of breath and wheezing.    Cardiovascular:  Negative for chest pain and palpitations.   Gastrointestinal: Negative.  Negative for abdominal distention, abdominal pain, anal bleeding, blood in stool, constipation, diarrhea, nausea, rectal pain and vomiting.   Endocrine: Negative for cold intolerance and heat intolerance.   Genitourinary:  Negative for difficulty urinating and frequency.   Musculoskeletal:  Negative for back pain and myalgias.   Integumentary:  Negative for pallor and rash.   Neurological:  Negative for dizziness and headaches.   Hematological:  Negative for adenopathy. Does not bruise/bleed easily.   Psychiatric/Behavioral:  Negative for behavioral problems. The patient is not nervous/anxious.          Objective:      Physical Exam  Constitutional:       Appearance: Normal appearance. She is well-developed.   HENT:      Head: Normocephalic and atraumatic.   Eyes:      General: Lids are normal.      Conjunctiva/sclera: Conjunctivae normal.   Neck:      Trachea: Trachea normal.   Cardiovascular:      Rate and Rhythm: Normal rate and regular rhythm.      Heart sounds: Normal heart sounds.   Pulmonary:      Effort: Pulmonary effort is normal.      Breath sounds: Normal breath sounds.   Abdominal:      General: Bowel sounds are normal. There is no distension.       Palpations: Abdomen is soft.      Tenderness: There is no abdominal tenderness.      Hernia: No hernia is present.   Musculoskeletal:      Cervical back: Normal range of motion.   Skin:     General: Skin is warm and dry.   Neurological:      Mental Status: She is alert and oriented to person, place, and time.   Psychiatric:         Speech: Speech normal.         Behavior: Behavior normal. Behavior is cooperative.         Assessment:       Problem List Items Addressed This Visit    None  Visit Diagnoses       Tubular adenoma of colon    -  Primary            Plan:       58-year-old female with a large tubular adenoma, this likely the source of her GI bleeding and anemia, this was not bleeding at the time of surgery.  Considering the size and the flat nature of this I am not able to remove this with colonoscopy, did give the option of piecemeal in the polyp out which may necessitate multiple colonoscopies versus surgical excision.  The patient would not like to have any more colonoscopies at this time would rather have it surgically excised, will have the patient set up for a robotic sigmoidectomy.

## 2024-01-19 ENCOUNTER — CLINICAL SUPPORT (OUTPATIENT)
Dept: OBSTETRICS AND GYNECOLOGY | Facility: CLINIC | Age: 59
End: 2024-01-19
Payer: MEDICAID

## 2024-01-19 DIAGNOSIS — Z79.4 TYPE 2 DIABETES MELLITUS WITH DIABETIC POLYNEUROPATHY, WITH LONG-TERM CURRENT USE OF INSULIN: Primary | ICD-10-CM

## 2024-01-19 DIAGNOSIS — Z01.89 ROUTINE LAB DRAW: Primary | ICD-10-CM

## 2024-01-19 DIAGNOSIS — E11.42 TYPE 2 DIABETES MELLITUS WITH DIABETIC POLYNEUROPATHY, WITH LONG-TERM CURRENT USE OF INSULIN: Primary | ICD-10-CM

## 2024-01-19 LAB
ABS NRBC COUNT: 0 THOU/UL (ref 0–0.01)
ABSOLUTE BASOPHIL: 0 10*3/UL (ref 0–0.3)
ABSOLUTE EOSINOPHIL: 0.1 10*3/UL (ref 0–0.6)
ABSOLUTE IMMATURE GRAN: 0 THOU/UL (ref 0–0.03)
ABSOLUTE LYMPHOCYTE: 2.1 10*3/UL (ref 1.2–4)
ABSOLUTE MONOCYTE: 0.4 10*3/UL (ref 0.1–0.8)
ALBUMIN SERPL BCP-MCNC: 3.9 G/DL (ref 3.4–5)
ALP SERPL-CCNC: 94 U/L (ref 45–117)
ALT SERPL W P-5'-P-CCNC: 43 U/L (ref 13–56)
ANION GAP SERPL CALC-SCNC: 4 MMOL/L (ref 3–11)
AST SERPL-CCNC: 15 U/L (ref 15–37)
BASOPHILS NFR BLD: 0.7 % (ref 0–3)
BILIRUB SERPL-MCNC: 0.2 MG/DL (ref 0.2–1)
BUN SERPL-MCNC: 21 MG/DL (ref 7–18)
BUN/CREAT SERPL: 24.13 RATIO
CALCIUM SERPL-MCNC: 10.3 MG/DL (ref 8.5–10.1)
CHLORIDE SERPL-SCNC: 101 MMOL/L (ref 98–107)
CO2 SERPL-SCNC: 32 MMOL/L (ref 21–32)
CREAT SERPL-MCNC: 0.87 MG/DL (ref 0.55–1.02)
CREATININE, URINE: 112.4 MG/DL (ref 10–175)
EOSINOPHIL NFR BLD: 2.2 % (ref 0–6)
ERYTHROCYTE [DISTWIDTH] IN BLOOD BY AUTOMATED COUNT: 13.4 % (ref 0–15.5)
ESTIMATED AVG GLUCOSE: 200 MG/DL
GFR ESTIMATION: > 60
GLUCOSE SERPL-MCNC: 136 MG/DL (ref 74–106)
HBA1C MFR BLD: 7.8 % (ref 4.2–6.3)
HCT VFR BLD AUTO: 43.7 % (ref 37–47)
HGB BLD-MCNC: 13.4 G/DL (ref 12–16)
IMMATURE GRANULOCYTES: 0 % (ref 0–0.43)
LYMPHOCYTES NFR BLD: 37.9 % (ref 20–45)
MCH RBC QN AUTO: 24.2 PG (ref 27–32)
MCHC RBC AUTO-ENTMCNC: 30.7 % (ref 32–36)
MCV RBC AUTO: 78.9 FL (ref 80–99)
MICROALBUMIN URINE: 643.1 MG/L (ref 0–20)
MICROALBUMIN/CREATININE RATIO: 572 MG/G (ref 0–30)
MONOCYTES NFR BLD: 7.6 % (ref 2–10)
NEUTROPHILS # BLD AUTO: 2.9 10*3/UL (ref 1.4–7)
NEUTROPHILS NFR BLD: 51.6 % (ref 50–80)
NUCLEATED RED BLOOD CELLS: 0 % (ref 0–0.2)
PLATELETS: 358 10*3/UL (ref 130–400)
PMV BLD AUTO: 10.9 FL (ref 9.2–12.2)
POTASSIUM SERPL-SCNC: 4 MMOL/L (ref 3.5–5.1)
PROT SERPL-MCNC: 7.8 G/DL (ref 6.4–8.2)
RBC # BLD AUTO: 5.54 10*6/UL (ref 4.2–5.4)
SODIUM BLD-SCNC: 137 MMOL/L (ref 131–143)
WBC # BLD: 5.5 10*3/UL (ref 4.5–10)

## 2024-01-19 PROCEDURE — 36415 COLL VENOUS BLD VENIPUNCTURE: CPT | Mod: S$GLB,,, | Performed by: INTERNAL MEDICINE

## 2024-01-23 ENCOUNTER — PATIENT MESSAGE (OUTPATIENT)
Dept: PRIMARY CARE CLINIC | Facility: CLINIC | Age: 59
End: 2024-01-23
Payer: MEDICAID

## 2024-01-24 DIAGNOSIS — D12.6 TUBULAR ADENOMA OF COLON: Primary | ICD-10-CM

## 2024-01-25 ENCOUNTER — OFFICE VISIT (OUTPATIENT)
Dept: PRIMARY CARE CLINIC | Facility: CLINIC | Age: 59
End: 2024-01-25
Payer: MEDICAID

## 2024-01-25 ENCOUNTER — CLINICAL SUPPORT (OUTPATIENT)
Dept: OBSTETRICS AND GYNECOLOGY | Facility: CLINIC | Age: 59
End: 2024-01-25
Payer: MEDICAID

## 2024-01-25 VITALS
DIASTOLIC BLOOD PRESSURE: 86 MMHG | SYSTOLIC BLOOD PRESSURE: 147 MMHG | RESPIRATION RATE: 18 BRPM | TEMPERATURE: 99 F | HEIGHT: 65 IN | OXYGEN SATURATION: 96 % | BODY MASS INDEX: 28.71 KG/M2 | WEIGHT: 172.31 LBS | HEART RATE: 65 BPM

## 2024-01-25 DIAGNOSIS — R91.1 SOLITARY PULMONARY NODULE: ICD-10-CM

## 2024-01-25 DIAGNOSIS — E11.42 TYPE 2 DIABETES MELLITUS WITH DIABETIC POLYNEUROPATHY, WITH LONG-TERM CURRENT USE OF INSULIN: Primary | ICD-10-CM

## 2024-01-25 DIAGNOSIS — Z01.89 ROUTINE LAB DRAW: Primary | ICD-10-CM

## 2024-01-25 DIAGNOSIS — Z79.4 TYPE 2 DIABETES MELLITUS WITH DIABETIC POLYNEUROPATHY, WITH LONG-TERM CURRENT USE OF INSULIN: Primary | ICD-10-CM

## 2024-01-25 DIAGNOSIS — E83.52 HYPERCALCEMIA: ICD-10-CM

## 2024-01-25 DIAGNOSIS — R10.2 SUPRAPUBIC PAIN: ICD-10-CM

## 2024-01-25 DIAGNOSIS — R71.8 MICROCYTOSIS: Primary | ICD-10-CM

## 2024-01-25 LAB
% SATURATION: 18 % (ref 20–50)
25(OH)D3 SERPL-MCNC: 18 NG/ML
APPEARANCE, UA: CLEAR
BACTERIA SPEC CULT: ABNORMAL /HPF
BILIRUB UR QL STRIP: NEGATIVE
COLOR UR: COLORLESS
FERRITIN SERPL-MCNC: 109.7 NG/ML (ref 8–252)
GLUCOSE (UA): >1000 MG/DL
GLUCOSE SERPL-MCNC: 173 MG/DL (ref 70–110)
HGB UR QL STRIP: NEGATIVE
IRON: 59 UG/DL (ref 50–170)
KETONES UR QL STRIP: NEGATIVE MG/DL
LEUKOCYTE ESTERASE UR QL STRIP: NEGATIVE LEU/UL
NITRITE UR QL STRIP: NEGATIVE
PH UR STRIP: 7.5 PH (ref 5–8)
PROT UR QL STRIP: 70 MG/DL
PTH, INTACT: 50.4 PG/ML (ref 18.5–88)
RBC #/AREA URNS HPF: ABNORMAL /HPF (ref 0–2)
SERVICE COMMENT 03: ABNORMAL
SP GR UR STRIP: 1.02 (ref 1–1.03)
SQUAMOUS EPITHELIAL, UA: ABNORMAL /LPF
TOTAL IRON BINDING CAPACITY: 330 UG/DL (ref 250–450)
UROBILINOGEN UR STRIP-ACNC: NORMAL MG/DL
WBC #/AREA URNS HPF: ABNORMAL /HPF (ref 0–2)

## 2024-01-25 PROCEDURE — 1159F MED LIST DOCD IN RCRD: CPT | Mod: CPTII,S$GLB,, | Performed by: INTERNAL MEDICINE

## 2024-01-25 PROCEDURE — 99214 OFFICE O/P EST MOD 30 MIN: CPT | Mod: S$GLB,,, | Performed by: INTERNAL MEDICINE

## 2024-01-25 PROCEDURE — 3077F SYST BP >= 140 MM HG: CPT | Mod: CPTII,S$GLB,, | Performed by: INTERNAL MEDICINE

## 2024-01-25 PROCEDURE — 95251 CONT GLUC MNTR ANALYSIS I&R: CPT | Mod: S$GLB,,, | Performed by: INTERNAL MEDICINE

## 2024-01-25 PROCEDURE — 3051F HG A1C>EQUAL 7.0%<8.0%: CPT | Mod: CPTII,S$GLB,, | Performed by: INTERNAL MEDICINE

## 2024-01-25 PROCEDURE — 3066F NEPHROPATHY DOC TX: CPT | Mod: CPTII,S$GLB,, | Performed by: INTERNAL MEDICINE

## 2024-01-25 PROCEDURE — 4010F ACE/ARB THERAPY RXD/TAKEN: CPT | Mod: CPTII,S$GLB,, | Performed by: INTERNAL MEDICINE

## 2024-01-25 PROCEDURE — 1160F RVW MEDS BY RX/DR IN RCRD: CPT | Mod: CPTII,S$GLB,, | Performed by: INTERNAL MEDICINE

## 2024-01-25 PROCEDURE — 82962 GLUCOSE BLOOD TEST: CPT | Mod: ,,, | Performed by: INTERNAL MEDICINE

## 2024-01-25 PROCEDURE — 3079F DIAST BP 80-89 MM HG: CPT | Mod: CPTII,S$GLB,, | Performed by: INTERNAL MEDICINE

## 2024-01-25 PROCEDURE — 36415 COLL VENOUS BLD VENIPUNCTURE: CPT | Mod: S$GLB,,, | Performed by: INTERNAL MEDICINE

## 2024-01-25 PROCEDURE — 3008F BODY MASS INDEX DOCD: CPT | Mod: CPTII,S$GLB,, | Performed by: INTERNAL MEDICINE

## 2024-01-25 PROCEDURE — 3062F POS MACROALBUMINURIA REV: CPT | Mod: CPTII,S$GLB,, | Performed by: INTERNAL MEDICINE

## 2024-01-25 RX ORDER — DULAGLUTIDE 3 MG/.5ML
3 INJECTION, SOLUTION SUBCUTANEOUS
Qty: 4 PEN | Refills: 11 | Status: SHIPPED | OUTPATIENT
Start: 2024-01-25 | End: 2025-01-24

## 2024-01-25 NOTE — PROGRESS NOTES
Subjective:      Patient ID: Nati Buenrostro is a 58 y.o. female.    Chief Complaint: Follow-up (Pt c/o abdominal pain)    :  With diabetes with last A1c of 7.8 worsen from 7.6.  Patient is taking Lantus 44 units and NovoLog 15 units before each meals 3 times a day.  Patient has received Omnipod and will be trained on this soon.  Patient has CGM and the data is downloaded and it suggest that average blood sugar is running 259 with standard deviation of 80.  Patient blood sugars are in range 22% of the time running high 26% of the time and very high 52% of the time.       Patient has hypertension and blood pressure were under good control but seem to be running high today.  Patient is on losartan and benazepril prescribed by nephrologist for FSGS.  Patient attributes High blood pressure to multiple stressors + reports compliance with medication    Patient presented today with complains of abdominal pain x few weeks. Pain is in supra pubic area, patient reports urinary frequency, no dysuria, no hematuria, no fever, flank pain.     Patient also has history of pulmonary nodule and is being followed by MD Mora.  Patient last CT scan in January 2023 suggested to lung nodule measuring 7 mm and 5 mm.  Repeat CT is recommended in a year.  Patient will not be able to continue follow-up with MD Mora secondary to insurance issues    Patient had colonoscopy secondary to iron-deficiency anemia and that showed 2 polyps, 1 polyp was removed but the 2nd broad base polyp could not be removed and plan is to remove the polyp surgically.  Biopsy of larger polyp showed tubular adenoma.     Review of Systems   Constitutional:  Negative for chills, diaphoresis, fever, malaise/fatigue and weight loss.   HENT:  Positive for congestion. Negative for ear pain, sinus pain, sore throat and tinnitus.    Eyes:  Negative for blurred vision and photophobia.   Respiratory:  Negative for cough, hemoptysis, shortness of breath and wheezing.   "  Cardiovascular:  Negative for chest pain, palpitations, orthopnea, leg swelling and PND.   Gastrointestinal:  Positive for abdominal pain. Negative for blood in stool, constipation, diarrhea, heartburn, melena, nausea and vomiting.   Genitourinary:  Negative for dysuria, frequency and urgency.   Musculoskeletal:  Negative for back pain, myalgias and neck pain.   Skin:  Negative for rash.   Neurological:  Negative for dizziness, tremors, seizures, loss of consciousness and weakness.   Endo/Heme/Allergies:  Negative for polydipsia.   Psychiatric/Behavioral:  Negative for depression and hallucinations. The patient does not have insomnia.      Objective:   BP (!) 147/86 (BP Location: Left arm, Patient Position: Sitting, BP Method: Large (Automatic))   Pulse 65   Temp 98.5 °F (36.9 °C) (Oral)   Resp 18   Ht 5' 5" (1.651 m)   Wt 78.2 kg (172 lb 4.8 oz)   SpO2 96%   BMI 28.67 kg/m²     Physical Exam  Constitutional:       General: She is not in acute distress.     Appearance: She is not diaphoretic.   Neck:      Thyroid: No thyromegaly.   Cardiovascular:      Rate and Rhythm: Normal rate and regular rhythm.      Heart sounds: Normal heart sounds. No murmur heard.  Pulmonary:      Effort: Pulmonary effort is normal. No respiratory distress.      Breath sounds: Normal breath sounds. No wheezing.   Abdominal:      General: Bowel sounds are normal. There is no distension.      Palpations: Abdomen is soft.      Tenderness: There is abdominal tenderness (Suprapubic tenderness).   Lymphadenopathy:      Cervical: No cervical adenopathy.   Neurological:      Mental Status: She is alert and oriented to person, place, and time.   Psychiatric:         Behavior: Behavior normal.         Thought Content: Thought content normal.         Judgment: Judgment normal.       Assessment:       ICD-10-CM ICD-9-CM   1. Type 2 diabetes mellitus with diabetic polyneuropathy, with long-term current use of insulin  E11.42 250.60    Z79.4 " 357.2     V58.67   2. Solitary pulmonary nodule  R91.1 793.11   3. Suprapubic pain  R10.2 789.09   4. Hypercalcemia  E83.52 275.42       Plan:     Patient with diabetes with last A1c of 7.8 worsen from 7.6  Patient blood sugars are running very high will increase Lantus from 44--->48 units.  Will increase Trulicity from 1.3 ---> 3 mg  Advised patient to monitor blood sugars after meals and if 2 hours after meal blood sugar still running high  From 15-18 units after 2 weeks  Patient has hypertension and blood pressure today is running high.  Previously blood pressures were better controlled  Will not change Medication at this time and advised patient to monitor blood pressures at home.  Advised patient to come to clinic in a week to get blood pressures checked again  Patient has suprapubic pain probably secondary to urinary tract infection.  Will check urine analysis  Patient has hypercalcemia and reports on and off bone pain.  Will check PTH and vitamin-D levels  Advised patient to keep herself hydrated  Patient has history of solitary pulmonary nodule and was followed by MD Mora  Last CT scan in January 2023 suggested two pulmonary nodules.  Will repeat CT scan      Medication List with Changes/Refills   Current Medications    ATORVASTATIN (LIPITOR) 80 MG TABLET    Take 1 tablet (80 mg total) by mouth once daily.    BENAZEPRIL (LOTENSIN) 40 MG TABLET    Take 40 mg by mouth once daily.    BLOOD-GLUCOSE SENSOR (DEXCOM G7 SENSOR) RIDDHI    1 each by Misc.(Non-Drug; Combo Route) route every 10 days.    BUDESONIDE-FORMOTEROL 160-4.5 MCG (SYMBICORT) 160-4.5 MCG/ACTUATION HFAA    Inhale 2 puffs into the lungs 2 (two) times a day.    BUSPIRONE (BUSPAR) 10 MG TABLET    Take 10 mg by mouth every morning.    DICYCLOMINE HCL (DICYCLOMINE ORAL)    Take 20 mg by mouth daily as needed.    DULAGLUTIDE (TRULICITY) 1.5 MG/0.5 ML PEN INJECTOR    Inject 1.5 mg into the skin every 7 days.    EMPAGLIFLOZIN (JARDIANCE) 25 MG TABLET     Take 1 tablet (25 mg total) by mouth once daily.    EZETIMIBE (ZETIA) 10 MG TABLET    Take 1 tablet (10 mg total) by mouth once daily.    INSULIN ASPART U-100 (NOVOLOG) 100 UNIT/ML (3 ML) INPN PEN    Inject 12 Units into the skin 3 (three) times daily with meals.    INSULIN GLARGINE 100 UNITS/ML SUBQ PEN    Inject 42 Units into the skin 2 (two) times a day.    INSULIN PUMP CART,AUTO,BT-CNTR (OMNIPOD 5 G6 INTRO KIT, GEN 5,) CRTG    1 kit by Other route once daily.    INSULIN PUMP CART,AUTOMATED,BT (OMNIPOD 5 G6 PODS, GEN 5,) CRTG    Inject 1 each into the skin every 72 hours.    KETOCONAZOLE (NIZORAL) 2 % CREAM    Apply topically once daily.    LEVOTHYROXINE (SYNTHROID) 150 MCG TABLET    Take 1 tablet (150 mcg total) by mouth before breakfast.    LOSARTAN (COZAAR) 25 MG TABLET    Take 25 mg by mouth.    MAGNESIUM OXIDE 500 MG CAP    Take 1 tablet by mouth once daily.    MONTELUKAST (SINGULAIR) 10 MG TABLET    Take 1 tablet by mouth every evening.    OMEPRAZOLE (PRILOSEC) 20 MG CAPSULE    Take 1 capsule (20 mg total) by mouth once daily.    ONDANSETRON (ZOFRAN-ODT) 4 MG TBDL    Take 4 mg by mouth 2 (two) times daily.    SOD SULF-POT CHLORIDE-MAG SULF (SUTAB) 1.479-0.188- 0.225 GRAM TABLET    Take 12 tablets by mouth once daily. Take according to package instructions with indicated amount of water.    SUMATRIPTAN (IMITREX) 100 MG TABLET    Take 100 mg by mouth. Take one tablet as needed no more than 2 daily    TRAZODONE (DESYREL) 50 MG TABLET    Take 50 mg by mouth every evening.   Discontinued Medications    CHOLECALCIFEROL, VITAMIN D3, (VITAMIN D3) 25 MCG (1,000 UNIT) CAPSULE    Take 1 capsule by mouth once daily.

## 2024-01-29 ENCOUNTER — PATIENT MESSAGE (OUTPATIENT)
Dept: PRIMARY CARE CLINIC | Facility: CLINIC | Age: 59
End: 2024-01-29
Payer: MEDICAID

## 2024-01-29 ENCOUNTER — OFFICE VISIT (OUTPATIENT)
Dept: URGENT CARE | Facility: CLINIC | Age: 59
End: 2024-01-29
Payer: MEDICAID

## 2024-01-29 VITALS
SYSTOLIC BLOOD PRESSURE: 121 MMHG | WEIGHT: 175 LBS | DIASTOLIC BLOOD PRESSURE: 87 MMHG | RESPIRATION RATE: 18 BRPM | OXYGEN SATURATION: 95 % | HEART RATE: 66 BPM | TEMPERATURE: 98 F | HEIGHT: 65 IN | BODY MASS INDEX: 29.16 KG/M2

## 2024-01-29 DIAGNOSIS — Z20.822 EXPOSURE TO COVID-19 VIRUS: ICD-10-CM

## 2024-01-29 DIAGNOSIS — E55.9 VITAMIN D DEFICIENCY: Primary | ICD-10-CM

## 2024-01-29 DIAGNOSIS — R05.9 COUGH, UNSPECIFIED TYPE: ICD-10-CM

## 2024-01-29 DIAGNOSIS — U07.1 COVID: Primary | ICD-10-CM

## 2024-01-29 DIAGNOSIS — U07.1 COVID-19 VIRUS DETECTED: ICD-10-CM

## 2024-01-29 LAB
CTP QC/QA: YES
SARS-COV-2 AG RESP QL IA.RAPID: POSITIVE

## 2024-01-29 PROCEDURE — 87811 SARS-COV-2 COVID19 W/OPTIC: CPT | Mod: QW,S$GLB,,

## 2024-01-29 PROCEDURE — 99213 OFFICE O/P EST LOW 20 MIN: CPT | Mod: S$GLB,,,

## 2024-01-29 RX ORDER — ERGOCALCIFEROL 1.25 MG/1
50000 CAPSULE ORAL
Qty: 12 CAPSULE | Refills: 0 | Status: SHIPPED | OUTPATIENT
Start: 2024-01-29 | End: 2024-03-27

## 2024-01-29 NOTE — PATIENT INSTRUCTIONS
You have tested positive for COVID-19 today.    ISOLATION  If you tested positive and do not have symptoms, you must isolate for 5 days starting on the day of the positive test.  If you tested positive and have symptoms, you must isolate for 5 days starting on the day of the first symptoms,  not the day of the positive test.   This is the most important part, both the CDC and the [facility] emphasize that you do not test out of isolation.   After 5 days, if your symptoms have improved and you have not had fever on day 5, you can return to the community on day 6- NO TESTING REQUIRED!    In fact, we do not retest if you were positive in the last 90 days.  After your 5 days of isolation are completed, the CDC recommends strict mask use for the first 5 days that you come out of isolation.     Please follow up with your primary care provider within 2-5 days if your signs and symptoms have not resolved or worsen.         If your condition worsens or fails to improve we recommend that you receive another evaluation at the emergency room immediately or contact your primary medical clinic to discuss your concerns.   You must understand that you have received an Urgent Care treatment only and that you may be released before all of your medical problems are known or treated. You, the patient, will arrange for follow up care as instructed.

## 2024-01-29 NOTE — PROGRESS NOTES
"Subjective:      Patient ID: Nati Buenrostro is a 58 y.o. female.    Vitals:  height is 5' 5" (1.651 m) and weight is 79.4 kg (175 lb). Her temperature is 98.2 °F (36.8 °C). Her blood pressure is 121/87 and her pulse is 66. Her respiration is 18 and oxygen saturation is 95%.     Chief Complaint: Sinus Problem    Patient presents with cough and sinus pressure. Pt has been exposed to covid.     Sinus Problem  This is a new problem. The current episode started today. The problem is unchanged. There has been no fever. Her pain is at a severity of 4/10. Associated symptoms include chills, congestion, coughing, sinus pressure and a sore throat.     Constitution: Positive for chills.   HENT:  Positive for congestion, sinus pressure and sore throat.    Respiratory:  Positive for cough.     Objective:     Physical Exam   Constitutional: She is oriented to person, place, and time.   HENT:   Head: Normocephalic.   Ears:   Right Ear: Tympanic membrane, external ear and ear canal normal.   Left Ear: Tympanic membrane, external ear and ear canal normal.   Nose: Rhinorrhea and congestion present.   Mouth/Throat: Mucous membranes are moist. Posterior oropharyngeal erythema present. No oropharyngeal exudate. Oropharynx is clear.   Eyes: Conjunctivae are normal. Pupils are equal, round, and reactive to light. Extraocular movement intact   Neck: Neck supple.   Cardiovascular: Normal rate, regular rhythm, normal heart sounds and normal pulses.   Pulmonary/Chest: Effort normal and breath sounds normal.   Abdominal: Normal appearance and bowel sounds are normal. Soft.   Musculoskeletal: Normal range of motion.         General: Normal range of motion.   Neurological: She is alert and oriented to person, place, and time.   Skin: Skin is warm and dry. Capillary refill takes less than 2 seconds.   Psychiatric: Her behavior is normal. Mood, judgment and thought content normal.     Assessment:     1. Cough, unspecified type    1. Cough, " unspecified type    - SARS Coronavirus 2 Antigen, POCT Manual Read    2. COVID    - molnupiravir 200 mg capsule (EUA); Take 4 capsules (800 mg total) by mouth every 12 (twelve) hours. for 5 days  Dispense: 40 capsule; Refill: 0    3. Exposure to COVID-19 virus        Plan:     Patient Instructions   You have tested positive for COVID-19 today.    ISOLATION  If you tested positive and do not have symptoms, you must isolate for 5 days starting on the day of the positive test.  If you tested positive and have symptoms, you must isolate for 5 days starting on the day of the first symptoms,  not the day of the positive test.   This is the most important part, both the CDC and the [facility] emphasize that you do not test out of isolation.   After 5 days, if your symptoms have improved and you have not had fever on day 5, you can return to the community on day 6- NO TESTING REQUIRED!    In fact, we do not retest if you were positive in the last 90 days.  After your 5 days of isolation are completed, the CDC recommends strict mask use for the first 5 days that you come out of isolation.     Please follow up with your primary care provider within 2-5 days if your signs and symptoms have not resolved or worsen.         If your condition worsens or fails to improve we recommend that you receive another evaluation at the emergency room immediately or contact your primary medical clinic to discuss your concerns.   You must understand that you have received an Urgent Care treatment only and that you may be released before all of your medical problems are known or treated. You, the patient, will arrange for follow up care as instructed.         Cough, unspecified type  -     SARS Coronavirus 2 Antigen, POCT Manual Read          Medical Decision Making:   Urgent Care Management:  Pt presents to clinic with c/o headache, sinus congestion, sore throat.  Pt states several people in her home have tested positive for covid.  Pt tested  positive for covid in clinic today.  Pt given Molnupiravir due to being diabetic and drug interaction of paxlovid with other drugs she is taking.

## 2024-01-30 NOTE — TELEPHONE ENCOUNTER
Returned call to patient she states she went to Urgent care and tested positive for Covid and medication was ordered.

## 2024-02-05 ENCOUNTER — PATIENT MESSAGE (OUTPATIENT)
Dept: PRIMARY CARE CLINIC | Facility: CLINIC | Age: 59
End: 2024-02-05
Payer: MEDICAID

## 2024-02-05 RX ORDER — INSULIN ASPART 100 [IU]/ML
INJECTION, SOLUTION INTRAVENOUS; SUBCUTANEOUS
Qty: 10 ML | Refills: 3 | Status: SHIPPED | OUTPATIENT
Start: 2024-02-05 | End: 2024-02-06 | Stop reason: SDUPTHER

## 2024-02-06 RX ORDER — INSULIN ASPART 100 [IU]/ML
INJECTION, SOLUTION INTRAVENOUS; SUBCUTANEOUS
Qty: 70 ML | Refills: 3 | Status: SHIPPED | OUTPATIENT
Start: 2024-02-06 | End: 2024-02-15 | Stop reason: SDUPTHER

## 2024-02-06 NOTE — TELEPHONE ENCOUNTER
Returned call to patient and advised MD's schedule is full, patient is on her way to [facility].

## 2024-02-06 NOTE — TELEPHONE ENCOUNTER
"Patient sent a msg via [employer] stating she is changing her Omni pods every 48 hours and will need more insulin.  "I just picked up my novolog vial and it was 1 vail set at 60 units per day.    That is incorrect.    It takes 200 units to fill the Omni pods and I am filling them every 2 days.    This is not enough to fill 1 pod.    Nati Buenrostro"  "

## 2024-02-06 NOTE — TELEPHONE ENCOUNTER
Patient needs to be evaluated and further workup reason of neck swelling and fever.  Unfortunately my schedule is full, please see if Dr. Napier or miss Cleaning can evaluate patient.  If not please advised patient to go to urgent care

## 2024-02-06 NOTE — TELEPHONE ENCOUNTER
Returned call to patient, she reports losing her balance and falling down her stairs yesterday. C/o swelling/ pain in neck, back pain, also reports hitting the back of her head. Further states she felt as if she had a fever yesterday after the fall and is sore all over.

## 2024-02-15 DIAGNOSIS — E11.42 TYPE 2 DIABETES MELLITUS WITH DIABETIC POLYNEUROPATHY, WITH LONG-TERM CURRENT USE OF INSULIN: ICD-10-CM

## 2024-02-15 DIAGNOSIS — Z79.4 TYPE 2 DIABETES MELLITUS WITH DIABETIC POLYNEUROPATHY, WITH LONG-TERM CURRENT USE OF INSULIN: ICD-10-CM

## 2024-02-15 DIAGNOSIS — Z79.4 TYPE 2 DIABETES MELLITUS WITH DIABETIC POLYNEUROPATHY, WITH LONG-TERM CURRENT USE OF INSULIN: Primary | ICD-10-CM

## 2024-02-15 DIAGNOSIS — E11.42 TYPE 2 DIABETES MELLITUS WITH DIABETIC POLYNEUROPATHY, WITH LONG-TERM CURRENT USE OF INSULIN: Primary | ICD-10-CM

## 2024-02-15 RX ORDER — INSULIN ASPART 100 [IU]/ML
INJECTION, SOLUTION INTRAVENOUS; SUBCUTANEOUS
Qty: 70 ML | Refills: 3 | Status: SHIPPED | OUTPATIENT
Start: 2024-02-15

## 2024-02-15 RX ORDER — INSULIN PMP CART,AUT,G6/7,CNTR
1 EACH SUBCUTANEOUS
Qty: 3 EACH | Refills: 11 | Status: SHIPPED | OUTPATIENT
Start: 2024-02-15 | End: 2025-02-14

## 2024-02-15 NOTE — TELEPHONE ENCOUNTER
Refill request, patient states she is changing her omni pod's every 48hrs instead of 72 hrs. Patient states she is running out of her Novolog and will need refills

## 2024-02-16 RX ORDER — EMPAGLIFLOZIN 25 MG/1
25 TABLET, FILM COATED ORAL
Qty: 30 TABLET | Refills: 0 | Status: SHIPPED | OUTPATIENT
Start: 2024-02-16 | End: 2024-04-05

## 2024-02-27 ENCOUNTER — OFFICE VISIT (OUTPATIENT)
Dept: PRIMARY CARE CLINIC | Facility: CLINIC | Age: 59
End: 2024-02-27
Payer: MEDICAID

## 2024-02-27 VITALS
BODY MASS INDEX: 28.91 KG/M2 | WEIGHT: 173.5 LBS | HEIGHT: 65 IN | SYSTOLIC BLOOD PRESSURE: 122 MMHG | OXYGEN SATURATION: 97 % | HEART RATE: 98 BPM | DIASTOLIC BLOOD PRESSURE: 70 MMHG | TEMPERATURE: 98 F

## 2024-02-27 DIAGNOSIS — J45.901 MODERATE ASTHMA WITH EXACERBATION, UNSPECIFIED WHETHER PERSISTENT: ICD-10-CM

## 2024-02-27 DIAGNOSIS — E11.42 TYPE 2 DIABETES MELLITUS WITH DIABETIC POLYNEUROPATHY, WITH LONG-TERM CURRENT USE OF INSULIN: ICD-10-CM

## 2024-02-27 DIAGNOSIS — R05.2 SUBACUTE COUGH: ICD-10-CM

## 2024-02-27 DIAGNOSIS — Z79.4 TYPE 2 DIABETES MELLITUS WITH DIABETIC POLYNEUROPATHY, WITH LONG-TERM CURRENT USE OF INSULIN: ICD-10-CM

## 2024-02-27 DIAGNOSIS — K52.9 CHRONIC DIARRHEA: ICD-10-CM

## 2024-02-27 DIAGNOSIS — R10.13 EPIGASTRIC PAIN: Primary | ICD-10-CM

## 2024-02-27 PROCEDURE — 3008F BODY MASS INDEX DOCD: CPT | Mod: CPTII,S$GLB,, | Performed by: INTERNAL MEDICINE

## 2024-02-27 PROCEDURE — 3051F HG A1C>EQUAL 7.0%<8.0%: CPT | Mod: CPTII,S$GLB,, | Performed by: INTERNAL MEDICINE

## 2024-02-27 PROCEDURE — 99214 OFFICE O/P EST MOD 30 MIN: CPT | Mod: S$GLB,,, | Performed by: INTERNAL MEDICINE

## 2024-02-27 PROCEDURE — 95251 CONT GLUC MNTR ANALYSIS I&R: CPT | Mod: S$GLB,,, | Performed by: INTERNAL MEDICINE

## 2024-02-27 PROCEDURE — 1160F RVW MEDS BY RX/DR IN RCRD: CPT | Mod: CPTII,S$GLB,, | Performed by: INTERNAL MEDICINE

## 2024-02-27 PROCEDURE — 4010F ACE/ARB THERAPY RXD/TAKEN: CPT | Mod: CPTII,S$GLB,, | Performed by: INTERNAL MEDICINE

## 2024-02-27 PROCEDURE — 3074F SYST BP LT 130 MM HG: CPT | Mod: CPTII,S$GLB,, | Performed by: INTERNAL MEDICINE

## 2024-02-27 PROCEDURE — 3066F NEPHROPATHY DOC TX: CPT | Mod: CPTII,S$GLB,, | Performed by: INTERNAL MEDICINE

## 2024-02-27 PROCEDURE — 3078F DIAST BP <80 MM HG: CPT | Mod: CPTII,S$GLB,, | Performed by: INTERNAL MEDICINE

## 2024-02-27 PROCEDURE — 3062F POS MACROALBUMINURIA REV: CPT | Mod: CPTII,S$GLB,, | Performed by: INTERNAL MEDICINE

## 2024-02-27 PROCEDURE — 1159F MED LIST DOCD IN RCRD: CPT | Mod: CPTII,S$GLB,, | Performed by: INTERNAL MEDICINE

## 2024-02-27 RX ORDER — PANTOPRAZOLE SODIUM 40 MG/1
40 TABLET, DELAYED RELEASE ORAL DAILY
Qty: 30 TABLET | Refills: 11 | Status: SHIPPED | OUTPATIENT
Start: 2024-02-27 | End: 2025-02-26

## 2024-02-27 RX ORDER — PREDNISONE 20 MG/1
20 TABLET ORAL DAILY
Qty: 7 TABLET | Refills: 0 | Status: SHIPPED | OUTPATIENT
Start: 2024-02-27 | End: 2024-03-18

## 2024-02-27 RX ORDER — AZITHROMYCIN 500 MG/1
500 TABLET, FILM COATED ORAL DAILY
Qty: 3 TABLET | Refills: 0 | Status: SHIPPED | OUTPATIENT
Start: 2024-02-27 | End: 2024-03-18

## 2024-02-27 NOTE — PROGRESS NOTES
"  Subjective:      Patient ID: Nati Buenrostro is a 58 y.o. female.    Chief Complaint: Abdominal Pain (Hurts all over and she does have burping. ), Bloated (3 past weeks. ), Diarrhea (For the past three weeks. No OTC meds but taking her prescribed med. ), Cough ("Very dry cough. ), and Chest Pain (Started this Saturday, "it is a constant feeling of something is sitting on my chest." )    Patient with diabetes with last A1c of 7.8 suggest blood sugars are not under good control.  Patient has CGM and the data is downloaded and it suggest that patient average blood sugar is running 158 with standard deviation of 39.  Patient blood sugars are in range 74% of the time running high 22% of the time and very high 3% of the time.  Patient had a few hypoglycemic episodes as well.  Patient reports she was visiting [facility] and was part of organizing committee and was walking a lot more than usual and that might be contributing to her low blood sugars.  Patient also reports that she was exposed to secondhand smoking and every time she goes to [place] she develops asthma exacerbation, chest tightness and shortness of breath.  Tightness of chest is not associated with activity. Patient also complains of runny nose, nasal congestion, postnasal drip.  Patient also has cough that is dry nonproductive, no wheezing, shortness of breath, no fever or chills.  Patient has history of as my and is taking Symbicort 2 puffs 3 times a day with very little help.  Patient is also on Singulair.  Patient also complains of chest pain on the right side.       Patient presented today with complains of epigastric pain x 3 weeks.  Pain is intermittent, sharp, 7/10 in intensity, along with some burning with acid reflux into esophagus. Patient also complains of bloating + burping a lot.  Patient also has diarrhea x 3 weeks, patient is having a bowel movement every 30 minutes, all watery, large in amount.  Patient reports she is able to keep herself " "hydrated.  Patient also complains of nausea but no vomiting.        Review of Systems   Constitutional:  Negative for chills, diaphoresis, fever, malaise/fatigue and weight loss.   HENT:  Positive for congestion. Negative for ear pain, sinus pain, sore throat and tinnitus.    Eyes:  Negative for blurred vision and photophobia.   Respiratory:  Positive for cough. Negative for hemoptysis, shortness of breath and wheezing.    Cardiovascular:  Positive for chest pain (on the right side). Negative for palpitations, orthopnea, leg swelling and PND.   Gastrointestinal:  Positive for abdominal pain and diarrhea. Negative for blood in stool, constipation, heartburn, melena, nausea and vomiting.   Genitourinary:  Negative for dysuria, frequency and urgency.   Musculoskeletal:  Negative for back pain, myalgias and neck pain.   Skin:  Negative for rash.   Neurological:  Negative for dizziness, tremors, seizures, loss of consciousness and weakness.   Endo/Heme/Allergies:  Negative for polydipsia.   Psychiatric/Behavioral:  Negative for depression and hallucinations. The patient does not have insomnia.      Objective:   /70 (BP Location: Right arm, Patient Position: Sitting, BP Method: Medium (Automatic))   Pulse 98   Temp 97.7 °F (36.5 °C) (Oral)   Ht 5' 5" (1.651 m)   Wt 78.7 kg (173 lb 8 oz)   SpO2 97%   BMI 28.87 kg/m²     Physical Exam  Constitutional:       General: She is not in acute distress.     Appearance: She is not diaphoretic.   Neck:      Thyroid: No thyromegaly.   Cardiovascular:      Rate and Rhythm: Normal rate and regular rhythm.      Heart sounds: Normal heart sounds. No murmur heard.     Comments: No chest wall tenderness  Pulmonary:      Effort: Pulmonary effort is normal. No respiratory distress.      Breath sounds: Normal breath sounds. No wheezing.   Abdominal:      General: Bowel sounds are normal. There is no distension.      Palpations: Abdomen is soft.      Tenderness: There is no " abdominal tenderness.   Lymphadenopathy:      Cervical: No cervical adenopathy.   Neurological:      Mental Status: She is alert and oriented to person, place, and time.   Psychiatric:         Behavior: Behavior normal.         Thought Content: Thought content normal.         Judgment: Judgment normal.       Assessment:       ICD-10-CM ICD-9-CM   1. Epigastric pain  R10.13 789.06   2. Chronic diarrhea  K52.9 787.91   3. Subacute cough  R05.2 786.2   4. Type 2 diabetes mellitus with diabetic polyneuropathy, with long-term current use of insulin  E11.42 250.60    Z79.4 357.2     V58.67       Plan:     Patient with diabetes with last A1c of 7.8 is not at goal  Patient home blood sugars seem better control but still slightly high  Patient has symptoms suggestive of asthma exacerbation with persistent cough and chest tightness  Patient is given nebulizer treatment without much help  Will use prednisone 20 mg x 7 days.  Will also get chest x-ray  Patient has epigastric pain, will use PPI  Patient complains of chronic diarrhea that is lasting more than 3 weeks  Will use azithromycin  Azithromycin should help with infectious diarrhea and also with possible respiratory infection  Will get stool studies + CBC, CMP  Advised patient to go to ER if she has not able to keep herself hydrated or condition worsens  Steroid may exacerbate patient blood sugar advised patient to monitor blood sugar and bolus frequently    Medication List with Changes/Refills   New Medications    AZITHROMYCIN (ZITHROMAX) 500 MG TABLET    Take 1 tablet (500 mg total) by mouth once daily.    PANTOPRAZOLE (PROTONIX) 40 MG TABLET    Take 1 tablet (40 mg total) by mouth once daily.   Current Medications    ATORVASTATIN (LIPITOR) 80 MG TABLET    Take 1 tablet (80 mg total) by mouth once daily.    BENAZEPRIL (LOTENSIN) 40 MG TABLET    Take 40 mg by mouth once daily.    BLOOD-GLUCOSE SENSOR (DEXCOM G7 SENSOR) RIDDHI    1 each by Misc.(Non-Drug; Combo Route) route  every 10 days.    BUDESONIDE-FORMOTEROL 160-4.5 MCG (SYMBICORT) 160-4.5 MCG/ACTUATION HFAA    Inhale 2 puffs into the lungs 2 (two) times a day.    BUSPIRONE (BUSPAR) 10 MG TABLET    Take 10 mg by mouth every morning.    DICYCLOMINE HCL (DICYCLOMINE ORAL)    Take 20 mg by mouth daily as needed.    DULAGLUTIDE (TRULICITY) 3 MG/0.5 ML PEN INJECTOR    Inject 3 mg into the skin every 7 days.    ERGOCALCIFEROL (ERGOCALCIFEROL) 50,000 UNIT CAP    Take 1 capsule (50,000 Units total) by mouth every 7 days.    EZETIMIBE (ZETIA) 10 MG TABLET    Take 1 tablet (10 mg total) by mouth once daily.    INSULIN ASPART U-100 (NOVOLOG U-100 INSULIN ASPART) 100 UNIT/ML INJECTION    Use insulin according to insulin pump, 75 units/day.    INSULIN PUMP CART,AUTO,BT-CNTR (OMNIPOD 5 G6 INTRO KIT, GEN 5,) CRTG    1 kit by Other route once daily.    INSULIN PUMP CART,AUTOMATED,BT (OMNIPOD 5 G6 PODS, GEN 5,) CRTG    Inject 1 each into the skin every 48 hours.    JARDIANCE 25 MG TABLET    Take 1 tablet by mouth once daily    KETOCONAZOLE (NIZORAL) 2 % CREAM    Apply topically once daily.    LEVOTHYROXINE (SYNTHROID) 150 MCG TABLET    Take 1 tablet (150 mcg total) by mouth before breakfast.    LOSARTAN (COZAAR) 25 MG TABLET    Take 25 mg by mouth.    MAGNESIUM OXIDE 500 MG CAP    Take 1 tablet by mouth once daily.    MONTELUKAST (SINGULAIR) 10 MG TABLET    Take 1 tablet by mouth every evening.    ONDANSETRON (ZOFRAN-ODT) 4 MG TBDL    Take 4 mg by mouth 2 (two) times daily.    SOD SULF-POT CHLORIDE-MAG SULF (SUTAB) 1.479-0.188- 0.225 GRAM TABLET    Take 12 tablets by mouth once daily. Take according to package instructions with indicated amount of water.    SUMATRIPTAN (IMITREX) 100 MG TABLET    Take 100 mg by mouth. Take one tablet as needed no more than 2 daily    TRAZODONE (DESYREL) 50 MG TABLET    Take 50 mg by mouth every evening.   Discontinued Medications    INSULIN GLARGINE 100 UNITS/ML SUBQ PEN    Inject 42 Units into the skin 2 (two)  times a day.    OMEPRAZOLE (PRILOSEC) 20 MG CAPSULE    Take 1 capsule (20 mg total) by mouth once daily.

## 2024-02-29 ENCOUNTER — CLINICAL SUPPORT (OUTPATIENT)
Dept: OBSTETRICS AND GYNECOLOGY | Facility: CLINIC | Age: 59
End: 2024-02-29
Payer: MEDICAID

## 2024-02-29 DIAGNOSIS — Z01.89 ROUTINE LAB DRAW: Primary | ICD-10-CM

## 2024-02-29 PROCEDURE — 36415 COLL VENOUS BLD VENIPUNCTURE: CPT | Mod: S$GLB,,, | Performed by: INTERNAL MEDICINE

## 2024-03-01 LAB
ABS NRBC COUNT: 0 THOU/UL (ref 0–0.01)
ABSOLUTE BASOPHIL: 0 10*3/UL (ref 0–0.3)
ABSOLUTE EOSINOPHIL: 0.1 10*3/UL (ref 0–0.6)
ABSOLUTE IMMATURE GRAN: 0.01 THOU/UL (ref 0–0.03)
ABSOLUTE LYMPHOCYTE: 1.1 10*3/UL (ref 1.2–4)
ABSOLUTE MONOCYTE: 0.3 10*3/UL (ref 0.1–0.8)
ALBUMIN SERPL BCP-MCNC: 4 G/DL (ref 3.4–5)
ALP SERPL-CCNC: 84 U/L (ref 45–117)
ALT SERPL W P-5'-P-CCNC: 32 U/L (ref 13–56)
AMYLASE SERPL-CCNC: 77 U/L (ref 25–115)
ANION GAP SERPL CALC-SCNC: 6 MMOL/L (ref 3–11)
AST SERPL-CCNC: 18 U/L (ref 15–37)
BASOPHILS NFR BLD: 0.4 % (ref 0–3)
BILIRUB SERPL-MCNC: 0.3 MG/DL (ref 0.2–1)
BUN SERPL-MCNC: 21 MG/DL (ref 7–18)
BUN/CREAT SERPL: 20 RATIO
CALCIUM SERPL-MCNC: 10.7 MG/DL (ref 8.5–10.1)
CHLORIDE SERPL-SCNC: 98 MMOL/L (ref 98–107)
CO2 SERPL-SCNC: 31 MMOL/L (ref 21–32)
CREAT SERPL-MCNC: 1.05 MG/DL (ref 0.55–1.02)
EOSINOPHIL NFR BLD: 1.5 % (ref 0–6)
ERYTHROCYTE [DISTWIDTH] IN BLOOD BY AUTOMATED COUNT: 14.3 % (ref 0–15.5)
GFR ESTIMATION: > 60
GLUCOSE SERPL-MCNC: 137 MG/DL (ref 74–106)
HCT VFR BLD AUTO: 43.5 % (ref 37–47)
HGB BLD-MCNC: 13.4 G/DL (ref 12–16)
IMMATURE GRANULOCYTES: 0.2 % (ref 0–0.43)
LIPASE: 42 U/L (ref 13–75)
LYMPHOCYTES NFR BLD: 19.7 % (ref 20–45)
MCH RBC QN AUTO: 24.9 PG (ref 27–32)
MCHC RBC AUTO-ENTMCNC: 30.8 % (ref 32–36)
MCV RBC AUTO: 80.7 FL (ref 80–99)
MONOCYTES NFR BLD: 5.1 % (ref 2–10)
NEUTROPHILS # BLD AUTO: 4 10*3/UL (ref 1.4–7)
NEUTROPHILS NFR BLD: 73.1 % (ref 50–80)
NUCLEATED RED BLOOD CELLS: 0 % (ref 0–0.2)
PLATELETS: 365 10*3/UL (ref 130–400)
PMV BLD AUTO: 10.1 FL (ref 9.2–12.2)
POTASSIUM SERPL-SCNC: 4.9 MMOL/L (ref 3.5–5.1)
PROT SERPL-MCNC: 7.9 G/DL (ref 6.4–8.2)
RBC # BLD AUTO: 5.39 10*6/UL (ref 4.2–5.4)
SODIUM BLD-SCNC: 135 MMOL/L (ref 131–143)
WBC # BLD: 5.5 10*3/UL (ref 4.5–10)

## 2024-03-02 ENCOUNTER — PATIENT MESSAGE (OUTPATIENT)
Dept: PRIMARY CARE CLINIC | Facility: CLINIC | Age: 59
End: 2024-03-02
Payer: MEDICAID

## 2024-03-04 DIAGNOSIS — D50.8 HYPOCHROMIC ERYTHROCYTES: ICD-10-CM

## 2024-03-04 DIAGNOSIS — E83.52 HYPERCALCEMIA: Primary | ICD-10-CM

## 2024-03-04 NOTE — TELEPHONE ENCOUNTER
Returned call to patient and advised to get CXR done at [facility] and stool labs as well. Patient agreed and states she will go by tomorrow.

## 2024-03-06 NOTE — TELEPHONE ENCOUNTER
"Patient sent a msg via [employer], "I see that my chest X Ray is normal. What is causing this stabbing pain in my chest? It is still hurting. It hurts to breath."   "

## 2024-03-07 LAB — LACTOFERRIN, STOOL: NORMAL

## 2024-03-08 NOTE — TELEPHONE ENCOUNTER
I am not sure what might be causing these stabbing pain.  If the pain is persistent we can order CT chest (you may need an early appointment for documentation for insurance approval)

## 2024-03-15 ENCOUNTER — TELEPHONE (OUTPATIENT)
Dept: SURGERY | Facility: CLINIC | Age: 59
End: 2024-03-15
Payer: MEDICAID

## 2024-03-15 ENCOUNTER — TELEPHONE (OUTPATIENT)
Dept: PRIMARY CARE CLINIC | Facility: CLINIC | Age: 59
End: 2024-03-15
Payer: MEDICAID

## 2024-03-15 LAB
ABS NRBC COUNT: 0 THOU/UL (ref 0–0.01)
ANION GAP SERPL CALC-SCNC: 1 MMOL/L (ref 3–11)
APTT PPP: 35 SEC (ref 25.8–38.6)
BUN SERPL-MCNC: 20 MG/DL (ref 7–18)
BUN/CREAT SERPL: 24.09 RATIO
CALCIUM SERPL-MCNC: 10 MG/DL (ref 8.5–10.1)
CHLORIDE SERPL-SCNC: 106 MMOL/L (ref 98–107)
CO2 SERPL-SCNC: 30 MMOL/L (ref 21–32)
CREAT SERPL-MCNC: 0.83 MG/DL (ref 0.55–1.02)
ERYTHROCYTE [DISTWIDTH] IN BLOOD BY AUTOMATED COUNT: 14.6 % (ref 0–15.5)
GFR ESTIMATION: > 60
GLUCOSE SERPL-MCNC: 115 MG/DL (ref 74–106)
HCT VFR BLD AUTO: 43.5 % (ref 37–47)
HGB BLD-MCNC: 13.2 G/DL (ref 12–16)
INR PPP: 0.9 INR (ref 0.9–1.1)
MCH RBC QN AUTO: 24.7 PG (ref 27–32)
MCHC RBC AUTO-ENTMCNC: 30.3 % (ref 32–36)
MCV RBC AUTO: 81.3 FL (ref 80–99)
NUCLEATED RED BLOOD CELLS: 0 % (ref 0–0.2)
PLATELETS: 322 10*3/UL (ref 130–400)
PMV BLD AUTO: 9.4 FL (ref 9.2–12.2)
POTASSIUM SERPL-SCNC: 4.2 MMOL/L (ref 3.5–5.1)
PROTHROMBIN TIME: 10.7 SEC (ref 10.2–12.9)
RBC # BLD AUTO: 5.35 10*6/UL (ref 4.2–5.4)
SODIUM BLD-SCNC: 137 MMOL/L (ref 131–143)
WBC # BLD: 6.2 10*3/UL (ref 4.5–10)

## 2024-03-15 NOTE — TELEPHONE ENCOUNTER
Pt pcp was called to confirm fax was sent over. Stated that pt is having procedure on 3/19 and that I needs to be faxed over ASAP answering service was given fax number of [phone]

## 2024-03-15 NOTE — TELEPHONE ENCOUNTER
----- Message from Penny Desai sent at 3/15/2024 12:21 PM CDT -----  Contact: self  Type: Staff Message    Caller: Nati Buenrostro  Call Back Number: [phone]  Nature of the Call: pt wanting to speak with provider about her upcoming surgery.  Additional Information: na

## 2024-03-15 NOTE — TELEPHONE ENCOUNTER
Pt was called to reschedule due to pcp stating pt has to be seen for medical clearance. Also preadmitting stated pt sees a pulmonologist and a nephrologist and need clearance. Pt stated she has not seen those specific doctors in years and her pcp handles all her medical problems. Pt stated she's heading to her pcp now

## 2024-03-15 NOTE — TELEPHONE ENCOUNTER
"Patient needs sooner appt for pre op clearance.  Tried to reach [facility] for Dr. Fox, call center was not able to reach. Teams msg sent. "Hi, I received your clearance request on patient Nati Buenrostro MRN: [id number], unfortunately Dr. Cunha will not be able to clear her in time for her procedure on 03/19/2024. Per Dr. Cunha, patient would need to be seen in clinic for pre op clearance and discuss adjustments on her insulin pump since she is a diabetic plus labs or EKG needed prior to clearing patient."   "

## 2024-03-18 ENCOUNTER — OFFICE VISIT (OUTPATIENT)
Dept: PRIMARY CARE CLINIC | Facility: CLINIC | Age: 59
End: 2024-03-18
Payer: MEDICAID

## 2024-03-18 VITALS
HEIGHT: 65 IN | TEMPERATURE: 98 F | RESPIRATION RATE: 16 BRPM | WEIGHT: 172.19 LBS | SYSTOLIC BLOOD PRESSURE: 120 MMHG | OXYGEN SATURATION: 98 % | BODY MASS INDEX: 28.69 KG/M2 | HEART RATE: 75 BPM | DIASTOLIC BLOOD PRESSURE: 82 MMHG

## 2024-03-18 DIAGNOSIS — Z79.4 TYPE 2 DIABETES MELLITUS WITH DIABETIC POLYNEUROPATHY, WITH LONG-TERM CURRENT USE OF INSULIN: Primary | ICD-10-CM

## 2024-03-18 DIAGNOSIS — Z01.818 PRE-OP EVALUATION: ICD-10-CM

## 2024-03-18 DIAGNOSIS — E11.42 TYPE 2 DIABETES MELLITUS WITH DIABETIC POLYNEUROPATHY, WITH LONG-TERM CURRENT USE OF INSULIN: Primary | ICD-10-CM

## 2024-03-18 DIAGNOSIS — D12.5 ADENOMATOUS POLYP OF SIGMOID COLON: ICD-10-CM

## 2024-03-18 LAB — GLUCOSE SERPL-MCNC: 152 MG/DL (ref 70–110)

## 2024-03-18 PROCEDURE — 95251 CONT GLUC MNTR ANALYSIS I&R: CPT | Mod: S$GLB,,, | Performed by: INTERNAL MEDICINE

## 2024-03-18 PROCEDURE — 3074F SYST BP LT 130 MM HG: CPT | Mod: CPTII,S$GLB,, | Performed by: INTERNAL MEDICINE

## 2024-03-18 PROCEDURE — 4010F ACE/ARB THERAPY RXD/TAKEN: CPT | Mod: CPTII,S$GLB,, | Performed by: INTERNAL MEDICINE

## 2024-03-18 PROCEDURE — 1159F MED LIST DOCD IN RCRD: CPT | Mod: CPTII,S$GLB,, | Performed by: INTERNAL MEDICINE

## 2024-03-18 PROCEDURE — 1160F RVW MEDS BY RX/DR IN RCRD: CPT | Mod: CPTII,S$GLB,, | Performed by: INTERNAL MEDICINE

## 2024-03-18 PROCEDURE — 99214 OFFICE O/P EST MOD 30 MIN: CPT | Mod: S$GLB,,, | Performed by: INTERNAL MEDICINE

## 2024-03-18 PROCEDURE — 3066F NEPHROPATHY DOC TX: CPT | Mod: CPTII,S$GLB,, | Performed by: INTERNAL MEDICINE

## 2024-03-18 PROCEDURE — 3062F POS MACROALBUMINURIA REV: CPT | Mod: CPTII,S$GLB,, | Performed by: INTERNAL MEDICINE

## 2024-03-18 PROCEDURE — 3079F DIAST BP 80-89 MM HG: CPT | Mod: CPTII,S$GLB,, | Performed by: INTERNAL MEDICINE

## 2024-03-18 PROCEDURE — 3051F HG A1C>EQUAL 7.0%<8.0%: CPT | Mod: CPTII,S$GLB,, | Performed by: INTERNAL MEDICINE

## 2024-03-18 PROCEDURE — 82962 GLUCOSE BLOOD TEST: CPT | Mod: ,,, | Performed by: INTERNAL MEDICINE

## 2024-03-18 PROCEDURE — 3008F BODY MASS INDEX DOCD: CPT | Mod: CPTII,S$GLB,, | Performed by: INTERNAL MEDICINE

## 2024-03-18 NOTE — PROGRESS NOTES
Subjective:      Patient ID: Nati Buenrostro is a 58 y.o. female.    Chief Complaint: Pre-op Exam and Gastroesophageal Reflux (Patient states she is taking Omeprazole and Pantoprazole )    HPI:  Patient with diabetes with last A1c of 7.8 suggest blood sugars are still not at goal.  Patient has CGM and the data is downloaded and it suggest that average blood sugar is running 164 with standard deviation of 50.  Patient blood sugars are in range 66% of the time running high 28% of the time and very high 5% of the time.  Patient on last visit complained of GERD and that seem to have markedly improved after changing omeprazole to pantoprazole.    Patient had colonoscopy by Dr. Fox that showed 2 polyps 1 of them was removed but 2nd was large in size and could not be removed.  Patient was given option to remove piecemeal in the polyp out which may necessitate multiple colonoscopies versus surgical excision.  Patient opted for surgical excision.  Patient is scheduled for robotic assisted sigmoid colectomy.  Patient is here for preop evaluation.  Patient denies any chest pain, shortness on breath, ankle swelling and has been very active without any cardiac symptoms.    Review of Systems   Constitutional:  Negative for chills, diaphoresis, fever, malaise/fatigue and weight loss.   HENT:  Negative for congestion, ear pain, sinus pain, sore throat and tinnitus.    Eyes:  Negative for blurred vision and photophobia.   Respiratory:  Negative for cough, hemoptysis, shortness of breath and wheezing.    Cardiovascular:  Negative for chest pain, palpitations, orthopnea, leg swelling and PND.   Gastrointestinal:  Negative for abdominal pain, blood in stool, constipation, diarrhea, heartburn, melena, nausea and vomiting.   Genitourinary:  Negative for dysuria, frequency and urgency.   Musculoskeletal:  Negative for back pain, myalgias and neck pain.   Skin:  Negative for rash.   Neurological:  Negative for dizziness, tremors,  "seizures, loss of consciousness and weakness.   Endo/Heme/Allergies:  Negative for polydipsia.   Psychiatric/Behavioral:  Negative for depression and hallucinations. The patient does not have insomnia.      Objective:   /82 (BP Location: Left arm, Patient Position: Sitting, BP Method: Medium (Automatic))   Pulse 75   Temp 98 °F (36.7 °C) (Oral)   Resp 16   Ht 5' 5" (1.651 m)   Wt 78.1 kg (172 lb 3.2 oz)   SpO2 98%   BMI 28.66 kg/m²     Physical Exam:  Patient not examined  Assessment:       ICD-10-CM ICD-9-CM   1. Type 2 diabetes mellitus with diabetic polyneuropathy, with long-term current use of insulin  E11.42 250.60    Z79.4 357.2     V58.67   2. Adenomatous polyp of sigmoid colon  D12.5 211.3   3. Pre-op evaluation  Z01.818 V72.84       Plan:     Patient with diabetes with last A1c of 7.8 suggest blood sugars are not under good control  Patient is on insulin pump.  Advised patient to decrease basal rate to 50% on the day of surgery  If anesthesiologist is not comfortable with patient being on insulin pump, they may use sliding scale  Patient has CGM and during procedure blood sugar can be continuously monitored.  Patient is moderate risk for a moderate risk surgery.  Patient EKG, labs and chest x-ray is reviewed    Medication List with Changes/Refills   Current Medications    ATORVASTATIN (LIPITOR) 80 MG TABLET    Take 1 tablet (80 mg total) by mouth once daily.    BENAZEPRIL (LOTENSIN) 40 MG TABLET    Take 40 mg by mouth once daily.    BLOOD-GLUCOSE SENSOR (DEXCOM G7 SENSOR) RIDDHI    1 each by Misc.(Non-Drug; Combo Route) route every 10 days.    BUDESONIDE-FORMOTEROL 160-4.5 MCG (SYMBICORT) 160-4.5 MCG/ACTUATION HFAA    Inhale 2 puffs into the lungs 2 (two) times a day.    BUSPIRONE (BUSPAR) 10 MG TABLET    Take 10 mg by mouth every morning.    DICYCLOMINE HCL (DICYCLOMINE ORAL)    Take 20 mg by mouth daily as needed.    DULAGLUTIDE (TRULICITY) 3 MG/0.5 ML PEN INJECTOR    Inject 3 mg into the skin " every 7 days.    ERGOCALCIFEROL (ERGOCALCIFEROL) 50,000 UNIT CAP    Take 1 capsule (50,000 Units total) by mouth every 7 days.    EZETIMIBE (ZETIA) 10 MG TABLET    Take 1 tablet (10 mg total) by mouth once daily.    INSULIN ASPART U-100 (NOVOLOG U-100 INSULIN ASPART) 100 UNIT/ML INJECTION    Use insulin according to insulin pump, 75 units/day.    INSULIN PUMP CART,AUTO,BT-CNTR (OMNIPOD 5 G6 INTRO KIT, GEN 5,) CRTG    1 kit by Other route once daily.    INSULIN PUMP CART,AUTOMATED,BT (OMNIPOD 5 G6 PODS, GEN 5,) CRTG    Inject 1 each into the skin every 48 hours.    JARDIANCE 25 MG TABLET    Take 1 tablet by mouth once daily    KETOCONAZOLE (NIZORAL) 2 % CREAM    Apply topically once daily.    LEVOTHYROXINE (SYNTHROID) 150 MCG TABLET    Take 1 tablet (150 mcg total) by mouth before breakfast.    LOSARTAN (COZAAR) 25 MG TABLET    Take 25 mg by mouth.    MAGNESIUM OXIDE 500 MG CAP    Take 1 tablet by mouth once daily.    MONTELUKAST (SINGULAIR) 10 MG TABLET    Take 1 tablet by mouth every evening.    ONDANSETRON (ZOFRAN-ODT) 4 MG TBDL    Take 4 mg by mouth 2 (two) times daily.    PANTOPRAZOLE (PROTONIX) 40 MG TABLET    Take 1 tablet (40 mg total) by mouth once daily.    SUMATRIPTAN (IMITREX) 100 MG TABLET    Take 100 mg by mouth. Take one tablet as needed no more than 2 daily    TRAZODONE (DESYREL) 50 MG TABLET    Take 50 mg by mouth every evening.   Discontinued Medications    AZITHROMYCIN (ZITHROMAX) 500 MG TABLET    Take 1 tablet (500 mg total) by mouth once daily.    PREDNISONE (DELTASONE) 20 MG TABLET    Take 1 tablet (20 mg total) by mouth once daily.    SOD SULF-POT CHLORIDE-MAG SULF (SUTAB) 1.479-0.188- 0.225 GRAM TABLET    Take 12 tablets by mouth once daily. Take according to package instructions with indicated amount of water.

## 2024-03-19 ENCOUNTER — OUTSIDE PLACE OF SERVICE (OUTPATIENT)
Dept: SURGERY | Facility: CLINIC | Age: 59
End: 2024-03-19

## 2024-03-19 LAB — GLUCOSE SERPL-MCNC: 131 MG/DL (ref 70–105)

## 2024-03-19 PROCEDURE — 44204 LAPARO PARTIAL COLECTOMY: CPT | Mod: ,,, | Performed by: SURGERY

## 2024-03-27 ENCOUNTER — OFFICE VISIT (OUTPATIENT)
Dept: PRIMARY CARE CLINIC | Facility: CLINIC | Age: 59
End: 2024-03-27
Payer: MEDICAID

## 2024-03-27 VITALS
DIASTOLIC BLOOD PRESSURE: 64 MMHG | WEIGHT: 167 LBS | TEMPERATURE: 99 F | SYSTOLIC BLOOD PRESSURE: 100 MMHG | HEART RATE: 68 BPM | OXYGEN SATURATION: 99 % | BODY MASS INDEX: 27.82 KG/M2 | HEIGHT: 65 IN | RESPIRATION RATE: 18 BRPM

## 2024-03-27 DIAGNOSIS — E11.42 TYPE 2 DIABETES MELLITUS WITH DIABETIC POLYNEUROPATHY, WITH LONG-TERM CURRENT USE OF INSULIN: Primary | ICD-10-CM

## 2024-03-27 DIAGNOSIS — Z79.4 TYPE 2 DIABETES MELLITUS WITH DIABETIC POLYNEUROPATHY, WITH LONG-TERM CURRENT USE OF INSULIN: Primary | ICD-10-CM

## 2024-03-27 DIAGNOSIS — R50.9 FEVER, UNSPECIFIED FEVER CAUSE: ICD-10-CM

## 2024-03-27 LAB — GLUCOSE SERPL-MCNC: 180 MG/DL (ref 70–110)

## 2024-03-27 PROCEDURE — 3062F POS MACROALBUMINURIA REV: CPT | Mod: CPTII,S$GLB,, | Performed by: INTERNAL MEDICINE

## 2024-03-27 PROCEDURE — 3074F SYST BP LT 130 MM HG: CPT | Mod: CPTII,S$GLB,, | Performed by: INTERNAL MEDICINE

## 2024-03-27 PROCEDURE — 82962 GLUCOSE BLOOD TEST: CPT | Mod: ,,, | Performed by: INTERNAL MEDICINE

## 2024-03-27 PROCEDURE — 3051F HG A1C>EQUAL 7.0%<8.0%: CPT | Mod: CPTII,S$GLB,, | Performed by: INTERNAL MEDICINE

## 2024-03-27 PROCEDURE — 3008F BODY MASS INDEX DOCD: CPT | Mod: CPTII,S$GLB,, | Performed by: INTERNAL MEDICINE

## 2024-03-27 PROCEDURE — 4010F ACE/ARB THERAPY RXD/TAKEN: CPT | Mod: CPTII,S$GLB,, | Performed by: INTERNAL MEDICINE

## 2024-03-27 PROCEDURE — 3078F DIAST BP <80 MM HG: CPT | Mod: CPTII,S$GLB,, | Performed by: INTERNAL MEDICINE

## 2024-03-27 PROCEDURE — 3066F NEPHROPATHY DOC TX: CPT | Mod: CPTII,S$GLB,, | Performed by: INTERNAL MEDICINE

## 2024-03-27 PROCEDURE — 99214 OFFICE O/P EST MOD 30 MIN: CPT | Mod: 25,S$GLB,, | Performed by: INTERNAL MEDICINE

## 2024-03-27 PROCEDURE — 1159F MED LIST DOCD IN RCRD: CPT | Mod: CPTII,S$GLB,, | Performed by: INTERNAL MEDICINE

## 2024-03-27 RX ORDER — OXYCODONE AND ACETAMINOPHEN 10; 325 MG/1; MG/1
1 TABLET ORAL EVERY 8 HOURS
COMMUNITY
Start: 2024-03-22 | End: 2024-05-30

## 2024-03-27 NOTE — PROGRESS NOTES
Subjective:      Patient ID: Nati Buenrostro is a 58 y.o. female.    Chief Complaint: Diabetes (1 month f/u)    :  Patient with diabetes with last A1c of 7.8 is not at goal.  Patient has CGM and the data is downloaded and it suggest that average blood sugar is running 160 with standard deviation of 48.  Patient blood sugars are in range 71% of the time running high 23% of the time and very high 5% of the time.     Patient recently underwent colectomy secondary to a large polyp that could not be removed endoscopically.  Patient reports she tolerated the procedure well but still reports pain in excision site.  Patient also reports fever reaching 101 F. symptoms improved with Tylenol.  Patient denies any cough, wheezing, shortness on breath, dysuria, hematuria, any discharge from incision site.  Patient has some sore throat after surgery.  Last episode of fever was last night that lasted till today morning.       Review of Systems   Constitutional:  Positive for fever. Negative for chills, diaphoresis, malaise/fatigue and weight loss.   HENT:  Positive for sore throat. Negative for congestion, ear pain, sinus pain and tinnitus.    Eyes:  Negative for blurred vision and photophobia.   Respiratory:  Negative for cough, hemoptysis, shortness of breath and wheezing.    Cardiovascular:  Negative for chest pain, palpitations, orthopnea, leg swelling and PND.   Gastrointestinal:  Positive for abdominal pain (Incisional site pain). Negative for blood in stool, constipation, diarrhea, heartburn, melena, nausea and vomiting.   Genitourinary:  Negative for dysuria, frequency and urgency.   Musculoskeletal:  Negative for back pain, myalgias and neck pain.   Skin:  Negative for rash.   Neurological:  Negative for dizziness, tremors, seizures, loss of consciousness and weakness.   Endo/Heme/Allergies:  Negative for polydipsia.   Psychiatric/Behavioral:  Negative for depression and hallucinations. The patient does not have insomnia.   "    Objective:   /64 (BP Location: Left arm, Patient Position: Sitting, BP Method: Medium (Automatic))   Pulse 68   Temp 98.8 °F (37.1 °C) (Oral)   Resp 18   Ht 5' 5" (1.651 m)   Wt 75.8 kg (167 lb)   SpO2 99%   BMI 27.79 kg/m²     Physical Exam  Constitutional:       General: She is not in acute distress.     Appearance: She is not diaphoretic.   HENT:      Nose:      Comments: No sinus tenderness     Mouth/Throat:      Pharynx: No oropharyngeal exudate or posterior oropharyngeal erythema.   Neck:      Thyroid: No thyromegaly.   Cardiovascular:      Rate and Rhythm: Normal rate and regular rhythm.      Heart sounds: Normal heart sounds. No murmur heard.  Pulmonary:      Effort: Pulmonary effort is normal. No respiratory distress.      Breath sounds: Normal breath sounds. No wheezing.   Abdominal:      General: Bowel sounds are normal. There is no distension.      Palpations: Abdomen is soft.      Tenderness: There is abdominal tenderness.      Comments: Three surgical incision noted on the abdomen, all of them are healing well without any oozing or discharge   Lymphadenopathy:      Cervical: No cervical adenopathy.   Neurological:      Mental Status: She is alert and oriented to person, place, and time.   Psychiatric:         Behavior: Behavior normal.         Thought Content: Thought content normal.         Judgment: Judgment normal.       Assessment:       ICD-10-CM ICD-9-CM   1. Type 2 diabetes mellitus with diabetic polyneuropathy, with long-term current use of insulin  E11.42 250.60    Z79.4 357.2     V58.67   2. Fever, unspecified fever cause  R50.9 780.60       Plan:     Patient with diabetes with last A1c of 7.8 suggest poorly-controlled blood sugar  Patient CGM data suggest that blood sugars are much better controlled  Will Continue same dose of insulin.  Patient recently had laparoscopic collectomy.  Colon polyp was benign without any sign of malignancy  Patient has fever, no other symptoms " involving respiratory or urinary system  Will check UA, CBC and CMP      Medication List with Changes/Refills   Current Medications    ATORVASTATIN (LIPITOR) 80 MG TABLET    Take 1 tablet (80 mg total) by mouth once daily.    BENAZEPRIL (LOTENSIN) 40 MG TABLET    Take 40 mg by mouth once daily.    BLOOD-GLUCOSE SENSOR (DEXCOM G7 SENSOR) RIDDHI    1 each by Misc.(Non-Drug; Combo Route) route every 10 days.    BUDESONIDE-FORMOTEROL 160-4.5 MCG (SYMBICORT) 160-4.5 MCG/ACTUATION HFAA    Inhale 2 puffs into the lungs 2 (two) times a day.    BUSPIRONE (BUSPAR) 10 MG TABLET    Take 10 mg by mouth every morning.    DICYCLOMINE HCL (DICYCLOMINE ORAL)    Take 20 mg by mouth daily as needed.    DULAGLUTIDE (TRULICITY) 3 MG/0.5 ML PEN INJECTOR    Inject 3 mg into the skin every 7 days.    ERGOCALCIFEROL (ERGOCALCIFEROL) 50,000 UNIT CAP    Take 1 capsule (50,000 Units total) by mouth every 7 days.    EZETIMIBE (ZETIA) 10 MG TABLET    Take 1 tablet (10 mg total) by mouth once daily.    INSULIN ASPART U-100 (NOVOLOG U-100 INSULIN ASPART) 100 UNIT/ML INJECTION    Use insulin according to insulin pump, 75 units/day.    INSULIN PUMP CART,AUTO,BT-CNTR (OMNIPOD 5 G6 INTRO KIT, GEN 5,) CRTG    1 kit by Other route once daily.    INSULIN PUMP CART,AUTOMATED,BT (OMNIPOD 5 G6 PODS, GEN 5,) CRTG    Inject 1 each into the skin every 48 hours.    JARDIANCE 25 MG TABLET    Take 1 tablet by mouth once daily    KETOCONAZOLE (NIZORAL) 2 % CREAM    Apply topically once daily.    LEVOTHYROXINE (SYNTHROID) 150 MCG TABLET    Take 1 tablet (150 mcg total) by mouth before breakfast.    LOSARTAN (COZAAR) 25 MG TABLET    Take 25 mg by mouth.    MAGNESIUM OXIDE 500 MG CAP    Take 1 tablet by mouth once daily.    MONTELUKAST (SINGULAIR) 10 MG TABLET    Take 1 tablet by mouth every evening.    ONDANSETRON (ZOFRAN-ODT) 4 MG TBDL    Take 4 mg by mouth 2 (two) times daily.    OXYCODONE-ACETAMINOPHEN (PERCOCET)  MG PER TABLET    Take 1 tablet by mouth every  8 (eight) hours.    PANTOPRAZOLE (PROTONIX) 40 MG TABLET    Take 1 tablet (40 mg total) by mouth once daily.    SUMATRIPTAN (IMITREX) 100 MG TABLET    Take 100 mg by mouth. Take one tablet as needed no more than 2 daily    TRAZODONE (DESYREL) 50 MG TABLET    Take 50 mg by mouth every evening.

## 2024-03-28 ENCOUNTER — CLINICAL SUPPORT (OUTPATIENT)
Dept: OBSTETRICS AND GYNECOLOGY | Facility: CLINIC | Age: 59
End: 2024-03-28
Payer: MEDICAID

## 2024-03-28 DIAGNOSIS — Z01.89 ROUTINE LAB DRAW: Primary | ICD-10-CM

## 2024-03-28 LAB
ABS NRBC COUNT: 0 THOU/UL (ref 0–0.01)
ABSOLUTE BASOPHIL: 0 10*3/UL (ref 0–0.3)
ABSOLUTE EOSINOPHIL: 0.1 10*3/UL (ref 0–0.6)
ABSOLUTE IMMATURE GRAN: 0.01 THOU/UL (ref 0–0.03)
ABSOLUTE LYMPHOCYTE: 1.5 10*3/UL (ref 1.2–4)
ABSOLUTE MONOCYTE: 0.8 10*3/UL (ref 0.1–0.8)
ALBUMIN SERPL BCP-MCNC: 3.1 G/DL (ref 3.4–5)
ALP SERPL-CCNC: 82 U/L (ref 45–117)
ALT SERPL W P-5'-P-CCNC: 28 U/L (ref 13–56)
ANION GAP SERPL CALC-SCNC: 5 MMOL/L (ref 3–11)
APPEARANCE, UA: CLEAR
AST SERPL-CCNC: 12 U/L (ref 15–37)
BACTERIA SPEC CULT: ABNORMAL /HPF
BASOPHILS NFR BLD: 0.3 % (ref 0–3)
BILIRUB SERPL-MCNC: 0.4 MG/DL (ref 0.2–1)
BILIRUB UR QL STRIP: NEGATIVE
BUN SERPL-MCNC: 13 MG/DL (ref 7–18)
BUN/CREAT SERPL: 13.4 RATIO
CALCIUM SERPL-MCNC: 10.3 MG/DL (ref 8.5–10.1)
CHLORIDE SERPL-SCNC: 100 MMOL/L (ref 98–107)
CO2 SERPL-SCNC: 30 MMOL/L (ref 21–32)
COLOR UR: ABNORMAL
CREAT SERPL-MCNC: 0.97 MG/DL (ref 0.55–1.02)
EOSINOPHIL NFR BLD: 2.3 % (ref 0–6)
ERYTHROCYTE [DISTWIDTH] IN BLOOD BY AUTOMATED COUNT: 14.2 % (ref 0–15.5)
GFR ESTIMATION: > 60
GLUCOSE (UA): >1000 MG/DL
GLUCOSE SERPL-MCNC: 137 MG/DL (ref 74–106)
HCT VFR BLD AUTO: 36.6 % (ref 37–47)
HGB BLD-MCNC: 11.4 G/DL (ref 12–16)
HGB UR QL STRIP: NEGATIVE
IMMATURE GRANULOCYTES: 0.2 % (ref 0–0.43)
KETONES UR QL STRIP: NEGATIVE MG/DL
LEUKOCYTE ESTERASE UR QL STRIP: NEGATIVE LEU/UL
LYMPHOCYTES NFR BLD: 25.1 % (ref 20–45)
MCH RBC QN AUTO: 24.9 PG (ref 27–32)
MCHC RBC AUTO-ENTMCNC: 31.1 % (ref 32–36)
MCV RBC AUTO: 79.9 FL (ref 80–99)
MONOCYTES NFR BLD: 12.6 % (ref 2–10)
MUCUS URINE: ABNORMAL /LPF
NEUTROPHILS # BLD AUTO: 3.6 10*3/UL (ref 1.4–7)
NEUTROPHILS NFR BLD: 59.5 % (ref 50–80)
NITRITE UR QL STRIP: NEGATIVE
NUCLEATED RED BLOOD CELLS: 0 % (ref 0–0.2)
PH UR STRIP: 6 PH (ref 5–8)
PLATELETS: 445 10*3/UL (ref 130–400)
PMV BLD AUTO: 9.1 FL (ref 9.2–12.2)
POTASSIUM SERPL-SCNC: 4 MMOL/L (ref 3.5–5.1)
PROT SERPL-MCNC: 8.2 G/DL (ref 6.4–8.2)
PROT UR QL STRIP: 50 MG/DL
RBC # BLD AUTO: 4.58 10*6/UL (ref 4.2–5.4)
RBC #/AREA URNS HPF: ABNORMAL /HPF (ref 0–2)
SERVICE COMMENT 03: ABNORMAL
SODIUM BLD-SCNC: 135 MMOL/L (ref 131–143)
SP GR UR STRIP: 1.02 (ref 1–1.03)
SQUAMOUS EPITHELIAL, UA: ABNORMAL /LPF
UROBILINOGEN UR STRIP-ACNC: NORMAL MG/DL
WBC # BLD: 6.1 10*3/UL (ref 4.5–10)
WBC #/AREA URNS HPF: ABNORMAL /HPF (ref 0–2)

## 2024-03-28 PROCEDURE — 36415 COLL VENOUS BLD VENIPUNCTURE: CPT | Mod: S$GLB,,, | Performed by: INTERNAL MEDICINE

## 2024-04-01 DIAGNOSIS — D50.9 MICROCYTIC ANEMIA: Primary | ICD-10-CM

## 2024-04-02 ENCOUNTER — PATIENT MESSAGE (OUTPATIENT)
Dept: PRIMARY CARE CLINIC | Facility: CLINIC | Age: 59
End: 2024-04-02
Payer: MEDICAID

## 2024-04-02 DIAGNOSIS — T78.40XS ALLERGY, SEQUELA: ICD-10-CM

## 2024-04-02 DIAGNOSIS — G89.29 CHRONIC PAIN OF RIGHT ANKLE: Primary | ICD-10-CM

## 2024-04-02 DIAGNOSIS — M25.571 CHRONIC PAIN OF RIGHT ANKLE: Primary | ICD-10-CM

## 2024-04-02 NOTE — TELEPHONE ENCOUNTER
"Patient sent a msg via [employer], "Dr. Scott Hernandez office is asking for a referral so that they can schedule an appointment to look at my ankle.  Their fax number is [phone].     I am scheduling an appointment with an Allergist so that they can explain to me what is this allergy I have   Alpha-Gal (Galactose - Alpha -1, 3-Gala)     Thank you"  "

## 2024-04-03 ENCOUNTER — PATIENT MESSAGE (OUTPATIENT)
Dept: PRIMARY CARE CLINIC | Facility: CLINIC | Age: 59
End: 2024-04-03
Payer: MEDICAID

## 2024-04-03 NOTE — TELEPHONE ENCOUNTER
"Patient sent a msg via [employer], "Hello,     I also need a referral for the [facility] fax number [phone]"  "

## 2024-04-05 DIAGNOSIS — E11.42 TYPE 2 DIABETES MELLITUS WITH DIABETIC POLYNEUROPATHY, WITH LONG-TERM CURRENT USE OF INSULIN: ICD-10-CM

## 2024-04-05 DIAGNOSIS — Z79.4 TYPE 2 DIABETES MELLITUS WITH DIABETIC POLYNEUROPATHY, WITH LONG-TERM CURRENT USE OF INSULIN: ICD-10-CM

## 2024-04-05 RX ORDER — EMPAGLIFLOZIN 25 MG/1
25 TABLET, FILM COATED ORAL
Qty: 30 TABLET | Refills: 0 | Status: SHIPPED | OUTPATIENT
Start: 2024-04-05 | End: 2024-05-29

## 2024-04-08 ENCOUNTER — OFFICE VISIT (OUTPATIENT)
Dept: SURGERY | Facility: CLINIC | Age: 59
End: 2024-04-08
Payer: MEDICAID

## 2024-04-08 DIAGNOSIS — D12.6 TUBULAR ADENOMA OF COLON: Primary | ICD-10-CM

## 2024-04-08 PROCEDURE — 1159F MED LIST DOCD IN RCRD: CPT | Mod: CPTII,S$GLB,, | Performed by: SURGERY

## 2024-04-08 PROCEDURE — 3066F NEPHROPATHY DOC TX: CPT | Mod: CPTII,S$GLB,, | Performed by: SURGERY

## 2024-04-08 PROCEDURE — 99024 POSTOP FOLLOW-UP VISIT: CPT | Mod: S$GLB,,, | Performed by: SURGERY

## 2024-04-08 PROCEDURE — 3051F HG A1C>EQUAL 7.0%<8.0%: CPT | Mod: CPTII,S$GLB,, | Performed by: SURGERY

## 2024-04-08 PROCEDURE — 4010F ACE/ARB THERAPY RXD/TAKEN: CPT | Mod: CPTII,S$GLB,, | Performed by: SURGERY

## 2024-04-08 PROCEDURE — 3062F POS MACROALBUMINURIA REV: CPT | Mod: CPTII,S$GLB,, | Performed by: SURGERY

## 2024-04-08 NOTE — PROGRESS NOTES
58-year-old female status post robotic sigmoidectomy, patient doing well with no complaints.    Incisions healing well     Instructed patient to refrain from heavy lifting for another 4 weeks, allergy was benign, the patient needs to continue on stool softeners and a low fiber diet for the next 4 weeks and after that may go back to normal diet and activity.

## 2024-05-14 DIAGNOSIS — R91.1 LUNG NODULE: Primary | ICD-10-CM

## 2024-05-28 ENCOUNTER — PATIENT MESSAGE (OUTPATIENT)
Dept: PRIMARY CARE CLINIC | Facility: CLINIC | Age: 59
End: 2024-05-28
Payer: MEDICAID

## 2024-05-28 DIAGNOSIS — E11.42 TYPE 2 DIABETES MELLITUS WITH DIABETIC POLYNEUROPATHY, WITH LONG-TERM CURRENT USE OF INSULIN: ICD-10-CM

## 2024-05-28 DIAGNOSIS — E11.42 TYPE 2 DIABETES MELLITUS WITH DIABETIC POLYNEUROPATHY, WITH LONG-TERM CURRENT USE OF INSULIN: Primary | ICD-10-CM

## 2024-05-28 DIAGNOSIS — Z79.4 TYPE 2 DIABETES MELLITUS WITH DIABETIC POLYNEUROPATHY, WITH LONG-TERM CURRENT USE OF INSULIN: Primary | ICD-10-CM

## 2024-05-28 DIAGNOSIS — Z79.4 TYPE 2 DIABETES MELLITUS WITH DIABETIC POLYNEUROPATHY, WITH LONG-TERM CURRENT USE OF INSULIN: ICD-10-CM

## 2024-05-29 RX ORDER — EMPAGLIFLOZIN 25 MG/1
25 TABLET, FILM COATED ORAL
Qty: 30 TABLET | Refills: 0 | Status: SHIPPED | OUTPATIENT
Start: 2024-05-29

## 2024-05-29 NOTE — TELEPHONE ENCOUNTER
"Patient sent a msg via [employer] regarding clarification on Vitamin D2 refills, "Hello,     I dont have any refills for the Vitamin D2. Do I need to continue this medication?     Thanks,     Nati Buenrostro"    "

## 2024-05-30 ENCOUNTER — OFFICE VISIT (OUTPATIENT)
Dept: PRIMARY CARE CLINIC | Facility: CLINIC | Age: 59
End: 2024-05-30
Payer: MEDICAID

## 2024-05-30 VITALS
HEART RATE: 74 BPM | DIASTOLIC BLOOD PRESSURE: 75 MMHG | HEIGHT: 65 IN | WEIGHT: 171.81 LBS | RESPIRATION RATE: 18 BRPM | SYSTOLIC BLOOD PRESSURE: 129 MMHG | TEMPERATURE: 98 F | OXYGEN SATURATION: 99 % | BODY MASS INDEX: 28.62 KG/M2

## 2024-05-30 DIAGNOSIS — Z86.718 HISTORY OF DVT (DEEP VEIN THROMBOSIS): ICD-10-CM

## 2024-05-30 DIAGNOSIS — E55.9 VITAMIN D DEFICIENCY: ICD-10-CM

## 2024-05-30 DIAGNOSIS — Z79.4 TYPE 2 DIABETES MELLITUS WITH DIABETIC POLYNEUROPATHY, WITH LONG-TERM CURRENT USE OF INSULIN: Primary | ICD-10-CM

## 2024-05-30 DIAGNOSIS — E11.42 TYPE 2 DIABETES MELLITUS WITH DIABETIC POLYNEUROPATHY, WITH LONG-TERM CURRENT USE OF INSULIN: Primary | ICD-10-CM

## 2024-05-30 LAB — GLUCOSE SERPL-MCNC: 137 MG/DL (ref 70–110)

## 2024-05-30 PROCEDURE — 3066F NEPHROPATHY DOC TX: CPT | Mod: CPTII,S$GLB,, | Performed by: INTERNAL MEDICINE

## 2024-05-30 PROCEDURE — 4010F ACE/ARB THERAPY RXD/TAKEN: CPT | Mod: CPTII,S$GLB,, | Performed by: INTERNAL MEDICINE

## 2024-05-30 PROCEDURE — 3074F SYST BP LT 130 MM HG: CPT | Mod: CPTII,S$GLB,, | Performed by: INTERNAL MEDICINE

## 2024-05-30 PROCEDURE — 3008F BODY MASS INDEX DOCD: CPT | Mod: CPTII,S$GLB,, | Performed by: INTERNAL MEDICINE

## 2024-05-30 PROCEDURE — 95251 CONT GLUC MNTR ANALYSIS I&R: CPT | Mod: S$GLB,,, | Performed by: INTERNAL MEDICINE

## 2024-05-30 PROCEDURE — 1159F MED LIST DOCD IN RCRD: CPT | Mod: CPTII,S$GLB,, | Performed by: INTERNAL MEDICINE

## 2024-05-30 PROCEDURE — 3062F POS MACROALBUMINURIA REV: CPT | Mod: CPTII,S$GLB,, | Performed by: INTERNAL MEDICINE

## 2024-05-30 PROCEDURE — 82962 GLUCOSE BLOOD TEST: CPT | Mod: ,,, | Performed by: INTERNAL MEDICINE

## 2024-05-30 PROCEDURE — 1160F RVW MEDS BY RX/DR IN RCRD: CPT | Mod: CPTII,S$GLB,, | Performed by: INTERNAL MEDICINE

## 2024-05-30 PROCEDURE — 99214 OFFICE O/P EST MOD 30 MIN: CPT | Mod: 25,S$GLB,, | Performed by: INTERNAL MEDICINE

## 2024-05-30 PROCEDURE — 3078F DIAST BP <80 MM HG: CPT | Mod: CPTII,S$GLB,, | Performed by: INTERNAL MEDICINE

## 2024-05-30 PROCEDURE — 3051F HG A1C>EQUAL 7.0%<8.0%: CPT | Mod: CPTII,S$GLB,, | Performed by: INTERNAL MEDICINE

## 2024-05-30 RX ORDER — EPINEPHRINE 0.3 MG/.3ML
INJECTION SUBCUTANEOUS
COMMUNITY
Start: 2024-05-28

## 2024-05-30 RX ORDER — ALBUTEROL SULFATE 90 UG/1
AEROSOL, METERED RESPIRATORY (INHALATION)
COMMUNITY
Start: 2024-05-28

## 2024-05-30 RX ORDER — FLUTICASONE PROPIONATE 50 MCG
SPRAY, SUSPENSION (ML) NASAL
COMMUNITY
Start: 2024-05-28

## 2024-05-30 NOTE — PROGRESS NOTES
Subjective:      Patient ID: Nati Buenrostro is a 58 y.o. female.    Chief Complaint: Diabetes (2 month f/u )    :  With diabetes with last A1c of 7.8 is not at goal.  Patient is on insulin pump and also has CGM.  CGM data is downloaded and it suggest that average blood sugar is running 167 with standard deviation of 63.  Patient blood sugars are in range 65% of the time running high 23% of the time and very high 11% of the time.  1 hypoglycemic episode is noted on Saturday at about 1:00 p.m. and patient reports that she was walking a lot that day and that might be a contributing factor. Patient also has hypertension and blood pressure seem to be running high.  Home blood pressures are not being monitored.  Patient has hyperlipidemia and last LDL of 118 is not at goal.    Patient underwent robotic sigmoidectomy in April 20, 2024 after patient was found to have a polyp.  Patient reports tolerated the procedure well and denies anymore complaints.  Patient also has FSGS and is on list losartan and benazepril by nephrologist.    Review of Systems   Constitutional:  Negative for chills, diaphoresis, fever, malaise/fatigue and weight loss (4 lb weight gain).   HENT:  Negative for congestion, ear pain, sinus pain, sore throat and tinnitus.    Eyes:  Negative for blurred vision and photophobia.   Respiratory:  Negative for cough, hemoptysis, shortness of breath and wheezing.    Cardiovascular:  Negative for chest pain, palpitations, orthopnea, leg swelling and PND.   Gastrointestinal:  Negative for abdominal pain, blood in stool, constipation, diarrhea, heartburn, melena, nausea and vomiting.   Genitourinary:  Negative for dysuria, frequency and urgency.   Musculoskeletal:  Negative for back pain, myalgias and neck pain.   Skin:  Negative for rash.   Neurological:  Negative for dizziness, tremors, seizures, loss of consciousness and weakness.   Endo/Heme/Allergies:  Negative for polydipsia.   Psychiatric/Behavioral:   "Negative for depression and hallucinations. The patient does not have insomnia.      Objective:   /75 (BP Location: Right arm, Patient Position: Sitting, BP Method: Medium (Automatic))   Pulse 74   Temp 97.7 °F (36.5 °C) (Oral)   Resp 18   Ht 5' 5" (1.651 m)   Wt 77.9 kg (171 lb 12.8 oz)   SpO2 99%   BMI 28.59 kg/m²     Physical Exam:  Patient not examined  Assessment:       ICD-10-CM ICD-9-CM   1. Type 2 diabetes mellitus with diabetic polyneuropathy, with long-term current use of insulin  E11.42 250.60    Z79.4 357.2     V58.67   2. History of DVT (deep vein thrombosis)  Z86.718 V12.51   3. Vitamin D deficiency  E55.9 268.9       Plan:     Patient with diabetes with last A1c of 7.8 is not at goal.  Patient is on CGM  CGM data suggest that patient home blood sugars are much better controlled  Will repeat A1c and CMP  Patient has previous history of vitamin-D deficiency.  Will repeat vitamin-D levels  Patient has history of DVT s/p anticoagulation x 6 months.  Patient has FSGS and is being followed by nephrologist in [place]  Advised patient to keep appointment  Patient blood pressure seem to be running high.  Will repeat blood pressure  Repeat blood pressures are better controlled  Patient has lung nodule and is being followed by MD Mora  Patient is referred to Dr. Abreu for further evaluation  Advised patient to keep appointment  Medication List with Changes/Refills   Current Medications    ALBUTEROL (PROVENTIL/VENTOLIN HFA) 90 MCG/ACTUATION INHALER        ATORVASTATIN (LIPITOR) 80 MG TABLET    Take 1 tablet (80 mg total) by mouth once daily.    BENAZEPRIL (LOTENSIN) 40 MG TABLET    Take 40 mg by mouth once daily.    BLOOD-GLUCOSE SENSOR (DEXCOM G7 SENSOR) RIDDHI    1 each by Misc.(Non-Drug; Combo Route) route every 10 days.    BUDESONIDE-FORMOTEROL 160-4.5 MCG (SYMBICORT) 160-4.5 MCG/ACTUATION HFAA    Inhale 2 puffs into the lungs 2 (two) times a day.    BUSPIRONE (BUSPAR) 10 MG TABLET    Take 10 " mg by mouth every morning.    DICYCLOMINE HCL (DICYCLOMINE ORAL)    Take 20 mg by mouth daily as needed.    DULAGLUTIDE (TRULICITY) 3 MG/0.5 ML PEN INJECTOR    Inject 3 mg into the skin every 7 days.    EPINEPHRINE (EPIPEN) 0.3 MG/0.3 ML ATIN        EZETIMIBE (ZETIA) 10 MG TABLET    Take 1 tablet (10 mg total) by mouth once daily.    FLUTICASONE PROPIONATE (FLONASE) 50 MCG/ACTUATION NASAL SPRAY        INSULIN ASPART U-100 (NOVOLOG U-100 INSULIN ASPART) 100 UNIT/ML INJECTION    Use insulin according to insulin pump, 75 units/day.    INSULIN PUMP CART,AUTO,BT-CNTR (OMNIPOD 5 G6 INTRO KIT, GEN 5,) CRTG    1 kit by Other route once daily.    INSULIN PUMP CART,AUTOMATED,BT (OMNIPOD 5 G6 PODS, GEN 5,) CRTG    Inject 1 each into the skin every 48 hours.    JARDIANCE 25 MG TABLET    Take 1 tablet by mouth once daily    KETOCONAZOLE (NIZORAL) 2 % CREAM    Apply topically once daily.    LEVOTHYROXINE (SYNTHROID) 150 MCG TABLET    Take 1 tablet (150 mcg total) by mouth before breakfast.    LOSARTAN (COZAAR) 25 MG TABLET    Take 25 mg by mouth.    MAGNESIUM OXIDE 500 MG CAP    Take 1 tablet by mouth once daily.    MONTELUKAST (SINGULAIR) 10 MG TABLET    Take 1 tablet by mouth every evening.    ONDANSETRON (ZOFRAN-ODT) 4 MG TBDL    Take 4 mg by mouth 2 (two) times daily.    PANTOPRAZOLE (PROTONIX) 40 MG TABLET    Take 1 tablet (40 mg total) by mouth once daily.   Discontinued Medications    OXYCODONE-ACETAMINOPHEN (PERCOCET)  MG PER TABLET    Take 1 tablet by mouth every 8 (eight) hours.    TRAZODONE (DESYREL) 50 MG TABLET    Take 50 mg by mouth every evening.

## 2024-05-31 DIAGNOSIS — R91.1 LUNG NODULE: Primary | ICD-10-CM

## 2024-06-03 RX ORDER — GLUCAGON 3 MG/1
1 POWDER NASAL DAILY PRN
Qty: 1 EACH | Refills: 1 | Status: SHIPPED | OUTPATIENT
Start: 2024-06-03

## 2024-06-03 NOTE — TELEPHONE ENCOUNTER
"Patient requested script for low blood sugar and states, "Hello      The nose spray for my low sugar was not called in to the [facility].       Could you please call it in. This is from my visit on Thursday.      Thank you"  "

## 2024-06-07 ENCOUNTER — TELEPHONE (OUTPATIENT)
Dept: PULMONOLOGY | Facility: CLINIC | Age: 59
End: 2024-06-07
Payer: MEDICAID

## 2024-06-07 NOTE — TELEPHONE ENCOUNTER
Spoke to patient and advised that CT authorization  on 24, request input for extension as patient unable to get completed prior to expiration date.  Insurance has requested a peer to peer, and Kailyn Nuñez RN will complete this after lunch.  Hospital is aware of the pending status of the authorization and agreed to keep CT as scheduled pending authorization update.  Patient is scheduled for 6/10/24 @ 9:30 am and has been made aware that should the authorization not be finalized prior to the scheduled CT, that they would have to reschedule.  Patient advised she would be contacted by our clinic on Monday by 8:30am with an update on the authorization status.  Patient expressed understanding.    LO 24 @11:59am

## 2024-06-07 NOTE — TELEPHONE ENCOUNTER
Patient called frustrated because her CT scan is scheduled for Monday, but her insurance has now denied the procedure.  Patient was told by her providers at [facility] that we would have no issues getting this approved and scheduled as we were an [facility] facility.  Patient was advised that the [facility] is not a designated J[facility] at this time and expressed understanding.  I have reached out to Radiology Pre-Service team to get further details on the denial for the procedure and promised the patient I would contact her back with my findings.      GOLDEN 6/7/24 @10:08am

## 2024-06-18 ENCOUNTER — TELEPHONE (OUTPATIENT)
Dept: PRIMARY CARE CLINIC | Facility: CLINIC | Age: 59
End: 2024-06-18
Payer: MEDICAID

## 2024-06-18 NOTE — TELEPHONE ENCOUNTER
PA HAS BEEN COMPLETED  THROUGH COVER MY MEDS FOR HER PANTOPRAZOLE AND SENT TO HER INS FOR APPROVAL

## 2024-06-20 ENCOUNTER — TELEPHONE (OUTPATIENT)
Dept: PRIMARY CARE CLINIC | Facility: CLINIC | Age: 59
End: 2024-06-20
Payer: MEDICAID

## 2024-06-20 NOTE — TELEPHONE ENCOUNTER
Pt informed that her ins-Medicaid--denied her request for Pantoprazole-- ins only allow 180 days of med out of a 365 year --pt informed that she can still get med but would need to pay out of pocket--coupon sent to her pharmacy

## 2024-07-10 ENCOUNTER — PATIENT MESSAGE (OUTPATIENT)
Dept: PRIMARY CARE CLINIC | Facility: CLINIC | Age: 59
End: 2024-07-10
Payer: MEDICAID

## 2024-07-10 DIAGNOSIS — E11.42 TYPE 2 DIABETES MELLITUS WITH DIABETIC POLYNEUROPATHY, WITH LONG-TERM CURRENT USE OF INSULIN: ICD-10-CM

## 2024-07-10 DIAGNOSIS — Z79.4 TYPE 2 DIABETES MELLITUS WITH DIABETIC POLYNEUROPATHY, WITH LONG-TERM CURRENT USE OF INSULIN: ICD-10-CM

## 2024-07-10 RX ORDER — EMPAGLIFLOZIN 25 MG/1
25 TABLET, FILM COATED ORAL
Qty: 30 TABLET | Refills: 0 | Status: SHIPPED | OUTPATIENT
Start: 2024-07-10

## 2024-07-11 LAB
LEFT EYE DM RETINOPATHY: NEGATIVE
RIGHT EYE DM RETINOPATHY: NEGATIVE

## 2024-07-17 DIAGNOSIS — Z79.4 TYPE 2 DIABETES MELLITUS WITH DIABETIC POLYNEUROPATHY, WITH LONG-TERM CURRENT USE OF INSULIN: ICD-10-CM

## 2024-07-17 DIAGNOSIS — E11.42 TYPE 2 DIABETES MELLITUS WITH DIABETIC POLYNEUROPATHY, WITH LONG-TERM CURRENT USE OF INSULIN: ICD-10-CM

## 2024-07-17 RX ORDER — EMPAGLIFLOZIN 25 MG/1
25 TABLET, FILM COATED ORAL
Qty: 30 TABLET | Refills: 0 | Status: SHIPPED | OUTPATIENT
Start: 2024-07-17

## 2024-07-19 ENCOUNTER — CLINICAL SUPPORT (OUTPATIENT)
Dept: OBSTETRICS AND GYNECOLOGY | Facility: CLINIC | Age: 59
End: 2024-07-19
Payer: MEDICAID

## 2024-07-19 DIAGNOSIS — Z01.89 ROUTINE LAB DRAW: Primary | ICD-10-CM

## 2024-07-19 LAB
% SATURATION: 16 % (ref 20–50)
25(OH)D3 SERPL-MCNC: 20 NG/ML
ABS NRBC COUNT: 0 THOU/UL (ref 0–0.01)
ABSOLUTE BASOPHIL: 0 10*3/UL (ref 0–0.3)
ABSOLUTE EOSINOPHIL: 0.1 10*3/UL (ref 0–0.6)
ABSOLUTE IMMATURE GRAN: 0.01 THOU/UL (ref 0–0.03)
ABSOLUTE LYMPHOCYTE: 1.5 10*3/UL (ref 1.2–4)
ABSOLUTE MONOCYTE: 0.5 10*3/UL (ref 0.1–0.8)
ALBUMIN SERPL BCP-MCNC: 3.9 G/DL (ref 3.4–5)
ALP SERPL-CCNC: 106 U/L (ref 45–117)
ALT SERPL W P-5'-P-CCNC: 32 U/L (ref 13–56)
ANION GAP SERPL CALC-SCNC: 9 MMOL/L (ref 3–11)
AST SERPL-CCNC: 15 U/L (ref 15–37)
BASOPHILS NFR BLD: 0.7 % (ref 0–3)
BILIRUB SERPL-MCNC: 0.3 MG/DL (ref 0.2–1)
BUN SERPL-MCNC: 21 MG/DL (ref 7–18)
BUN/CREAT SERPL: 20 RATIO
CALCIUM SERPL-MCNC: 10 MG/DL (ref 8.5–10.1)
CHLORIDE SERPL-SCNC: 101 MMOL/L (ref 98–107)
CO2 SERPL-SCNC: 30 MMOL/L (ref 21–32)
CREAT SERPL-MCNC: 1.05 MG/DL (ref 0.55–1.02)
EOSINOPHIL NFR BLD: 1.2 % (ref 0–6)
ERYTHROCYTE [DISTWIDTH] IN BLOOD BY AUTOMATED COUNT: 13.7 % (ref 0–15.5)
ESTIMATED AVG GLUCOSE: 158 MG/DL
FERRITIN SERPL-MCNC: 93.4 NG/ML (ref 8–252)
GFR ESTIMATION: 54
GLUCOSE SERPL-MCNC: 139 MG/DL (ref 74–106)
HBA1C MFR BLD: 6.6 % (ref 4.2–6.3)
HCT VFR BLD AUTO: 43.9 % (ref 37–47)
HGB BLD-MCNC: 13.6 G/DL (ref 12–16)
IMMATURE GRANULOCYTES: 0.2 % (ref 0–0.43)
IRON: 55 UG/DL (ref 50–170)
LYMPHOCYTES NFR BLD: 24.9 % (ref 20–45)
MCH RBC QN AUTO: 25.9 PG (ref 27–32)
MCHC RBC AUTO-ENTMCNC: 31 % (ref 32–36)
MCV RBC AUTO: 83.6 FL (ref 80–99)
MONOCYTES NFR BLD: 8.5 % (ref 2–10)
NEUTROPHILS # BLD AUTO: 3.8 10*3/UL (ref 1.4–7)
NEUTROPHILS NFR BLD: 64.5 % (ref 50–80)
NUCLEATED RED BLOOD CELLS: 0 % (ref 0–0.2)
PLATELETS: 310 10*3/UL (ref 130–400)
PMV BLD AUTO: 10.1 FL (ref 9.2–12.2)
POTASSIUM SERPL-SCNC: 4.2 MMOL/L (ref 3.5–5.1)
PROT SERPL-MCNC: 7.7 G/DL (ref 6.4–8.2)
RBC # BLD AUTO: 5.25 10*6/UL (ref 4.2–5.4)
SODIUM BLD-SCNC: 140 MMOL/L (ref 131–143)
TOTAL IRON BINDING CAPACITY: 348 UG/DL (ref 250–450)
WBC # BLD: 5.9 10*3/UL (ref 4.5–10)

## 2024-07-19 PROCEDURE — 36415 COLL VENOUS BLD VENIPUNCTURE: CPT | Mod: S$GLB,,, | Performed by: INTERNAL MEDICINE

## 2024-07-24 DIAGNOSIS — R91.1 LUNG NODULE: Primary | ICD-10-CM

## 2024-07-25 DIAGNOSIS — Z79.4 TYPE 2 DIABETES MELLITUS WITH DIABETIC POLYNEUROPATHY, WITH LONG-TERM CURRENT USE OF INSULIN: ICD-10-CM

## 2024-07-25 DIAGNOSIS — E11.42 TYPE 2 DIABETES MELLITUS WITH DIABETIC POLYNEUROPATHY, WITH LONG-TERM CURRENT USE OF INSULIN: ICD-10-CM

## 2024-07-25 RX ORDER — DULAGLUTIDE 3 MG/.5ML
INJECTION, SOLUTION SUBCUTANEOUS
Qty: 4 PEN | Refills: 1 | Status: SHIPPED | OUTPATIENT
Start: 2024-07-25

## 2024-07-29 DIAGNOSIS — E55.9 VITAMIN D DEFICIENCY: Primary | ICD-10-CM

## 2024-07-29 DIAGNOSIS — N28.9 FUNCTION KIDNEY DECREASED: Primary | ICD-10-CM

## 2024-07-29 RX ORDER — ERGOCALCIFEROL 1.25 MG/1
50000 CAPSULE ORAL
Qty: 12 CAPSULE | Refills: 0 | Status: SHIPPED | OUTPATIENT
Start: 2024-07-29

## 2024-08-02 ENCOUNTER — OUTSIDE PLACE OF SERVICE (OUTPATIENT)
Dept: PULMONOLOGY | Facility: CLINIC | Age: 59
End: 2024-08-02
Payer: MEDICAID

## 2024-08-07 ENCOUNTER — OFFICE VISIT (OUTPATIENT)
Dept: PULMONOLOGY | Facility: CLINIC | Age: 59
End: 2024-08-07
Payer: MEDICAID

## 2024-08-07 VITALS
DIASTOLIC BLOOD PRESSURE: 82 MMHG | RESPIRATION RATE: 17 BRPM | SYSTOLIC BLOOD PRESSURE: 142 MMHG | HEART RATE: 85 BPM | HEIGHT: 65 IN | WEIGHT: 176.81 LBS | BODY MASS INDEX: 29.46 KG/M2

## 2024-08-07 DIAGNOSIS — R91.1 LUNG NODULE: Primary | ICD-10-CM

## 2024-08-07 DIAGNOSIS — R91.1 SOLITARY PULMONARY NODULE: ICD-10-CM

## 2024-08-07 DIAGNOSIS — J45.20 MILD INTERMITTENT ASTHMA, UNSPECIFIED WHETHER COMPLICATED: ICD-10-CM

## 2024-08-07 PROCEDURE — 1160F RVW MEDS BY RX/DR IN RCRD: CPT | Mod: CPTII,,, | Performed by: INTERNAL MEDICINE

## 2024-08-07 PROCEDURE — 4010F ACE/ARB THERAPY RXD/TAKEN: CPT | Mod: CPTII,,, | Performed by: INTERNAL MEDICINE

## 2024-08-07 PROCEDURE — 3077F SYST BP >= 140 MM HG: CPT | Mod: CPTII,,, | Performed by: INTERNAL MEDICINE

## 2024-08-07 PROCEDURE — 1159F MED LIST DOCD IN RCRD: CPT | Mod: CPTII,,, | Performed by: INTERNAL MEDICINE

## 2024-08-07 PROCEDURE — 3079F DIAST BP 80-89 MM HG: CPT | Mod: CPTII,,, | Performed by: INTERNAL MEDICINE

## 2024-08-07 PROCEDURE — 3044F HG A1C LEVEL LT 7.0%: CPT | Mod: CPTII,,, | Performed by: INTERNAL MEDICINE

## 2024-08-07 PROCEDURE — 3066F NEPHROPATHY DOC TX: CPT | Mod: CPTII,,, | Performed by: INTERNAL MEDICINE

## 2024-08-07 PROCEDURE — 3008F BODY MASS INDEX DOCD: CPT | Mod: CPTII,,, | Performed by: INTERNAL MEDICINE

## 2024-08-07 PROCEDURE — 99205 OFFICE O/P NEW HI 60 MIN: CPT | Mod: ,,, | Performed by: INTERNAL MEDICINE

## 2024-08-07 PROCEDURE — 3062F POS MACROALBUMINURIA REV: CPT | Mod: CPTII,,, | Performed by: INTERNAL MEDICINE

## 2024-08-12 ENCOUNTER — PATIENT OUTREACH (OUTPATIENT)
Dept: ADMINISTRATIVE | Facility: HOSPITAL | Age: 59
End: 2024-08-12
Payer: MEDICAID

## 2024-08-16 ENCOUNTER — OFFICE VISIT (OUTPATIENT)
Dept: PRIMARY CARE CLINIC | Facility: CLINIC | Age: 59
End: 2024-08-16
Payer: MEDICAID

## 2024-08-16 VITALS
HEART RATE: 73 BPM | RESPIRATION RATE: 16 BRPM | BODY MASS INDEX: 29.16 KG/M2 | OXYGEN SATURATION: 98 % | WEIGHT: 175 LBS | DIASTOLIC BLOOD PRESSURE: 84 MMHG | SYSTOLIC BLOOD PRESSURE: 135 MMHG | HEIGHT: 65 IN | TEMPERATURE: 98 F

## 2024-08-16 DIAGNOSIS — M54.50 CHRONIC MIDLINE LOW BACK PAIN WITHOUT SCIATICA: ICD-10-CM

## 2024-08-16 DIAGNOSIS — M25.512 CHRONIC LEFT SHOULDER PAIN: ICD-10-CM

## 2024-08-16 DIAGNOSIS — Z79.4 TYPE 2 DIABETES MELLITUS WITH DIABETIC POLYNEUROPATHY, WITH LONG-TERM CURRENT USE OF INSULIN: Primary | ICD-10-CM

## 2024-08-16 DIAGNOSIS — G89.29 CHRONIC MIDLINE LOW BACK PAIN WITHOUT SCIATICA: ICD-10-CM

## 2024-08-16 DIAGNOSIS — E11.42 TYPE 2 DIABETES MELLITUS WITH DIABETIC POLYNEUROPATHY, WITH LONG-TERM CURRENT USE OF INSULIN: Primary | ICD-10-CM

## 2024-08-16 DIAGNOSIS — G89.29 CHRONIC LEFT SHOULDER PAIN: ICD-10-CM

## 2024-08-16 LAB — GLUCOSE SERPL-MCNC: 133 MG/DL (ref 70–110)

## 2024-08-16 NOTE — PROGRESS NOTES
Subjective:      Patient ID: Nati Buenrostro is a 58 y.o. female.    Chief Complaint: Follow-up (ER follow up [facility]  , dizziness , light headed ,acid reflux , vomiting , 3 days prior ) and Rash (Left lower extremities red latches  )    HPI:  Patient with diabetes with last A1c of 6.6 is at goal.  Patient is on CGM and the data is downloaded that suggest that average blood sugar is running 201 with standard deviation of 71.  Patient blood sugars are in range 47% of the time running high 30% of the time and very high 23% of the time.  Few hypoglycemic episodes are noted.  Most of the hypoglycemic episodes are after bolusing for high blood sugar.     Patient went to ER yesterday secondary to nausea, vomiting, GERD, dizziness, wheezing and some cough.  Patient's symptoms started after using vitamin-D that had gelatin and patient reports she was recently evaluated by allergy immunology and was told to be allergic to gelatin, magnesium citrate, glycerin, bovine extract.  Patient still reports having some itching all over the body, feeling head pressure.     Patient also complains of low back pain x months.  Pain is constant, sharp, no radiation of pain down the legs, patient also has stiffness of muscle in the upper back.  Patient reports restriction of range of motion left shoulder,.  Patient is having difficulty raising it above shoulder level.  Patient reports having a fall when she slipped in her house and fell on the left side.     Review of Systems   Constitutional:  Negative for chills, diaphoresis, fever, malaise/fatigue and weight loss.   HENT:  Positive for congestion. Negative for ear pain, sinus pain, sore throat and tinnitus.    Eyes:  Negative for blurred vision and photophobia.   Respiratory:  Negative for cough, hemoptysis, shortness of breath and wheezing.    Cardiovascular:  Negative for chest pain, palpitations, orthopnea, leg swelling and PND.   Gastrointestinal:  Negative for abdominal pain, blood in  "stool, constipation, diarrhea, heartburn, melena, nausea and vomiting.   Genitourinary:  Negative for dysuria, frequency and urgency.   Musculoskeletal:  Positive for back pain and joint pain. Negative for myalgias and neck pain.   Skin:  Negative for rash.   Neurological:  Negative for dizziness, tremors, seizures, loss of consciousness and weakness.   Endo/Heme/Allergies:  Negative for polydipsia.   Psychiatric/Behavioral:  Negative for depression and hallucinations. The patient does not have insomnia.      Objective:   /84 (BP Location: Right arm, Patient Position: Sitting, BP Method: Large (Automatic))   Pulse 73   Temp 97.9 °F (36.6 °C) (Oral)   Resp 16   Ht 5' 5" (1.651 m)   Wt 79.4 kg (175 lb)   SpO2 98%   BMI 29.12 kg/m²     Physical Exam  Constitutional:       General: She is not in acute distress.     Appearance: She is not diaphoretic.   Neck:      Thyroid: No thyromegaly.   Cardiovascular:      Rate and Rhythm: Normal rate and regular rhythm.      Heart sounds: Normal heart sounds. No murmur heard.  Pulmonary:      Effort: Pulmonary effort is normal. No respiratory distress.      Breath sounds: Normal breath sounds. No wheezing.   Abdominal:      General: Bowel sounds are normal. There is no distension.      Palpations: Abdomen is soft.      Tenderness: There is no abdominal tenderness.   Musculoskeletal:      Comments: No lumbar tenderness in this time.  Bilateral leg strength is decreased 3/5  Left shoulder is nontender to palpation but has diffuse restriction of range of motion.     Lymphadenopathy:      Cervical: No cervical adenopathy.   Neurological:      Mental Status: She is alert and oriented to person, place, and time.   Psychiatric:         Behavior: Behavior normal.         Thought Content: Thought content normal.         Judgment: Judgment normal.      Comments: Patient is depressed and starts crying during interview.  Patient lost her partner for 32 years.       Assessment: "       ICD-10-CM ICD-9-CM   1. Type 2 diabetes mellitus with diabetic polyneuropathy, with long-term current use of insulin  E11.42 250.60    Z79.4 357.2     V58.67   2. Chronic left shoulder pain  M25.512 719.41    G89.29 338.29   3. Chronic midline low back pain without sciatica  M54.50 724.2    G89.29 338.29       Plan:     Patient with diabetes with last A1c of 6.6 is at goal.  Patient CGM data suggest few hypoglycemic episodes after bolus.  Will increase insulin sensitivity from 18---> 20  Patient is using 80% of insulin as basal and only 20% as bolus  Advised patient to bolus before meals + bolus if the blood sugars are running high  Patient will get next generation insulin pump soon that will be closed-loop and automated system may be used  Patient blood pressure slightly high today..  Will not change medication  Patient has some itching and hydroxyzine was recommended but that comes in capsule  Patient is allergic to gelatin will avoid capsule  Advised patient to use Benadryl for itching  Patient is being grieving the loss of her partner.  Patient reports she is handling it well and does not need any medication at this time.  Patient has lung nodule and is being followed by pulmonologist.  Patient has left shoulder pain and low back pain..  Will get x-ray  Will refer to physical therapy.  Patient has some weakness in bilateral legs on examination  But patient has no weakness while walking + no tingling and numbness is reported.  Will order MRI after 6 week of physical therapy.     Medication List with Changes/Refills   Current Medications    ALBUTEROL (PROVENTIL/VENTOLIN HFA) 90 MCG/ACTUATION INHALER        ATORVASTATIN (LIPITOR) 80 MG TABLET    Take 1 tablet (80 mg total) by mouth once daily.    BENAZEPRIL (LOTENSIN) 40 MG TABLET    Take 40 mg by mouth once daily.    BLOOD-GLUCOSE SENSOR (DEXCOM G7 SENSOR) RIDDHI    1 each by Misc.(Non-Drug; Combo Route) route every 10 days.    BUDESONIDE-FORMOTEROL 160-4.5  MCG (SYMBICORT) 160-4.5 MCG/ACTUATION HFAA    Inhale 2 puffs into the lungs 2 (two) times a day.    BUSPIRONE (BUSPAR) 10 MG TABLET    Take 10 mg by mouth every morning.    DICYCLOMINE HCL (DICYCLOMINE ORAL)    Take 20 mg by mouth daily as needed.    DULAGLUTIDE (TRULICITY) 3 MG/0.5 ML PEN INJECTOR    INJECT 3 MG SUBCUTANEOUSLY ONCE A WEEK    EPINEPHRINE (EPIPEN) 0.3 MG/0.3 ML ATIN        ERGOCALCIFEROL (ERGOCALCIFEROL) 50,000 UNIT CAP    Take 1 capsule (50,000 Units total) by mouth every 7 days.    EZETIMIBE (ZETIA) 10 MG TABLET    Take 1 tablet (10 mg total) by mouth once daily.    FLUTICASONE PROPIONATE (FLONASE) 50 MCG/ACTUATION NASAL SPRAY        GLUCAGON (BAQSIMI) 3 MG/ACTUATION SPRY    1 spray by Nasal route daily as needed (hypoglycemia).    INSULIN ASPART U-100 (NOVOLOG U-100 INSULIN ASPART) 100 UNIT/ML INJECTION    Use insulin according to insulin pump, 75 units/day.    INSULIN PUMP CART,AUTO,BT-CNTR (OMNIPOD 5 G6 INTRO KIT, GEN 5,) CRTG    1 kit by Other route once daily.    INSULIN PUMP CART,AUTOMATED,BT (OMNIPOD 5 G6 PODS, GEN 5,) CRTG    Inject 1 each into the skin every 48 hours.    JARDIANCE 25 MG TABLET    Take 1 tablet by mouth once daily    KETOCONAZOLE (NIZORAL) 2 % CREAM    Apply topically once daily.    LEVOTHYROXINE (SYNTHROID) 150 MCG TABLET    Take 1 tablet (150 mcg total) by mouth before breakfast.    LOSARTAN (COZAAR) 25 MG TABLET    Take 25 mg by mouth.    MAGNESIUM OXIDE 500 MG CAP    Take 1 tablet by mouth once daily.    MONTELUKAST (SINGULAIR) 10 MG TABLET    Take 1 tablet by mouth every evening.    ONDANSETRON (ZOFRAN-ODT) 4 MG TBDL    Take 4 mg by mouth 2 (two) times daily.    PANTOPRAZOLE (PROTONIX) 40 MG TABLET    Take 1 tablet (40 mg total) by mouth once daily.

## 2024-08-21 ENCOUNTER — PATIENT MESSAGE (OUTPATIENT)
Dept: PRIMARY CARE CLINIC | Facility: CLINIC | Age: 59
End: 2024-08-21
Payer: MEDICAID

## 2024-08-21 NOTE — TELEPHONE ENCOUNTER
Vitamin-D supplement come in capsule only..  And all capsule have gelatin which unfortunately you are allergic to.  You can increase sun exposure should help Will increase vitamin-D level.  You can take vitamin-D over-the-counter in tablets form.  Hydroxyzine also was planning to prescribe but that is also in capsule form and probably have gelatin.  Again you are allergic to gelatin and so that can not be prescribed.  You may use Benadryl tablet or syrup for itching.

## 2024-08-21 NOTE — TELEPHONE ENCOUNTER
"Patient sent a msg via [employer], "Hello, During my last visit Dr. Cunha said he was calling something in to the Pharmacy to replace the Vitamin D Gel caps and a stronger medicine for my itching however, when I went to the pharmacy nothing was called in.  Did something change?     Nati Buenrostro    Also, I am just checking to see if the order was sent over to [facility] for my therapy on my shoulder and back.  Here is their information.  [address] [place] [address]  [id number]  [phone]"    "

## 2024-08-29 ENCOUNTER — PATIENT MESSAGE (OUTPATIENT)
Dept: PRIMARY CARE CLINIC | Facility: CLINIC | Age: 59
End: 2024-08-29
Payer: MEDICAID

## 2024-08-29 NOTE — TELEPHONE ENCOUNTER
"Patient sent a msg via [employer] regarding elevated bs since last appt 08/16/24. "Hello,     Since I left my last appointment my sugar has been running high. I am having to give myself a lot of Bolus. More than normal. I am not doing anything different.      Do you think we need to increase the insulin?      Nati Buenrostro" CGM/ Insulin Pump reports are printed for review, does patient need f/u appt to eval and adjust settings?  "

## 2024-08-30 ENCOUNTER — PATIENT MESSAGE (OUTPATIENT)
Dept: INFECTIOUS DISEASES | Facility: CLINIC | Age: 59
End: 2024-08-30
Payer: MEDICAID

## 2024-08-30 NOTE — TELEPHONE ENCOUNTER
"Patient replied to your msg and states, "Also, I am almost finished with the Clotrimazole cream prescribed by the hospital for ring worms but the sores are spreading. I am thinking this is not ring worms but something else. Sores are now around my whole calf area and not on one side like before. They still itch and are red."  "

## 2024-09-03 ENCOUNTER — OFFICE VISIT (OUTPATIENT)
Dept: INFECTIOUS DISEASES | Facility: CLINIC | Age: 59
End: 2024-09-03
Payer: MEDICAID

## 2024-09-03 VITALS
BODY MASS INDEX: 29.62 KG/M2 | HEART RATE: 76 BPM | OXYGEN SATURATION: 99 % | DIASTOLIC BLOOD PRESSURE: 89 MMHG | SYSTOLIC BLOOD PRESSURE: 135 MMHG | WEIGHT: 178 LBS

## 2024-09-03 DIAGNOSIS — B35.4 TINEA CORPORIS: Primary | ICD-10-CM

## 2024-09-03 DIAGNOSIS — R21 RASH AND NONSPECIFIC SKIN ERUPTION: ICD-10-CM

## 2024-09-03 PROCEDURE — 3075F SYST BP GE 130 - 139MM HG: CPT | Mod: CPTII,S$GLB,, | Performed by: NURSE PRACTITIONER

## 2024-09-03 PROCEDURE — 4010F ACE/ARB THERAPY RXD/TAKEN: CPT | Mod: CPTII,S$GLB,, | Performed by: NURSE PRACTITIONER

## 2024-09-03 PROCEDURE — 3079F DIAST BP 80-89 MM HG: CPT | Mod: CPTII,S$GLB,, | Performed by: NURSE PRACTITIONER

## 2024-09-03 PROCEDURE — 3044F HG A1C LEVEL LT 7.0%: CPT | Mod: CPTII,S$GLB,, | Performed by: NURSE PRACTITIONER

## 2024-09-03 PROCEDURE — 99214 OFFICE O/P EST MOD 30 MIN: CPT | Mod: S$GLB,,, | Performed by: NURSE PRACTITIONER

## 2024-09-03 PROCEDURE — 1159F MED LIST DOCD IN RCRD: CPT | Mod: CPTII,S$GLB,, | Performed by: NURSE PRACTITIONER

## 2024-09-03 PROCEDURE — 3008F BODY MASS INDEX DOCD: CPT | Mod: CPTII,S$GLB,, | Performed by: NURSE PRACTITIONER

## 2024-09-03 PROCEDURE — 3066F NEPHROPATHY DOC TX: CPT | Mod: CPTII,S$GLB,, | Performed by: NURSE PRACTITIONER

## 2024-09-03 PROCEDURE — 3062F POS MACROALBUMINURIA REV: CPT | Mod: CPTII,S$GLB,, | Performed by: NURSE PRACTITIONER

## 2024-09-03 RX ORDER — TERBINAFINE HYDROCHLORIDE 250 MG/1
250 TABLET ORAL DAILY
Qty: 14 TABLET | Refills: 0 | Status: SHIPPED | OUTPATIENT
Start: 2024-09-03 | End: 2024-10-03

## 2024-09-03 RX ORDER — KETOCONAZOLE 20 MG/G
CREAM TOPICAL DAILY
Qty: 30 G | Refills: 0 | Status: SHIPPED | OUTPATIENT
Start: 2024-09-03 | End: 2024-09-17

## 2024-09-03 NOTE — PROGRESS NOTES
Infectious Diseases Clinic Note    Subjective:       Patient ID: Nati Buenrostro is a 58 y.o. female     Chief Complaint: Referral        58-year-old female here today with complaints of rash to her lower extremity.  States the rash started in her left lower leg approximately 3 months ago.  It is annular.  She was seen in ER and diagnosed with tinea corporis.  She has tried clotrimazole without improvement.  She reports the rash is spreading.  Numerous annular lesions reported to left leg only.  She does not have a cat or dog.  She is inquiring about Lyme disease testing stating she had typhus and Lyme disease at the same time.  No recent tick bites/exposure.  She was treated with doxycycline for the typhus in the past.  Denies fever, chills, body aches.  No other issues reported at this time               Past Medical History:   Diagnosis Date    Acquired hypothyroidism 5/3/2023    Asthma     Depression     Diabetes mellitus, type 2     Disorder of kidney and ureter     Focal segmental glomerulosclerosis 5/3/2023    GERD (gastroesophageal reflux disease)     Hyperlipidemia     Hypertension     Migraines     Thyroid disease     Type 2 diabetes mellitus with diabetic polyneuropathy, with long-term current use of insulin 5/3/2023       Social History     Socioeconomic History    Marital status: Single    Number of children: 2   Tobacco Use    Smoking status: Never     Passive exposure: Past    Smokeless tobacco: Never   Substance and Sexual Activity    Drug use: Never   Social History Narrative    ** Merged History Encounter **          Social Determinants of Health      Received [facility] [employer]    Food Insecurity    Received from [employer], [facility] [employer] [facility]    Housing Stability         Current Outpatient Medications:     albuterol (PROVENTIL/VENTOLIN HFA) 90 mcg/actuation inhaler, , Disp: , Rfl:     atorvastatin (LIPITOR) 80 MG tablet, Take 1  tablet (80 mg total) by mouth once daily., Disp: 90 tablet, Rfl: 3    benazepriL (LOTENSIN) 40 MG tablet, Take 40 mg by mouth once daily., Disp: , Rfl:     blood-glucose sensor (DEXCOM G7 SENSOR) [place] Rosa, 1 each by Misc.(Non-Drug; Combo Route) route every 10 days., Disp: 3 each, Rfl: 11    budesonide-formoterol 160-4.5 mcg (SYMBICORT) 160-4.5 mcg/actuation HFAA, Inhale 2 puffs into the lungs 2 (two) times a day., Disp: , Rfl:     busPIRone (BUSPAR) 10 MG tablet, Take 10 mg by mouth every morning., Disp: , Rfl:     dicyclomine HCl (DICYCLOMINE ORAL), Take 20 mg by mouth daily as needed., Disp: , Rfl:     dulaglutide (TRULICITY) 3 mg/0.5 mL pen injector, INJECT 3 MG SUBCUTANEOUSLY ONCE A WEEK, Disp: 4 Pen, Rfl: 1    EPINEPHrine (EPIPEN) 0.3 mg/0.3 mL AtIn, , Disp: , Rfl:     ergocalciferol (ERGOCALCIFEROL) 50,000 unit Cap, Take 1 capsule (50,000 Units total) by mouth every 7 days., Disp: 12 capsule, Rfl: 0    ezetimibe (ZETIA) 10 mg tablet, Take 1 tablet (10 mg total) by mouth once daily., Disp: 90 tablet, Rfl: 3    fluticasone propionate (FLONASE) 50 mcg/actuation nasal spray, , Disp: , Rfl:     glucagon (BAQSIMI) 3 mg/actuation Spry, 1 spray by Nasal route daily as needed (hypoglycemia)., Disp: 1 each, Rfl: 1    insulin aspart U-100 (NOVOLOG U-100 INSULIN ASPART) 100 unit/mL injection, Use insulin according to insulin pump, 75 units/day., Disp: 70 mL, Rfl: 3    insulin pump cart,auto,BT-cntr (OMNIPOD 5 G6 INTRO KIT, GEN 5,) Crtg, 1 kit by Other route once daily., Disp: 1 each, Rfl: 0    insulin pump cart,automated,BT (OMNIPOD 5 G6 PODS, GEN 5,) Crtg, Inject 1 each into the skin every 48 hours., Disp: 3 each, Rfl: 11    JARDIANCE 25 mg tablet, Take 1 tablet by mouth once daily, Disp: 30 tablet, Rfl: 0    ketoconazole (NIZORAL) 2 % cream, Apply topically once daily. for 14 days, Disp: 30 g, Rfl: 0    levothyroxine (SYNTHROID) 150 MCG tablet, Take 1 tablet (150 mcg total) by mouth before breakfast., Disp: 30 tablet, Rfl:  11    losartan (COZAAR) 25 MG tablet, Take 25 mg by mouth., Disp: , Rfl:     magnesium oxide 500 mg Cap, Take 1 tablet by mouth once daily., Disp: , Rfl:     montelukast (SINGULAIR) 10 mg tablet, Take 1 tablet by mouth every evening., Disp: , Rfl:     ondansetron (ZOFRAN-ODT) 4 MG TbDL, Take 4 mg by mouth 2 (two) times daily., Disp: , Rfl:     pantoprazole (PROTONIX) 40 MG tablet, Take 1 tablet (40 mg total) by mouth once daily., Disp: 30 tablet, Rfl: 11    terbinafine HCL (LAMISIL) 250 mg tablet, Take 1 tablet (250 mg total) by mouth once daily., Disp: 14 tablet, Rfl: 0    Review of Systems   Constitutional:  Negative for chills and fever.   HENT:  Negative for sore throat.    Eyes:  Negative for photophobia and visual disturbance.   Respiratory:  Negative for apnea, cough, choking, shortness of breath and wheezing.    Cardiovascular:  Negative for chest pain, palpitations and leg swelling.   Gastrointestinal:  Negative for abdominal pain, diarrhea, nausea and vomiting.   Genitourinary:  Negative for difficulty urinating, dysuria, flank pain, frequency, hematuria and urgency.   Musculoskeletal:  Negative for joint swelling and neck stiffness.   Skin:  Positive for rash.   Neurological:  Negative for dizziness, weakness, light-headedness and headaches.   Psychiatric/Behavioral:  Negative for confusion, hallucinations, self-injury and suicidal ideas.    All other systems reviewed and are negative.          Objective:      Vitals:    09/03/24 1350   BP: 135/89   Pulse: 76     Physical Exam  Vitals and nursing note reviewed.   Constitutional:       General: She is not in acute distress.     Appearance: Normal appearance. She is not ill-appearing.   HENT:      Head: Normocephalic and atraumatic.   Eyes:      General: No scleral icterus.     Pupils: Pupils are equal, round, and reactive to light.   Cardiovascular:      Rate and Rhythm: Normal rate.   Pulmonary:      Effort: Pulmonary effort is normal.   Abdominal:       General: Abdomen is flat. Bowel sounds are normal.   Musculoskeletal:         General: Normal range of motion.      Cervical back: Normal range of motion and neck supple. No rigidity or tenderness.   Lymphadenopathy:      Cervical: No cervical adenopathy.   Skin:     General: Skin is warm.      Coloration: Skin is not jaundiced.      Findings: Rash present.          Neurological:      General: No focal deficit present.      Mental Status: She is alert and oriented to person, place, and time. Mental status is at baseline.   Psychiatric:         Attention and Perception: Attention and perception normal.         Mood and Affect: Mood and affect normal.         Speech: Speech normal.         Behavior: Behavior normal. Behavior is cooperative.         Thought Content: Thought content normal.         Cognition and Memory: Cognition and memory normal.         Judgment: Judgment normal.             Assessment/Plan:       1. Tinea corporis    2. Rash and nonspecific skin eruption      Problem List Items Addressed This Visit    None  Visit Diagnoses       Tinea corporis    -  Primary    Recommend Terbinafine PO x 14 days + ketoconazole daily. Reassess in 2 weeks.    Relevant Medications    terbinafine HCL (LAMISIL) 250 mg tablet    ketoconazole (NIZORAL) 2 % cream    Rash and nonspecific skin eruption        PT requesting Lyme disease testing stating she had it and was previously treated. I explained the bullseye rash is limited to a few weeks    Relevant Orders    Lyme Disease, Ab Total w/Rfx           There is no evidence that Lyme disease is transmitted from person-to-person through touching, kissing, or having sex with a person who has Lyme disease       ADDENDUM 9/5/2024 @ 1115     Lyme total Ab  0.08 NEGATIVE       Office Visit on 08/16/2024   Component Date Value Ref Range Status    POC Glucose 08/16/2024 133 (A)  70 - 110 MG/DL Final    RBS   Patient Outreach on 08/12/2024   Component Date Value Ref Range Status     Left Eye DM Retinopathy 2024 Negative   Final    Right Eye DM Retinopathy 2024 Negative   Final      CT Chest Without Contrast    Result Date: 2024                                RADIOLOGY REPORT        PT NAME: ADELFO VALLADARES      [facility] IMAG[facility]     : 1965 F [id number]             [address]    ACCT: [id number]                                              [place]    [facility] Rec #: [id number]                                        [id number]    [facility]             Procedure: CHEST THORAX WO CONT    REQUISITION #: [id number]      REPORT #: [id number]           DATE OF EXAM: 24    TIME OF EXAM: 1530           CHEST THORAX WO CONT        Clinical indication: ABNORMAL XRAY: ABNORMAL XRAY        Technique: Axial computed tomography images obtained with coronal and   sagittal reformatted images.    Automated exposure control was used for dose reduction. 2.53 mSv            FINDINGS:        LUNGS:  Well aerated. No significant alveolar opacity. 7 mm pulmonary nodule   in the left upper lobe, unchanged from prior CT 23 and 2023. No   other pulmonary nodule identified.            HEART:  Normal size. No pericardial effusion.            GREAT VESSELS/AORTA:  No aneurysm.            LYMPH NODES:  No suspicious enlarged lymphadenopathy.            PLEURAL EFFUSION:  None.            OSSEOUS:  No acute findings. No suspicious or aggressive osseous   abnormalities/ lesions.            IMPRESSION:    7 mm pulmonary nodule in the left upper lobe, unchanged from prior CT 23   and 2023        FOLLOW-UP RECOMMENDATIONS BASED ON ACR LUNG RADS            ACR Lung RADS score 3:  Probably benign. Follow-up 6 month low dose CT   recommended    Solid nodule(s):  e 6 to < 8 mm  at baseline OR new 4 mm to < 6 mm    Part solid nodule(s) e 6 mm total diameter  with solid component < 6 mm  OR    new < 6 mm total diameter    Non solid nodule(s) GGN) e  30 mm baseline CT or new        Signer Name: Rene Willard MD    Signed: 8/16/2024 4:38 PM CDT    ()        DICTATING PHYSICIAN:  RENE WILLARD MD                   Date Dictated: 08/16/24 1510        Signed By:  RENE WILLARD MD     Date Signed:  08/16/24 1642     CC: MARK PHILIPPE MD ; MARK PHILIPPE MD      ADMITTING PHYSICIAN:                                                                                                    ORDERING PHY: MARK PHILIPPE MD                                                                                                                                                      ATTENDING PHY: MARK PHILIPPE MD    Patient Status:  REG CLI    Admit Service Date: 08/16/24               Duration of encounter: minutes  This includes face-to-face time and non face-to-face time preparing to see the patient (eg, review of tests), obtaining and/or reviewing separately obtained history, documenting clinical information in the electronic or other health record, independently interpreting resultsand communicating results to the patient/family/caregiver, or care coordination.      All diagnostic data (labs/imaging) was reviewed with the patient and/or family member in the room.  All questions were answered to their liking. The patient and/or family member voiced understanding of all instructions provided. Expectations regarding follow up and treatment plan were voiced and confirmed prior to departure. The patient was given orders/instructions at the end of the visit for reference. They were instructed to notify my office if they have not been contacted for imaging/referrals/labs/results in 1-2 weeks. They voiced understanding of all of the above.     DISCLAIMER: This note was prepared with [employer] voice recognition transcription software. Garbled syntax, mangled pronouns, and other bizarre constructions may be attributed to that software system.     Follow up:     There are  no Patient Instructions on file for this visit.     Follow up in about 2 weeks (around 9/17/2024), or if symptoms worsen or fail to improve.

## 2024-09-05 ENCOUNTER — TELEPHONE (OUTPATIENT)
Dept: FAMILY MEDICINE | Facility: CLINIC | Age: 59
End: 2024-09-05

## 2024-09-10 DIAGNOSIS — E11.42 TYPE 2 DIABETES MELLITUS WITH DIABETIC POLYNEUROPATHY, WITH LONG-TERM CURRENT USE OF INSULIN: ICD-10-CM

## 2024-09-10 DIAGNOSIS — Z79.4 TYPE 2 DIABETES MELLITUS WITH DIABETIC POLYNEUROPATHY, WITH LONG-TERM CURRENT USE OF INSULIN: ICD-10-CM

## 2024-09-10 RX ORDER — EMPAGLIFLOZIN 25 MG/1
25 TABLET, FILM COATED ORAL
Qty: 30 TABLET | Refills: 0 | Status: SHIPPED | OUTPATIENT
Start: 2024-09-10

## 2024-09-16 DIAGNOSIS — E03.9 ACQUIRED HYPOTHYROIDISM: ICD-10-CM

## 2024-09-16 DIAGNOSIS — F33.2 SEVERE EPISODE OF RECURRENT MAJOR DEPRESSIVE DISORDER, WITHOUT PSYCHOTIC FEATURES: ICD-10-CM

## 2024-09-16 RX ORDER — SERTRALINE HYDROCHLORIDE 100 MG/1
100 TABLET, FILM COATED ORAL
Qty: 30 TABLET | Refills: 0 | OUTPATIENT
Start: 2024-09-16

## 2024-09-16 RX ORDER — LEVOTHYROXINE SODIUM 150 UG/1
150 TABLET ORAL
Qty: 30 TABLET | Refills: 0 | Status: SHIPPED | OUTPATIENT
Start: 2024-09-16

## 2024-09-20 DIAGNOSIS — E11.42 TYPE 2 DIABETES MELLITUS WITH DIABETIC POLYNEUROPATHY, WITH LONG-TERM CURRENT USE OF INSULIN: ICD-10-CM

## 2024-09-20 DIAGNOSIS — Z79.4 TYPE 2 DIABETES MELLITUS WITH DIABETIC POLYNEUROPATHY, WITH LONG-TERM CURRENT USE OF INSULIN: ICD-10-CM

## 2024-09-20 RX ORDER — DULAGLUTIDE 3 MG/.5ML
INJECTION, SOLUTION SUBCUTANEOUS
Qty: 4 PEN | Refills: 2 | Status: SHIPPED | OUTPATIENT
Start: 2024-09-20

## 2024-09-23 ENCOUNTER — OFFICE VISIT (OUTPATIENT)
Dept: INFECTIOUS DISEASES | Facility: CLINIC | Age: 59
End: 2024-09-23
Payer: MEDICAID

## 2024-09-23 VITALS — DIASTOLIC BLOOD PRESSURE: 81 MMHG | SYSTOLIC BLOOD PRESSURE: 139 MMHG | OXYGEN SATURATION: 99 % | HEART RATE: 84 BPM

## 2024-09-23 DIAGNOSIS — Z71.2 ENCOUNTER TO DISCUSS TEST RESULTS: ICD-10-CM

## 2024-09-23 DIAGNOSIS — R21 RASH AND NONSPECIFIC SKIN ERUPTION: Primary | ICD-10-CM

## 2024-09-23 PROCEDURE — 3079F DIAST BP 80-89 MM HG: CPT | Mod: CPTII,S$GLB,, | Performed by: NURSE PRACTITIONER

## 2024-09-23 PROCEDURE — 99213 OFFICE O/P EST LOW 20 MIN: CPT | Mod: S$GLB,,, | Performed by: NURSE PRACTITIONER

## 2024-09-23 PROCEDURE — 1159F MED LIST DOCD IN RCRD: CPT | Mod: CPTII,S$GLB,, | Performed by: NURSE PRACTITIONER

## 2024-09-23 PROCEDURE — 3062F POS MACROALBUMINURIA REV: CPT | Mod: CPTII,S$GLB,, | Performed by: NURSE PRACTITIONER

## 2024-09-23 PROCEDURE — 4010F ACE/ARB THERAPY RXD/TAKEN: CPT | Mod: CPTII,S$GLB,, | Performed by: NURSE PRACTITIONER

## 2024-09-23 PROCEDURE — 3075F SYST BP GE 130 - 139MM HG: CPT | Mod: CPTII,S$GLB,, | Performed by: NURSE PRACTITIONER

## 2024-09-23 PROCEDURE — 3044F HG A1C LEVEL LT 7.0%: CPT | Mod: CPTII,S$GLB,, | Performed by: NURSE PRACTITIONER

## 2024-09-23 PROCEDURE — 3066F NEPHROPATHY DOC TX: CPT | Mod: CPTII,S$GLB,, | Performed by: NURSE PRACTITIONER

## 2024-09-23 NOTE — PROGRESS NOTES
Infectious Diseases Clinic Note    Subjective:       Patient ID: Nati Buenrostro is a 58 y.o. female     Chief Complaint: Follow-up        58-year-old female here today for 3 week follow up of rash to her lower extremity.  States the rash started in her left lower leg approximately 3 months ago.  It is annular.  She was seen in ER and diagnosed with tinea corporis.  She has tried clotrimazole without improvement.  Her last encounter, she was prescribed terbinafine 250 mg x 14 days in addition to ketoconazole lotion.  No improvement reported.  She was requesting Lyme disease testing at the time.  Lyme antibody was non-reactive. Numerous annular lesions reported to left leg only.  She does not have a cat or dog.   She was treated with doxycycline for the typhus in the past.  Denies fever, chills, body aches.  No other issues reported at this time               Past Medical History:   Diagnosis Date    Acquired hypothyroidism 5/3/2023    Asthma     Depression     Diabetes mellitus, type 2     Disorder of kidney and ureter     Focal segmental glomerulosclerosis 5/3/2023    GERD (gastroesophageal reflux disease)     Hyperlipidemia     Hypertension     Migraines     Thyroid disease     Type 2 diabetes mellitus with diabetic polyneuropathy, with long-term current use of insulin 5/3/2023       Social History     Socioeconomic History    Marital status: Single    Number of children: 2   Tobacco Use    Smoking status: Never     Passive exposure: Past    Smokeless tobacco: Never   Substance and Sexual Activity    Drug use: Never   Social History Narrative    ** Merged History Encounter **          Social Determinants of Health      [facility]    Food Insecurity    Received from [employer], [facility]    Housing Stability         Current Outpatient Medications:     albuterol (PROVENTIL/VENTOLIN HFA) 90 mcg/actuation inhaler, , Disp: ,  Rfl:     atorvastatin (LIPITOR) 80 MG tablet, Take 1 tablet (80 mg total) by mouth once daily., Disp: 90 tablet, Rfl: 3    benazepriL (LOTENSIN) 40 MG tablet, Take 40 mg by mouth once daily., Disp: , Rfl:     blood-glucose sensor (DEXCOM G7 SENSOR) Al[place] Rosa, 1 each by Misc.(Non-Drug; Combo Route) route every 10 days., Disp: 3 each, Rfl: 11    budesonide-formoterol 160-4.5 mcg (SYMBICORT) 160-4.5 mcg/actuation HFAA, Inhale 2 puffs into the lungs 2 (two) times a day., Disp: , Rfl:     busPIRone (BUSPAR) 10 MG tablet, Take 10 mg by mouth every morning., Disp: , Rfl:     dicyclomine HCl (DICYCLOMINE ORAL), Take 20 mg by mouth daily as needed., Disp: , Rfl:     dulaglutide (TRULICITY) 3 mg/0.5 mL pen injector, INJECT  3 MG  SUBCUTANEOUSLY ONCE A WEEK, Disp: 4 Pen, Rfl: 2    EPINEPHrine (EPIPEN) 0.3 mg/0.3 mL AtIn, , Disp: , Rfl:     ergocalciferol (ERGOCALCIFEROL) 50,000 unit Cap, Take 1 capsule (50,000 Units total) by mouth every 7 days., Disp: 12 capsule, Rfl: 0    ezetimibe (ZETIA) 10 mg tablet, Take 1 tablet (10 mg total) by mouth once daily., Disp: 90 tablet, Rfl: 3    fluticasone propionate (FLONASE) 50 mcg/actuation nasal spray, , Disp: , Rfl:     glucagon (BAQSIMI) 3 mg/actuation Spry, 1 spray by Nasal route daily as needed (hypoglycemia)., Disp: 1 each, Rfl: 1    insulin aspart U-100 (NOVOLOG U-100 INSULIN ASPART) 100 unit/mL injection, Use insulin according to insulin pump, 75 units/day., Disp: 70 mL, Rfl: 3    insulin pump cart,auto,BT-cntr (OMNIPOD 5 G6 INTRO KIT, GEN 5,) Crtg, 1 kit by Other route once daily., Disp: 1 each, Rfl: 0    insulin pump cart,automated,BT (OMNIPOD 5 G6 PODS, GEN 5,) Crtg, Inject 1 each into the skin every 48 hours., Disp: 3 each, Rfl: 11    JARDIANCE 25 mg tablet, Take 1 tablet by mouth once daily, Disp: 30 tablet, Rfl: 0    ketoconazole (NIZORAL) 2 % cream, Apply topically once daily. for 14 days, Disp: 30 g, Rfl: 0    levothyroxine (SYNTHROID) 150 MCG tablet, TAKE 1 TABLET BY MOUTH  ONCE DAILY BEFORE  BREAKFAST, Disp: 30 tablet, Rfl: 0    losartan (COZAAR) 25 MG tablet, Take 25 mg by mouth., Disp: , Rfl:     magnesium oxide 500 mg Cap, Take 1 tablet by mouth once daily., Disp: , Rfl:     montelukast (SINGULAIR) 10 mg tablet, Take 1 tablet by mouth every evening., Disp: , Rfl:     ondansetron (ZOFRAN-ODT) 4 MG TbDL, Take 4 mg by mouth 2 (two) times daily., Disp: , Rfl:     pantoprazole (PROTONIX) 40 MG tablet, Take 1 tablet (40 mg total) by mouth once daily., Disp: 30 tablet, Rfl: 11    terbinafine HCL (LAMISIL) 250 mg tablet, Take 1 tablet (250 mg total) by mouth once daily., Disp: 14 tablet, Rfl: 0    Review of Systems   Constitutional:  Negative for chills and fever.   HENT:  Negative for sore throat.    Eyes:  Negative for photophobia and visual disturbance.   Respiratory:  Negative for apnea, cough, choking, shortness of breath and wheezing.    Cardiovascular:  Negative for chest pain, palpitations and leg swelling.   Gastrointestinal:  Negative for abdominal pain, diarrhea, nausea and vomiting.   Genitourinary:  Negative for difficulty urinating, dysuria, flank pain, frequency, hematuria and urgency.   Musculoskeletal:  Negative for joint swelling and neck stiffness.   Skin:  Positive for rash.   Neurological:  Negative for dizziness, weakness, light-headedness and headaches.   Psychiatric/Behavioral:  Negative for confusion, hallucinations, self-injury and suicidal ideas.    All other systems reviewed and are negative.          Objective:      Vitals:    09/23/24 1449   BP: 139/81   Pulse: 84     Physical Exam  Vitals and nursing note reviewed.   Constitutional:       General: She is not in acute distress.     Appearance: Normal appearance. She is not ill-appearing.   HENT:      Head: Normocephalic and atraumatic.   Eyes:      General: No scleral icterus.     Pupils: Pupils are equal, round, and reactive to light.   Cardiovascular:      Rate and Rhythm: Normal rate.   Pulmonary:       Effort: Pulmonary effort is normal.   Abdominal:      General: Abdomen is flat. Bowel sounds are normal.   Musculoskeletal:         General: Normal range of motion.      Cervical back: Normal range of motion and neck supple. No rigidity or tenderness.   Lymphadenopathy:      Cervical: No cervical adenopathy.   Skin:     General: Skin is warm.      Coloration: Skin is not jaundiced.      Findings: Rash present.          Neurological:      General: No focal deficit present.      Mental Status: She is alert and oriented to person, place, and time. Mental status is at baseline.   Psychiatric:         Attention and Perception: Attention and perception normal.         Mood and Affect: Mood and affect normal.         Speech: Speech normal.         Behavior: Behavior normal. Behavior is cooperative.         Thought Content: Thought content normal.         Cognition and Memory: Cognition and memory normal.         Judgment: Judgment normal.             Assessment/Plan:       1. Rash and nonspecific skin eruption    2. Encounter to discuss test results      Problem List Items Addressed This Visit    None  Visit Diagnoses       Rash and nonspecific skin eruption    -  Primary    Lyme AB non-reactive.  Failed PO Terbinafine and Ketoconazole lotion/shampoo. Recommend Derm Bx. Will refer    Relevant Orders    Ambulatory referral/consult to Dermatology    Encounter to discuss test results        Lyme AB non - reactive           No visits with results within 1 Month(s) from this visit.   Latest known visit with results is:   Office Visit on 08/16/2024   Component Date Value Ref Range Status    POC Glucose 08/16/2024 133 (A)  70 - 110 MG/DL Final    RBS      No results found in the last 30 days.      Duration of encounter: minutes  This includes face-to-face time and non face-to-face time preparing to see the patient (eg, review of tests), obtaining and/or reviewing separately obtained history, documenting clinical information in  the electronic or other health record, independently interpreting resultsand communicating results to the patient/family/caregiver, or care coordination.      All diagnostic data (labs/imaging) was reviewed with the patient and/or family member in the room.  All questions were answered to their liking. The patient and/or family member voiced understanding of all instructions provided. Expectations regarding follow up and treatment plan were voiced and confirmed prior to departure. The patient was given orders/instructions at the end of the visit for reference. They were instructed to notify my office if they have not been contacted for imaging/referrals/labs/results in 1-2 weeks. They voiced understanding of all of the above.     DISCLAIMER: This note was prepared with [employer] voice recognition transcription software. Garbled syntax, mangled pronouns, and other bizarre constructions may be attributed to that software system.     Follow up:     There are no Patient Instructions on file for this visit.     Follow up if symptoms worsen or fail to improve.

## 2024-09-26 ENCOUNTER — OFFICE VISIT (OUTPATIENT)
Dept: PRIMARY CARE CLINIC | Facility: CLINIC | Age: 59
End: 2024-09-26
Payer: MEDICAID

## 2024-09-26 VITALS
WEIGHT: 177.19 LBS | SYSTOLIC BLOOD PRESSURE: 126 MMHG | HEART RATE: 66 BPM | HEIGHT: 65 IN | RESPIRATION RATE: 16 BRPM | OXYGEN SATURATION: 99 % | TEMPERATURE: 98 F | BODY MASS INDEX: 29.52 KG/M2 | DIASTOLIC BLOOD PRESSURE: 82 MMHG

## 2024-09-26 DIAGNOSIS — N05.1 FOCAL SEGMENTAL GLOMERULOSCLEROSIS: ICD-10-CM

## 2024-09-26 DIAGNOSIS — K21.00 GASTROESOPHAGEAL REFLUX DISEASE WITH ESOPHAGITIS WITHOUT HEMORRHAGE: ICD-10-CM

## 2024-09-26 DIAGNOSIS — E11.42 TYPE 2 DIABETES MELLITUS WITH DIABETIC POLYNEUROPATHY, WITH LONG-TERM CURRENT USE OF INSULIN: Primary | ICD-10-CM

## 2024-09-26 DIAGNOSIS — Z79.4 TYPE 2 DIABETES MELLITUS WITH DIABETIC POLYNEUROPATHY, WITH LONG-TERM CURRENT USE OF INSULIN: Primary | ICD-10-CM

## 2024-09-26 DIAGNOSIS — E03.9 ACQUIRED HYPOTHYROIDISM: ICD-10-CM

## 2024-09-26 LAB — GLUCOSE SERPL-MCNC: 128 MG/DL (ref 70–110)

## 2024-09-26 RX ORDER — OMEPRAZOLE 40 MG/1
40 CAPSULE, DELAYED RELEASE ORAL DAILY
Qty: 90 CAPSULE | Refills: 3 | Status: SHIPPED | OUTPATIENT
Start: 2024-09-26 | End: 2025-09-26

## 2024-09-26 NOTE — PROGRESS NOTES
Subjective:      Patient ID: Nati Buenrostro is a 58 y.o. female.    Chief Complaint: Diabetes (4month f/u )    :  Patient with diabetes with last A1c of 6.6 is at goal.  Patient is on CGM and the data is downloaded that suggest that average blood sugar is running 149 with standard deviation of 42.  Patient blood sugars are in range 80% of the time running high 18% of the time and very high 2% of the time.  Patient denies any hypoglycemic episodes.  Patient insulin pump data suggest that patient is in manual mode 100% of the time.  Patient is taking 94% of insulin as basal and 6% of insulin as boluses.    Patient has GERD x few years.  Patient reports having burning epigastric pain that improves with PPI but still there.  Patient reports if she misses the dose of medication she has severe abdominal pain.  Patient takes coffee early morning on empty stomach, denies use of soda, denies use of NSAIDs, ibuprofen/Aleeve.     Review of Systems   Constitutional:  Negative for chills, diaphoresis, fever, malaise/fatigue and weight loss.   HENT:  Negative for congestion, ear pain, sinus pain, sore throat and tinnitus.    Eyes:  Negative for blurred vision and photophobia.   Respiratory:  Negative for cough, hemoptysis, shortness of breath and wheezing.    Cardiovascular:  Negative for chest pain, palpitations, orthopnea, leg swelling and PND.   Gastrointestinal:  Positive for abdominal pain and heartburn. Negative for blood in stool, constipation, diarrhea, melena, nausea and vomiting.   Genitourinary:  Negative for dysuria, frequency and urgency.   Musculoskeletal:  Negative for back pain, myalgias and neck pain.   Skin:  Negative for rash.   Neurological:  Negative for dizziness, tremors, seizures, loss of consciousness and weakness.   Endo/Heme/Allergies:  Negative for polydipsia.   Psychiatric/Behavioral:  Negative for depression and hallucinations. The patient does not have insomnia.      Objective:   /82 (BP  "Location: Left arm, Patient Position: Sitting, BP Method: Medium (Automatic))   Pulse 66   Temp 98.3 °F (36.8 °C) (Oral)   Resp 16   Ht 5' 5" (1.651 m)   Wt 80.4 kg (177 lb 3.2 oz)   SpO2 99%   BMI 29.49 kg/m²     Physical Exam  Constitutional:       General: She is not in acute distress.     Appearance: She is not diaphoretic.   Neck:      Thyroid: No thyromegaly.   Cardiovascular:      Rate and Rhythm: Normal rate and regular rhythm.      Heart sounds: Normal heart sounds. No murmur heard.  Pulmonary:      Effort: Pulmonary effort is normal. No respiratory distress.      Breath sounds: Normal breath sounds. No wheezing.   Abdominal:      General: Bowel sounds are normal. There is no distension.      Palpations: Abdomen is soft.      Tenderness: There is no abdominal tenderness.   Lymphadenopathy:      Cervical: No cervical adenopathy.   Neurological:      Mental Status: She is alert and oriented to person, place, and time.   Psychiatric:         Behavior: Behavior normal.         Thought Content: Thought content normal.         Judgment: Judgment normal.       Assessment:       ICD-10-CM ICD-9-CM   1. Type 2 diabetes mellitus with diabetic polyneuropathy, with long-term current use of insulin  E11.42 250.60    Z79.4 357.2     V58.67   2. Acquired hypothyroidism  E03.9 244.9   3. Focal segmental glomerulosclerosis  N05.1 582.1   4. Gastroesophageal reflux disease with esophagitis without hemorrhage  K21.00 530.81     530.10       Plan:     Patient with diabetes with last A1c of 6.6 is at goal  CGM data suggest that blood sugars are under good control as well  Patient is using 94% of insulin as basal and 6% units as bolus  Advised patient to bolus more frequently  Will check A1c and CMP before next appointment  Patient has hypertension and blood pressure seem under good control  Patient has FSGS and is being followed by nephrologist  Patient is on ARB and is inhibitor according to Nephrology " recommendation  Patient has persistent GERD that is not responding to PPI  EGD is warranted to rule out Franco esophagus  Will refer to GI..  Will hold dicyclomine.  Advised patient to avoid coffee on empty stomach  Patient reports omeprazole works better than pantoprazole.  Will change  Advised patient to get flu vaccine  Medication List with Changes/Refills   New Medications    OMEPRAZOLE (PRILOSEC) 40 MG CAPSULE    Take 1 capsule (40 mg total) by mouth once daily.   Current Medications    ALBUTEROL (PROVENTIL/VENTOLIN HFA) 90 MCG/ACTUATION INHALER        ATORVASTATIN (LIPITOR) 80 MG TABLET    Take 1 tablet (80 mg total) by mouth once daily.    BENAZEPRIL (LOTENSIN) 40 MG TABLET    Take 40 mg by mouth once daily.    BLOOD-GLUCOSE SENSOR (DEXCOM G7 SENSOR) RIDDHI    1 each by Misc.(Non-Drug; Combo Route) route every 10 days.    BUDESONIDE-FORMOTEROL 160-4.5 MCG (SYMBICORT) 160-4.5 MCG/ACTUATION HFAA    Inhale 2 puffs into the lungs 2 (two) times a day.    BUSPIRONE (BUSPAR) 10 MG TABLET    Take 10 mg by mouth every morning.    DULAGLUTIDE (TRULICITY) 3 MG/0.5 ML PEN INJECTOR    INJECT  3 MG  SUBCUTANEOUSLY ONCE A WEEK    EPINEPHRINE (EPIPEN) 0.3 MG/0.3 ML ATIN        EZETIMIBE (ZETIA) 10 MG TABLET    Take 1 tablet (10 mg total) by mouth once daily.    FLUTICASONE PROPIONATE (FLONASE) 50 MCG/ACTUATION NASAL SPRAY        GLUCAGON (BAQSIMI) 3 MG/ACTUATION SPRY    1 spray by Nasal route daily as needed (hypoglycemia).    INSULIN ASPART U-100 (NOVOLOG U-100 INSULIN ASPART) 100 UNIT/ML INJECTION    Use insulin according to insulin pump, 75 units/day.    INSULIN PUMP CART,AUTO,BT-CNTR (OMNIPOD 5 G6 INTRO KIT, GEN 5,) CRTG    1 kit by Other route once daily.    INSULIN PUMP CART,AUTOMATED,BT (OMNIPOD 5 G6 PODS, GEN 5,) CRTG    Inject 1 each into the skin every 48 hours.    JARDIANCE 25 MG TABLET    Take 1 tablet by mouth once daily    KETOCONAZOLE (NIZORAL) 2 % CREAM    Apply topically once daily. for 14 days     LEVOTHYROXINE (SYNTHROID) 150 MCG TABLET    TAKE 1 TABLET BY MOUTH ONCE DAILY BEFORE  BREAKFAST    LOSARTAN (COZAAR) 25 MG TABLET    Take 25 mg by mouth.    MAGNESIUM OXIDE 500 MG CAP    Take 1 tablet by mouth once daily.    MONTELUKAST (SINGULAIR) 10 MG TABLET    Take 1 tablet by mouth every evening.    ONDANSETRON (ZOFRAN-ODT) 4 MG TBDL    Take 4 mg by mouth 2 (two) times daily.   Discontinued Medications    DICYCLOMINE HCL (DICYCLOMINE ORAL)    Take 20 mg by mouth daily as needed.    ERGOCALCIFEROL (ERGOCALCIFEROL) 50,000 UNIT CAP    Take 1 capsule (50,000 Units total) by mouth every 7 days.    PANTOPRAZOLE (PROTONIX) 40 MG TABLET    Take 1 tablet (40 mg total) by mouth once daily.    TERBINAFINE HCL (LAMISIL) 250 MG TABLET    Take 1 tablet (250 mg total) by mouth once daily.

## 2024-10-13 DIAGNOSIS — E11.42 TYPE 2 DIABETES MELLITUS WITH DIABETIC POLYNEUROPATHY, WITH LONG-TERM CURRENT USE OF INSULIN: ICD-10-CM

## 2024-10-13 DIAGNOSIS — Z79.4 TYPE 2 DIABETES MELLITUS WITH DIABETIC POLYNEUROPATHY, WITH LONG-TERM CURRENT USE OF INSULIN: ICD-10-CM

## 2024-10-14 RX ORDER — EMPAGLIFLOZIN 25 MG/1
25 TABLET, FILM COATED ORAL
Qty: 30 TABLET | Refills: 0 | Status: SHIPPED | OUTPATIENT
Start: 2024-10-14

## 2024-11-11 DIAGNOSIS — E11.42 TYPE 2 DIABETES MELLITUS WITH DIABETIC POLYNEUROPATHY, WITH LONG-TERM CURRENT USE OF INSULIN: ICD-10-CM

## 2024-11-11 DIAGNOSIS — E78.2 MIXED HYPERLIPIDEMIA: ICD-10-CM

## 2024-11-11 DIAGNOSIS — Z79.4 TYPE 2 DIABETES MELLITUS WITH DIABETIC POLYNEUROPATHY, WITH LONG-TERM CURRENT USE OF INSULIN: ICD-10-CM

## 2024-11-11 RX ORDER — ATORVASTATIN CALCIUM 80 MG/1
80 TABLET, FILM COATED ORAL
Qty: 30 TABLET | Refills: 0 | Status: SHIPPED | OUTPATIENT
Start: 2024-11-11

## 2024-11-11 RX ORDER — EZETIMIBE 10 MG/1
10 TABLET ORAL
Qty: 30 TABLET | Refills: 0 | Status: SHIPPED | OUTPATIENT
Start: 2024-11-11

## 2024-11-11 RX ORDER — EMPAGLIFLOZIN 25 MG/1
25 TABLET, FILM COATED ORAL
Qty: 30 TABLET | Refills: 0 | Status: SHIPPED | OUTPATIENT
Start: 2024-11-11

## 2024-11-21 ENCOUNTER — CLINICAL SUPPORT (OUTPATIENT)
Dept: OBSTETRICS AND GYNECOLOGY | Facility: CLINIC | Age: 59
End: 2024-11-21
Payer: MEDICAID

## 2024-11-21 DIAGNOSIS — Z01.89 ROUTINE LAB DRAW: Primary | ICD-10-CM

## 2024-11-21 LAB
ALBUMIN SERPL BCP-MCNC: 3.6 G/DL (ref 3.4–5)
ALBUMIN/GLOBULIN RATIO: 1 RATIO (ref 1.1–1.8)
ALP SERPL-CCNC: 93 U/L (ref 45–117)
ALT SERPL W P-5'-P-CCNC: 33 U/L (ref 13–56)
ANION GAP SERPL CALC-SCNC: 9 MMOL/L (ref 3–11)
AST SERPL-CCNC: 25 U/L (ref 15–37)
BASOPHILS NFR BLD: 0.5 % (ref 0–3)
BILIRUB SERPL-MCNC: 0.2 MG/DL (ref 0.2–1)
BUN SERPL-MCNC: 15 MG/DL (ref 7–18)
BUN/CREAT SERPL: 19.23 RATIO
CALCIUM SERPL-MCNC: 10 MG/DL (ref 8.5–10.1)
CHLORIDE SERPL-SCNC: 104 MMOL/L (ref 98–107)
CHOLEST SERPL-MSCNC: 170 MG/DL
CO2 SERPL-SCNC: 28 MMOL/L (ref 21–32)
CREAT SERPL-MCNC: 0.78 MG/DL (ref 0.55–1.02)
CREATININE, URINE: 67.4 MG/DL (ref 10–175)
EOSINOPHIL NFR BLD: 1.8 % (ref 0–6)
ERYTHROCYTE [DISTWIDTH] IN BLOOD BY AUTOMATED COUNT: 13.3 % (ref 0–15.5)
GFR ESTIMATION: 89
GLOBULIN: 3.7 G/DL (ref 2.3–3.5)
GLUCOSE SERPL-MCNC: 125 MG/DL (ref 74–106)
HBA1C MFR BLD: 7.1 % (ref 4.2–6.3)
HCT VFR BLD AUTO: 42.9 % (ref 37–47)
HDL/CHOLESTEROL RATIO: 2.6 RATIO
HDLC SERPL-MCNC: 66 MG/DL
HGB BLD-MCNC: 13.2 G/DL (ref 12–16)
LDLC SERPL CALC-MCNC: 88.2 MG/DL
LYMPHOCYTES NFR BLD: 28.3 % (ref 20–45)
MCH RBC QN AUTO: 25.3 PG (ref 27–32)
MCHC RBC AUTO-ENTMCNC: 30.8 % (ref 32–36)
MCV RBC AUTO: 82.3 FL (ref 80–99)
MICROALBUMIN URINE: 587.6 MG/L (ref 0–20)
MICROALBUMIN/CREATININE RATIO: 871 MG/G (ref 0–30)
MONOCYTES NFR BLD: 8.3 % (ref 2–10)
NEUTROPHILS # BLD AUTO: 3.8 10*3/UL (ref 1.4–7)
NEUTROPHILS NFR BLD: 60.9 % (ref 50–80)
NUCLEATED RED BLOOD CELLS: 0 % (ref 0–0.2)
PLATELETS: 336 10*3/UL (ref 130–400)
POTASSIUM SERPL-SCNC: 4.1 MMOL/L (ref 3.5–5.1)
PROT SERPL-MCNC: 7.3 G/DL (ref 6.4–8.2)
RBC # BLD AUTO: 5.21 10*6/UL (ref 4.2–5.4)
SODIUM BLD-SCNC: 141 MMOL/L (ref 131–143)
TRIGL SERPL-MCNC: 79 MG/DL (ref 0–149)
TSH SERPL DL<=0.005 MIU/L-ACNC: 7.24 UIU/ML (ref 0.36–3.74)
VLDL CHOLESTEROL: 16 MG/DL
WBC # BLD: 6.2 10*3/UL (ref 4.5–10)

## 2024-11-26 ENCOUNTER — PATIENT MESSAGE (OUTPATIENT)
Dept: PRIMARY CARE CLINIC | Facility: CLINIC | Age: 59
End: 2024-11-26
Payer: MEDICAID

## 2024-11-26 ENCOUNTER — TELEPHONE (OUTPATIENT)
Dept: PRIMARY CARE CLINIC | Facility: CLINIC | Age: 59
End: 2024-11-26
Payer: MEDICAID

## 2024-11-26 NOTE — TELEPHONE ENCOUNTER
----- Message from Med Assistant Singh sent at 11/26/2024  1:43 PM CST -----  Regarding: RE: GI referral  Contact: santy Jose thank you  ----- Message -----  From: Kristie Baez  Sent: 11/26/2024   1:42 PM CST  To: Samantha Amaro MA  Subject: GI referral                                      I already faxed it when she came in here this morning.  ----- Message -----  From: Samantha Amaro MA  Sent: 11/26/2024   1:37 PM CST  To: Kristie Baez    Hey can you send this referral off for her  ----- Message -----  From: Susie Bella  Sent: 11/26/2024  10:27 AM CST  To: Alphonse Montalvo Staff    Patient is calling in regards to she says that she will need her referral for GI faxed to [facility] [phone]. She says that it can't wait until the end of the day.

## 2024-12-04 ENCOUNTER — OFFICE VISIT (OUTPATIENT)
Dept: PRIMARY CARE CLINIC | Facility: CLINIC | Age: 59
End: 2024-12-04
Payer: MEDICAID

## 2024-12-04 ENCOUNTER — TELEPHONE (OUTPATIENT)
Dept: PRIMARY CARE CLINIC | Facility: CLINIC | Age: 59
End: 2024-12-04

## 2024-12-04 VITALS
HEIGHT: 65 IN | RESPIRATION RATE: 16 BRPM | TEMPERATURE: 98 F | SYSTOLIC BLOOD PRESSURE: 155 MMHG | OXYGEN SATURATION: 99 % | BODY MASS INDEX: 29.93 KG/M2 | WEIGHT: 179.63 LBS | DIASTOLIC BLOOD PRESSURE: 82 MMHG | HEART RATE: 69 BPM

## 2024-12-04 DIAGNOSIS — Z12.31 BREAST CANCER SCREENING BY MAMMOGRAM: ICD-10-CM

## 2024-12-04 DIAGNOSIS — Z79.4 TYPE 2 DIABETES MELLITUS WITH DIABETIC POLYNEUROPATHY, WITH LONG-TERM CURRENT USE OF INSULIN: Primary | ICD-10-CM

## 2024-12-04 DIAGNOSIS — N05.1 FOCAL SEGMENTAL GLOMERULOSCLEROSIS: ICD-10-CM

## 2024-12-04 DIAGNOSIS — E11.42 TYPE 2 DIABETES MELLITUS WITH DIABETIC POLYNEUROPATHY, WITH LONG-TERM CURRENT USE OF INSULIN: Primary | ICD-10-CM

## 2024-12-04 LAB — GLUCOSE SERPL-MCNC: 161 MG/DL (ref 70–110)

## 2024-12-04 RX ORDER — PANTOPRAZOLE SODIUM 40 MG/1
40 TABLET, DELAYED RELEASE ORAL DAILY
COMMUNITY

## 2024-12-04 RX ORDER — SEMAGLUTIDE 0.68 MG/ML
0.25 INJECTION, SOLUTION SUBCUTANEOUS
Qty: 1 EACH | Refills: 0 | Status: SHIPPED | OUTPATIENT
Start: 2024-12-04

## 2024-12-04 NOTE — PROGRESS NOTES
Subjective:      Patient ID: Nati Buenrostro is a 59 y.o. female.    Chief Complaint: Diabetes (2month f/u)    Patient with diabetes with last A1c of 7.1 worsen from 6.6.  Patient has CGM and the data is downloaded that suggest that average blood sugar is running 192 with standard deviation of 60.  Patient blood sugars are in range 51% of the time running high 31% of the time and very high 18% of the time.  Patient blood sugars seem to have worsen from last visit.  Patient insulin pump data suggest that patient is receiving 86% of the insulin as basal and only 14% as bolus.  Patient is on manual mode all the time.  Patient Omnipod is now compatible with CGM and automated mode will be used.    Patient has FSGS and is being followed by nephrologist.  Patient is on benazepril and losartan.  Patient blood pressure seem to be running high today.  Patient blood pressure previously were under good control but are running high today because of patient being out of benazepril.    Patient has history of left shoulder pain and was undergoing physical therapy for frozen shoulder.  Patient did not noticed significant improvement and went to chiropractor.  Patient reports pain is markedly improved with chiropractor treatment.  Patient has mild nagging headache on and off that improves with Tylenol.  Patient denies any photophobia phonophobia, aura.    Review of Systems   Constitutional:  Negative for chills, diaphoresis, fever, malaise/fatigue and weight loss.   HENT:  Negative for congestion, ear pain, sinus pain, sore throat and tinnitus.    Eyes:  Negative for blurred vision and photophobia.   Respiratory:  Negative for cough, hemoptysis, shortness of breath and wheezing.    Cardiovascular:  Negative for chest pain, palpitations, orthopnea, leg swelling and PND.   Gastrointestinal:  Positive for nausea. Negative for abdominal pain, blood in stool, constipation, diarrhea, heartburn, melena and vomiting.   Genitourinary:   "Negative for dysuria, frequency and urgency.   Musculoskeletal:  Negative for back pain, myalgias and neck pain.   Skin:  Negative for rash.   Neurological:  Positive for headaches. Negative for dizziness, tremors, seizures, loss of consciousness and weakness.   Endo/Heme/Allergies:  Negative for polydipsia.   Psychiatric/Behavioral:  Negative for depression and hallucinations. The patient does not have insomnia.      Objective:   BP (!) 155/82 (BP Location: Right arm, Patient Position: Sitting)   Pulse 69   Temp 98.1 °F (36.7 °C) (Oral)   Resp 16   Ht 5' 5" (1.651 m)   Wt 81.5 kg (179 lb 9.6 oz)   SpO2 99%   BMI 29.89 kg/m²     Physical Exam: Patient is vitally stable with no acute distress    Assessment:       ICD-10-CM ICD-9-CM   1. Type 2 diabetes mellitus with diabetic polyneuropathy, with long-term current use of insulin  E11.42 250.60    Z79.4 357.2     V58.67   2. Breast cancer screening by mammogram  Z12.31 V76.12       Plan:     Patient with diabetes with last A1c of 7.1 is not at goal  Patient is using 86% of insulin as basal and 14% only as bolus  More boluses are needed  Patient is now switch to automated mode.  I am expecting more frequent boluses and better blood sugar control  Advised patient to bolus frequently specially before meals.  Patient is on Trulicity but is constantly gaining weight  Will try to switch it to Ozempic..  Close follow-up is recommended  Patient has hypertension and FSGS  Blood pressure is running high secondary to missing benazepril.  Will refill medication  Patient has left shoulder pain and is under care of chiropractor  Patient reports treatment is helping advised patient to continue treatment  Will order mammogram, mammogram is scheduled for January  Patient had colonoscopy in January/2024.       Medication List with Changes/Refills   Current Medications    ALBUTEROL (PROVENTIL/VENTOLIN HFA) 90 MCG/ACTUATION INHALER        ATORVASTATIN (LIPITOR) 80 MG TABLET    Take " 1 tablet by mouth once daily    BENAZEPRIL (LOTENSIN) 40 MG TABLET    Take 40 mg by mouth once daily.    BLOOD-GLUCOSE SENSOR (DEXCOM G7 SENSOR) RIDDHI    1 each by Misc.(Non-Drug; Combo Route) route every 10 days.    BUDESONIDE-FORMOTEROL 160-4.5 MCG (SYMBICORT) 160-4.5 MCG/ACTUATION HFAA    Inhale 2 puffs into the lungs 2 (two) times a day.    BUSPIRONE (BUSPAR) 10 MG TABLET    Take 10 mg by mouth every morning.    DULAGLUTIDE (TRULICITY) 3 MG/0.5 ML PEN INJECTOR    INJECT  3 MG  SUBCUTANEOUSLY ONCE A WEEK    EPINEPHRINE (EPIPEN) 0.3 MG/0.3 ML ATIN        EZETIMIBE (ZETIA) 10 MG TABLET    Take 1 tablet by mouth once daily    FLUTICASONE PROPIONATE (FLONASE) 50 MCG/ACTUATION NASAL SPRAY        GLUCAGON (BAQSIMI) 3 MG/ACTUATION SPRY    1 spray by Nasal route daily as needed (hypoglycemia).    INSULIN ASPART U-100 (NOVOLOG U-100 INSULIN ASPART) 100 UNIT/ML INJECTION    Use insulin according to insulin pump, 75 units/day.    INSULIN PUMP CART,AUTO,BT-CNTR (OMNIPOD 5 G6 INTRO KIT, GEN 5,) CRTG    1 kit by Other route once daily.    INSULIN PUMP CART,AUTOMATED,BT (OMNIPOD 5 G6 PODS, GEN 5,) CRTG    Inject 1 each into the skin every 48 hours.    JARDIANCE 25 MG TABLET    Take 1 tablet by mouth once daily    LEVOTHYROXINE (SYNTHROID) 150 MCG TABLET    TAKE 1 TABLET BY MOUTH ONCE DAILY BEFORE  BREAKFAST    LOSARTAN (COZAAR) 25 MG TABLET    Take 25 mg by mouth.    MAGNESIUM OXIDE 500 MG CAP    Take 1 tablet by mouth once daily.    MONTELUKAST (SINGULAIR) 10 MG TABLET    Take 1 tablet by mouth every evening.    ONDANSETRON (ZOFRAN-ODT) 4 MG TBDL    Take 4 mg by mouth 2 (two) times daily.    PANTOPRAZOLE (PROTONIX) 40 MG TABLET    Take 40 mg by mouth once daily.   Discontinued Medications    KETOCONAZOLE (NIZORAL) 2 % CREAM    Apply topically once daily. for 14 days    OMEPRAZOLE (PRILOSEC) 40 MG CAPSULE    Take 1 capsule (40 mg total) by mouth once daily.

## 2024-12-04 NOTE — TELEPHONE ENCOUNTER
PA HAS BEEN COMPLETED FOR HER OZEMPIC AND SENT TO HER INS--NOW WAITING ON A REPLY WHICH CAN TAKE UP TO 72 HRS

## 2024-12-05 ENCOUNTER — TELEPHONE (OUTPATIENT)
Dept: PRIMARY CARE CLINIC | Facility: CLINIC | Age: 59
End: 2024-12-05
Payer: MEDICAID

## 2024-12-05 NOTE — TELEPHONE ENCOUNTER
Pt and pharmacy has been informed of approval of her Ozempic --approval dates are 12/4/24-12/04/25

## 2024-12-09 ENCOUNTER — PATIENT MESSAGE (OUTPATIENT)
Dept: PRIMARY CARE CLINIC | Facility: CLINIC | Age: 59
End: 2024-12-09
Payer: MEDICAID

## 2024-12-09 DIAGNOSIS — E11.42 TYPE 2 DIABETES MELLITUS WITH DIABETIC POLYNEUROPATHY, WITH LONG-TERM CURRENT USE OF INSULIN: ICD-10-CM

## 2024-12-09 DIAGNOSIS — Z79.4 TYPE 2 DIABETES MELLITUS WITH DIABETIC POLYNEUROPATHY, WITH LONG-TERM CURRENT USE OF INSULIN: ICD-10-CM

## 2024-12-09 DIAGNOSIS — E78.2 MIXED HYPERLIPIDEMIA: ICD-10-CM

## 2024-12-09 RX ORDER — BENAZEPRIL HYDROCHLORIDE 40 MG/1
40 TABLET ORAL DAILY
Qty: 90 TABLET | Refills: 3 | Status: SHIPPED | OUTPATIENT
Start: 2024-12-09

## 2024-12-09 RX ORDER — EMPAGLIFLOZIN 25 MG/1
25 TABLET, FILM COATED ORAL
Qty: 30 TABLET | Refills: 3 | Status: SHIPPED | OUTPATIENT
Start: 2024-12-09

## 2024-12-09 RX ORDER — EZETIMIBE 10 MG/1
10 TABLET ORAL
Qty: 30 TABLET | Refills: 3 | Status: SHIPPED | OUTPATIENT
Start: 2024-12-09

## 2024-12-09 RX ORDER — ATORVASTATIN CALCIUM 80 MG/1
80 TABLET, FILM COATED ORAL
Qty: 30 TABLET | Refills: 3 | Status: SHIPPED | OUTPATIENT
Start: 2024-12-09

## 2024-12-11 ENCOUNTER — PATIENT MESSAGE (OUTPATIENT)
Dept: PRIMARY CARE CLINIC | Facility: CLINIC | Age: 59
End: 2024-12-11
Payer: MEDICAID

## 2024-12-11 NOTE — TELEPHONE ENCOUNTER
"Patient sent a msg via [employer] regarding pre op clearance for procedure on 12/16/24. Patient's lab results are in chart for review. Cannot open EKG in media, phoned Dr. Pacheco's office to fax a copy.   "Hello, Dr Cryer office just called and stated that they sent over several medical clearances for my surgery on Monday 12/16/24 and they have not received it back yet.      This is urgent.   Please return the form today.      I didnt speak with Dr Cunha and  Juani at my last doctors appointment about the clearance."  "

## 2024-12-11 NOTE — TELEPHONE ENCOUNTER
If I remember right you told me that Podiatrist is taking care of EKG + pre-op labs and everything.  I have just seen the labs that are done on 12/09 ( we were not told labs are done and need to be reviewed). I still do not have access to EKG. Do you know how will the insulin pump be managed perioperatively.   I think all these things need to be reviewed and that will need an appointment. Will try to accommodate in next day or two so we can take care of things in timely manner.

## 2024-12-12 NOTE — TELEPHONE ENCOUNTER
I talked to Dr. Cryer early in the morning and he reported no medical clearance as needed for the procedure.  Please let us know if you need anything from my office.  I wish you Best of luck for the surgery

## 2024-12-17 DIAGNOSIS — E03.9 ACQUIRED HYPOTHYROIDISM: ICD-10-CM

## 2024-12-17 RX ORDER — LEVOTHYROXINE SODIUM 150 UG/1
150 TABLET ORAL
Qty: 30 TABLET | Refills: 2 | Status: SHIPPED | OUTPATIENT
Start: 2024-12-17

## 2024-12-20 ENCOUNTER — OFFICE VISIT (OUTPATIENT)
Dept: PRIMARY CARE CLINIC | Facility: CLINIC | Age: 59
End: 2024-12-20
Payer: MEDICAID

## 2024-12-20 VITALS
OXYGEN SATURATION: 99 % | BODY MASS INDEX: 29.82 KG/M2 | SYSTOLIC BLOOD PRESSURE: 118 MMHG | HEIGHT: 65 IN | WEIGHT: 179 LBS | HEART RATE: 85 BPM | RESPIRATION RATE: 16 BRPM | TEMPERATURE: 98 F | DIASTOLIC BLOOD PRESSURE: 83 MMHG

## 2024-12-20 DIAGNOSIS — E11.42 TYPE 2 DIABETES MELLITUS WITH DIABETIC POLYNEUROPATHY, WITH LONG-TERM CURRENT USE OF INSULIN: Primary | ICD-10-CM

## 2024-12-20 DIAGNOSIS — Z79.4 TYPE 2 DIABETES MELLITUS WITH DIABETIC POLYNEUROPATHY, WITH LONG-TERM CURRENT USE OF INSULIN: Primary | ICD-10-CM

## 2024-12-20 LAB — GLUCOSE SERPL-MCNC: 178 MG/DL (ref 70–110)

## 2024-12-20 RX ORDER — SUMATRIPTAN SUCCINATE 100 MG/1
100 TABLET ORAL DAILY PRN
COMMUNITY

## 2024-12-20 RX ORDER — ONDANSETRON 4 MG/1
4 TABLET, FILM COATED ORAL EVERY 8 HOURS PRN
COMMUNITY
End: 2024-12-20

## 2024-12-20 RX ORDER — OXYCODONE AND ACETAMINOPHEN 5; 325 MG/1; MG/1
1 TABLET ORAL EVERY 6 HOURS PRN
COMMUNITY
Start: 2024-12-13

## 2024-12-20 NOTE — PROGRESS NOTES
"  Subjective:      Patient ID: Nati Buenrostro is a 59 y.o. female.    Chief Complaint: Diabetes (2wk f/u )    Patient with type 2 Diabetes with last A1c of 7.1 worsen from 6.6.  Patient has CGM and the data is downloaded that suggest average blood sugar is running 197 with standard deviation of 52.  Patient blood sugars are in range 42% of the time running high 43% of the time and very high 15% of the time.  Patient was started on Ozempic and that seems to be suppressing appetite.  Patient has only 1 injection so far.  Patient Omnipod and CGM are now compatible and patient is on automated mode.     Review of Systems   Constitutional:  Negative for chills, diaphoresis, fever, malaise/fatigue and weight loss.   HENT:  Negative for congestion, ear pain, sinus pain, sore throat and tinnitus.    Eyes:  Negative for blurred vision and photophobia.   Respiratory:  Negative for cough, hemoptysis, shortness of breath and wheezing.    Cardiovascular:  Negative for chest pain, palpitations, orthopnea, leg swelling and PND.   Gastrointestinal:  Negative for abdominal pain, blood in stool, constipation, diarrhea, heartburn, melena, nausea and vomiting.   Genitourinary:  Negative for dysuria, frequency and urgency.   Musculoskeletal:  Negative for back pain, myalgias and neck pain.        Foot pain    Skin:  Negative for rash.   Neurological:  Negative for dizziness, tremors, seizures, loss of consciousness and weakness.   Endo/Heme/Allergies:  Negative for polydipsia.   Psychiatric/Behavioral:  Negative for depression and hallucinations. The patient does not have insomnia.      Objective:   /83 (BP Location: Right arm, Patient Position: Sitting)   Pulse 85   Temp 97.7 °F (36.5 °C) (Oral)   Resp 16   Ht 5' 5" (1.651 m)   Wt 81.2 kg (179 lb)   SpO2 99%   BMI 29.79 kg/m²     Physical Exam: Patient is vitally stable with no acute distress    Assessment:       ICD-10-CM ICD-9-CM   1. Type 2 diabetes mellitus with diabetic " polyneuropathy, with long-term current use of insulin  E11.42 250.60    Z79.4 357.2     V58.67       Plan:     Patient with type 2 diabetes with last A1c of 7.1 is close to target  Patient blood sugars seem to be running high.  Patient is in automated mode 60% of the time and in manual mode 40% of the time  Also noted that patient is using 86% of insulin as basal and 14% as bolus only  Patient is also not bolusing before meals..   Advised patient to bolus before meals  Blood sugars are persistently running high during night will increase decrease insulin sensitive    Medication List with Changes/Refills   Current Medications    ALBUTEROL (PROVENTIL/VENTOLIN HFA) 90 MCG/ACTUATION INHALER        ATORVASTATIN (LIPITOR) 80 MG TABLET    Take 1 tablet by mouth once daily    BENAZEPRIL (LOTENSIN) 40 MG TABLET    Take 1 tablet (40 mg total) by mouth once daily.    BLOOD-GLUCOSE SENSOR (DEXCOM G7 SENSOR) RIDDHI    1 each by Misc.(Non-Drug; Combo Route) route every 10 days.    BUDESONIDE-FORMOTEROL 160-4.5 MCG (SYMBICORT) 160-4.5 MCG/ACTUATION HFAA    Inhale 2 puffs into the lungs 2 (two) times a day.    BUSPIRONE (BUSPAR) 10 MG TABLET    Take 10 mg by mouth every morning.    EMPAGLIFLOZIN (JARDIANCE) 25 MG TABLET    Take 1 tablet by mouth once daily    EPINEPHRINE (EPIPEN) 0.3 MG/0.3 ML ATIN        EZETIMIBE (ZETIA) 10 MG TABLET    Take 1 tablet by mouth once daily    FLUTICASONE PROPIONATE (FLONASE) 50 MCG/ACTUATION NASAL SPRAY        GLUCAGON (BAQSIMI) 3 MG/ACTUATION SPRY    1 spray by Nasal route daily as needed (hypoglycemia).    INSULIN ASPART U-100 (NOVOLOG U-100 INSULIN ASPART) 100 UNIT/ML INJECTION    Use insulin according to insulin pump, 75 units/day.    INSULIN PUMP CART,AUTO,BT-CNTR (OMNIPOD 5 G6 INTRO KIT, GEN 5,) CRTG    1 kit by Other route once daily.    INSULIN PUMP CART,AUTOMATED,BT (OMNIPOD 5 G6 PODS, GEN 5,) CRTG    Inject 1 each into the skin every 48 hours.    LEVOTHYROXINE (SYNTHROID) 150 MCG TABLET     Take 1 tablet (150 mcg total) by mouth before breakfast.    LOSARTAN (COZAAR) 25 MG TABLET    Take 25 mg by mouth.    MAGNESIUM OXIDE 500 MG CAP    Take 1 tablet by mouth once daily.    MONTELUKAST (SINGULAIR) 10 MG TABLET    Take 1 tablet by mouth every evening.    OXYCODONE-ACETAMINOPHEN (PERCOCET) 5-325 MG PER TABLET    Take 1 tablet by mouth every 6 (six) hours as needed.    PANTOPRAZOLE (PROTONIX) 40 MG TABLET    Take 40 mg by mouth once daily.    SEMAGLUTIDE (OZEMPIC) 0.25 MG OR 0.5 MG (2 MG/3 ML) PEN INJECTOR    Inject 0.25 mg into the skin every 7 days.    SUMATRIPTAN (IMITREX) 100 MG TABLET    Take 100 mg by mouth daily as needed.   Discontinued Medications    ONDANSETRON (ZOFRAN) 4 MG TABLET    Take 4 mg by mouth every 8 (eight) hours as needed.    ONDANSETRON (ZOFRAN-ODT) 4 MG TBDL    Take 4 mg by mouth 2 (two) times daily.

## 2025-01-17 DIAGNOSIS — Z79.4 TYPE 2 DIABETES MELLITUS WITH DIABETIC POLYNEUROPATHY, WITH LONG-TERM CURRENT USE OF INSULIN: ICD-10-CM

## 2025-01-17 DIAGNOSIS — E11.42 TYPE 2 DIABETES MELLITUS WITH DIABETIC POLYNEUROPATHY, WITH LONG-TERM CURRENT USE OF INSULIN: ICD-10-CM

## 2025-01-22 DIAGNOSIS — Z79.4 TYPE 2 DIABETES MELLITUS WITH DIABETIC POLYNEUROPATHY, WITH LONG-TERM CURRENT USE OF INSULIN: ICD-10-CM

## 2025-01-22 DIAGNOSIS — E11.42 TYPE 2 DIABETES MELLITUS WITH DIABETIC POLYNEUROPATHY, WITH LONG-TERM CURRENT USE OF INSULIN: ICD-10-CM

## 2025-01-23 RX ORDER — SEMAGLUTIDE 0.68 MG/ML
INJECTION, SOLUTION SUBCUTANEOUS
Qty: 3 ML | Refills: 0 | Status: SHIPPED | OUTPATIENT
Start: 2025-01-23

## 2025-01-24 RX ORDER — BLOOD-GLUCOSE SENSOR
EACH MISCELLANEOUS
Qty: 3 EACH | Refills: 0 | Status: SHIPPED | OUTPATIENT
Start: 2025-01-24

## 2025-02-20 ENCOUNTER — TELEPHONE (OUTPATIENT)
Dept: PRIMARY CARE CLINIC | Facility: CLINIC | Age: 60
End: 2025-02-20
Payer: MEDICAID

## 2025-02-20 DIAGNOSIS — Z79.4 TYPE 2 DIABETES MELLITUS WITH DIABETIC POLYNEUROPATHY, WITH LONG-TERM CURRENT USE OF INSULIN: ICD-10-CM

## 2025-02-20 DIAGNOSIS — E11.42 TYPE 2 DIABETES MELLITUS WITH DIABETIC POLYNEUROPATHY, WITH LONG-TERM CURRENT USE OF INSULIN: ICD-10-CM

## 2025-02-20 RX ORDER — BLOOD-GLUCOSE SENSOR
EACH MISCELLANEOUS
Qty: 3 EACH | Refills: 0 | Status: SHIPPED | OUTPATIENT
Start: 2025-02-20

## 2025-02-24 DIAGNOSIS — E11.42 TYPE 2 DIABETES MELLITUS WITH DIABETIC POLYNEUROPATHY, WITH LONG-TERM CURRENT USE OF INSULIN: Primary | ICD-10-CM

## 2025-02-24 DIAGNOSIS — Z79.4 TYPE 2 DIABETES MELLITUS WITH DIABETIC POLYNEUROPATHY, WITH LONG-TERM CURRENT USE OF INSULIN: Primary | ICD-10-CM

## 2025-02-24 RX ORDER — INSULIN ASPART 100 [IU]/ML
INJECTION, SOLUTION INTRAVENOUS; SUBCUTANEOUS
Qty: 10 ML | Refills: 0 | Status: SHIPPED | OUTPATIENT
Start: 2025-02-24

## 2025-03-31 DIAGNOSIS — J45.20 MILD INTERMITTENT ASTHMA, UNSPECIFIED WHETHER COMPLICATED: Primary | ICD-10-CM

## 2025-03-31 RX ORDER — MONTELUKAST SODIUM 10 MG/1
10 TABLET ORAL NIGHTLY
Qty: 30 TABLET | Refills: 2 | Status: SHIPPED | OUTPATIENT
Start: 2025-03-31 | End: 2025-06-29

## 2025-05-13 DIAGNOSIS — Z79.4 TYPE 2 DIABETES MELLITUS WITH DIABETIC POLYNEUROPATHY, WITH LONG-TERM CURRENT USE OF INSULIN: ICD-10-CM

## 2025-05-13 DIAGNOSIS — E11.42 TYPE 2 DIABETES MELLITUS WITH DIABETIC POLYNEUROPATHY, WITH LONG-TERM CURRENT USE OF INSULIN: ICD-10-CM

## 2025-05-14 RX ORDER — BLOOD-GLUCOSE SENSOR
EACH MISCELLANEOUS
Qty: 3 EACH | Refills: 0 | Status: SHIPPED | OUTPATIENT
Start: 2025-05-14

## 2025-05-27 DIAGNOSIS — E11.42 TYPE 2 DIABETES MELLITUS WITH DIABETIC POLYNEUROPATHY, WITH LONG-TERM CURRENT USE OF INSULIN: ICD-10-CM

## 2025-05-27 DIAGNOSIS — Z79.4 TYPE 2 DIABETES MELLITUS WITH DIABETIC POLYNEUROPATHY, WITH LONG-TERM CURRENT USE OF INSULIN: ICD-10-CM

## 2025-05-27 RX ORDER — INSULIN ASPART 100 [IU]/ML
INJECTION, SOLUTION INTRAVENOUS; SUBCUTANEOUS
Qty: 10 ML | Refills: 0 | Status: SHIPPED | OUTPATIENT
Start: 2025-05-27

## 2025-06-24 DIAGNOSIS — J45.20 MILD INTERMITTENT ASTHMA, UNSPECIFIED WHETHER COMPLICATED: ICD-10-CM

## 2025-06-25 RX ORDER — MONTELUKAST SODIUM 10 MG/1
10 TABLET ORAL NIGHTLY
Qty: 30 TABLET | Refills: 3 | Status: SHIPPED | OUTPATIENT
Start: 2025-06-25

## 2025-07-02 DIAGNOSIS — R91.1 SOLITARY PULMONARY NODULE: Primary | ICD-10-CM

## 2025-07-16 ENCOUNTER — TELEPHONE (OUTPATIENT)
Dept: GASTROENTEROLOGY | Facility: CLINIC | Age: 60
End: 2025-07-16
Payer: MEDICAID

## 2025-07-16 NOTE — TELEPHONE ENCOUNTER
Copied from CRM #[id number]. Topic: Appointments - Appointment Access  >> Jul 16, 2025  3:36 PM Elenita wrote:  .Type:  Patient Requesting Call    Who Called:Nati Buenrostro  Would the patient rather a call back or a response via [facility]? Call  Best Call Back Number:[phone] (home)  Additional Information: Patient called to schedule a colonoscopy

## 2025-07-17 ENCOUNTER — TELEPHONE (OUTPATIENT)
Dept: SURGERY | Facility: CLINIC | Age: 60
End: 2025-07-17
Payer: MEDICAID

## 2025-07-17 ENCOUNTER — TELEPHONE (OUTPATIENT)
Dept: GASTROENTEROLOGY | Facility: CLINIC | Age: 60
End: 2025-07-17
Payer: MEDICAID

## 2025-07-17 NOTE — TELEPHONE ENCOUNTER
Copied from CRM #[id number]. Topic: Appointments - Amb Referral  >> Jul 17, 2025  1:10 PM Marianna wrote:  Pt calling for status of her referral appt and can be reached at [phone]. Pt call yesterday and message went to wrong office.        Thanks,  >> Jul 17, 2025  3:00 PM Med Assistant Mingo wrote:  Pt states she needs to come back in for her yearly colonoscopy for family hx of cancer, pt scheduled for 8/11

## 2025-08-07 ENCOUNTER — OFFICE VISIT (OUTPATIENT)
Dept: PULMONOLOGY | Facility: CLINIC | Age: 60
End: 2025-08-07
Payer: MEDICAID

## 2025-08-07 VITALS
HEIGHT: 65 IN | WEIGHT: 168 LBS | SYSTOLIC BLOOD PRESSURE: 137 MMHG | HEART RATE: 80 BPM | RESPIRATION RATE: 18 BRPM | OXYGEN SATURATION: 98 % | BODY MASS INDEX: 27.99 KG/M2 | DIASTOLIC BLOOD PRESSURE: 76 MMHG

## 2025-08-07 DIAGNOSIS — J45.20 MILD INTERMITTENT ASTHMA, UNSPECIFIED WHETHER COMPLICATED: ICD-10-CM

## 2025-08-07 DIAGNOSIS — R91.1 LUNG NODULE: Primary | ICD-10-CM

## 2025-08-07 RX ORDER — MONTELUKAST SODIUM 10 MG/1
10 TABLET ORAL NIGHTLY
Qty: 30 TABLET | Refills: 3 | Status: SHIPPED | OUTPATIENT
Start: 2025-08-07

## 2025-08-07 RX ORDER — BUDESONIDE AND FORMOTEROL FUMARATE DIHYDRATE 160; 4.5 UG/1; UG/1
2 AEROSOL RESPIRATORY (INHALATION) 2 TIMES DAILY
Qty: 6 G | Refills: 11 | Status: SHIPPED | OUTPATIENT
Start: 2025-08-07

## 2025-08-07 RX ORDER — ALBUTEROL SULFATE 90 UG/1
1-2 INHALANT RESPIRATORY (INHALATION) EVERY 6 HOURS PRN
Qty: 18 G | Refills: 11 | Status: SHIPPED | OUTPATIENT
Start: 2025-08-07

## 2025-08-07 NOTE — ASSESSMENT & PLAN NOTE
Well controlled with symbicort daily inhaler and albuterol rescue inhaler prn.   We will continue current regimen.  She should continue Flonase and singulair to help with rhinitis like symptoms.

## 2025-08-07 NOTE — PROGRESS NOTES
Subjective:    Patient Identification:  Patient ID: Nati Buenrostro is a 59 y.o. female.    Referring Provider: established patient    Chief Complaint:  lung nodule and asthma      History of Present Illness:    Nati Buenrostro is a 59 y.o. female who presents to clinic for annual follow up for stable left upper lobe 7 mm pulmonary nodule.  Please refer to Dr. Anand's previous office visit note in August 7, 2024 with extensive detailed evolution of pulmonary nodules.  Patient had been seeing MD Mora and has since been followed by our clinic for routine monitoring of her nodules.  Patient had CT of the chest in January of 2023 that showed stability of known older calcified left upper lobe nodule but there was new 5 mm lingular ground-glass nodule.  Repeat CT of the chest in August of 2024 showed stable 7 mm pulmonary nodule that has been unchanged from images in 2023 and 2019.  There was no other pulmonary nodule or alveolar opacity noted.  Repeat annual CT in July of 2025 again reflects stable 7 mm left upper lobe nodule without any other suspicious nodules.  These findings are reassuring.      Patient does have history of mild asthma for which she takes daily Symbicort 2 puffs twice daily and albuterol rescue inhaler as needed.  She uses this maybe twice a month.  Her asthma really only flares up in the heat of the summer and she mainly develops sinus congestion especially went around grass and trees.  She remedies this with Flonase and Singulair.  She denies shortness of breath, severe cough or persistent wheezing/chest tightness. She gets annual preventative vaccinations with her primary care Dr Cunha.      Review of Systems:  Review of Systems   Constitutional:  Negative for fever, chills, appetite change, night sweats and weakness.   HENT:  Negative for postnasal drip, rhinorrhea, sore throat, trouble swallowing, voice change, congestion and ear pain.    Respiratory:  Positive for use of rescue inhaler.  Negative for cough, hemoptysis, sputum production, chest tightness, shortness of breath, wheezing, previous hospitialization due to pulmonary problems and dyspnea on extertion.    Cardiovascular:  Negative for chest pain, palpitations and leg swelling.   Genitourinary:  Negative for difficulty urinating and hematuria.   Endocrine:  Negative for polydipsia, polyphagia, cold intolerance, heat intolerance and polyuria.    Musculoskeletal:  Negative for joint swelling and myalgias.   Skin:  Negative for rash.   Gastrointestinal:  Negative for nausea, vomiting, abdominal pain and abdominal distention.   Neurological:  Negative for dizziness, syncope, light-headedness and headaches.   Psychiatric/Behavioral:  Negative for confusion and sleep disturbance. The patient is not nervous/anxious.        Allergies:   Review of patient's allergies indicates:   Allergen Reactions    Vitamin d3-folic acid Other (See Comments)     Diarrhea    Beano [alpha-d-galactosidase] Diarrhea, Itching and Rash     Red meat allergy       Medications:      Past Medical History:      Past Medical History:   Diagnosis Date    Acquired hypothyroidism 5/3/2023    Asthma     Depression     Diabetes mellitus, type 2     Disorder of kidney and ureter     Focal segmental glomerulosclerosis 5/3/2023    GERD (gastroesophageal reflux disease)     Hyperlipidemia     Hypertension     Migraines     Thyroid disease     Type 2 diabetes mellitus with diabetic polyneuropathy, with long-term current use of insulin 5/3/2023       Family History:      Family History   Problem Relation Name Age of Onset    Hypertension Mother      Colon cancer Mother          Not sure of the age of diagnosis    Prostate cancer Mother          Not sure of the age of diagnosis    Hypertension Father      Ovarian cancer Father          Not sure of the age of diagnosis    Cervical cancer Sister  23    Prostate cancer Brother          Social History:      Past Surgical History:   Procedure  "Laterality Date    COLONOSCOPY  06/03/2022    HYSTERECTOMY      LAPAROSCOPIC SIGMOIDECTOMY         Physical Exam:  /76 (BP Location: Left forearm, Patient Position: Sitting)   Pulse 80   Resp 18   Ht 5' 5" (1.651 m)   Wt 76.2 kg (168 lb)   SpO2 98%   BMI 27.96 kg/m²   Physical Exam   Constitutional: She is oriented to person, place, and time. She appears well-developed and well-nourished. She appears not cachectic. No distress. She is not obese.   HENT:   Head: Normocephalic.   Right Ear: External ear normal.   Left Ear: External ear normal.   Cardiovascular: Normal rate, regular rhythm, normal heart sounds and intact distal pulses. Exam reveals no gallop and no friction rub.   No murmur heard.  Pulmonary/Chest: Normal expansion, symmetric chest wall expansion, effort normal and breath sounds normal. No respiratory distress. She has no decreased breath sounds. She has no wheezes. She has no rhonchi. She has no rales.   Abdominal: She exhibits no distension.   Musculoskeletal:         General: No edema. Normal range of motion.      Cervical back: Neck supple.   Neurological: She is alert and oriented to person, place, and time.   Skin: Skin is warm and dry. She is not diaphoretic. No cyanosis. No pallor. Nails show no clubbing.   Psychiatric: She has a normal mood and affect. Her behavior is normal.       CHEST THORAX WO CONT     Clinical indication: ABNORMAL XRAY: ABNORMAL XRAY     FINDINGS:     LUNGS: Well aerated. No significant alveolar opacity. 7 mm pulmonary nodule in the left upper lobe,   unchanged from prior CT 9/4/23 and 9/12/2023. No other pulmonary nodule identified.   HEART:  Normal size. No pericardial effusion.   GREAT VESSELS/AORTA:  No aneurysm.   LYMPH NODES:  No suspicious enlarged lymphadenopathy.   PLEURAL EFFUSION:  None.   OSSEOUS:  No acute findings. No suspicious or aggressive osseous abnormalities/ lesions.     IMPRESSION:   7 mm pulmonary nodule in the left upper lobe, unchanged " from prior CT 9/4/23 and 9/12/2023     Signer Name: Rene Espinoza MD   Signed: 8/16/2024 4:38 PM CDT           CT CHEST WO CONTRAST     HISTORY:  Pulmonary nodule.  ION protocol. 8.28 mSv   COMPARISON: 8/16/2024     FINDINGS:   Lower Neck: Normal appearance.   Heart: Normal in size. No pericardial effusion.   Aorta Normal in caliber.   Pulmonary Vasculature: The central pulmonary arteries are normal in caliber.   Mediastinum: Normal appearance. No pathologically enlarged lymph nodes.   Lungs: Calcified nodule in the lateral left upper lobe, measuring 7 mm. No new or otherwise suspicious nodules within either lung.   Pleural Space: No pneumothorax or pleural effusion   Bones/Soft Tissues: No fracture or malalignment of the thoracic spine or ribs. No suspicious osseous abnormalities.   Upper Abdomen: Bilobed hyperdense lesion in the upper right kidney. This is unchanged since 8/16/2024, and likely a hemorrhagic cyst     Impression  1.  Stable benign nodule in the left upper lobe  2.  No new abnormalities    Signer Name: Santos Pulido Jr, MD  Signed: 7/18/2025 3:14 PM CDT    Accessory Clinical Data:  Chest x-ray:    CT scan: CT in Aug 2024 and July 2025 - stable 7mm DOUG nodule    PFTs:     6MWT:     TTE:    Lab data:    All radiographic imaging of the chest, PFT tracings/data, and 6MWT data have been independently reviewed and interpreted unless otherwise specified.     Assessment and Plan:      Problem List Items Addressed This Visit       Mild intermittent asthma    Current Assessment & Plan   Well controlled with symbicort daily inhaler and albuterol rescue inhaler prn.   We will continue current regimen.  She should continue Flonase and singulair to help with rhinitis like symptoms.          Relevant Medications    budesonide-formoterol 160-4.5 mcg (SYMBICORT) 160-4.5 mcg/actuation HFAA    montelukast (SINGULAIR) 10 mg tablet    albuterol (PROVENTIL/VENTOLIN HFA) 90 mcg/actuation inhaler     Other Visit  Diagnoses         Lung nodule    -  Primary    Relevant Orders    CT Chest Without Contrast           Orders Placed This Encounter   Procedures    CT Chest Without Contrast     Standing Status:   Future     Expected Date:   7/7/2026     Expiration Date:   2/7/2027     May the Radiologist modify the order per protocol to meet the clinical needs of the patient?:   Yes     Is this a  Screening?:   No      As mentioned above previously seen 5 mm lingular ground-glass nodule from CT in January of 2023 has resolved.  Repeat imaging in August of 2024 shows stable 7 mm pulmonary nodule in the left upper lobe, which was again reflected in CT imaging in July of 2025 without any new nodules.  We will continue surveillance with yearly CT scans for a total of 5 years which was recommended by MD Mora.  Patient does have strong family history of cancer.  Plan for repeat CT of the chest Ion protocol in July of 2026.    Follow up in about 1 year (around 8/7/2026) for CT ION review (7mm DOUG SPN).

## 2025-08-11 ENCOUNTER — OFFICE VISIT (OUTPATIENT)
Dept: SURGERY | Facility: CLINIC | Age: 60
End: 2025-08-11
Payer: MEDICAID

## 2025-08-11 DIAGNOSIS — R91.1 SOLITARY PULMONARY NODULE: Primary | ICD-10-CM

## 2025-08-11 DIAGNOSIS — Z86.0100 HISTORY OF COLON POLYPS: Primary | ICD-10-CM

## 2025-08-11 DIAGNOSIS — Z80.0 FAMILY HISTORY OF COLON CANCER: ICD-10-CM

## 2025-08-11 PROCEDURE — 4010F ACE/ARB THERAPY RXD/TAKEN: CPT | Mod: CPTII,,, | Performed by: SURGERY

## 2025-08-11 PROCEDURE — 1159F MED LIST DOCD IN RCRD: CPT | Mod: CPTII,,, | Performed by: SURGERY

## 2025-08-11 PROCEDURE — 99213 OFFICE O/P EST LOW 20 MIN: CPT | Mod: S$PBB,,, | Performed by: SURGERY

## 2025-08-11 PROCEDURE — 3052F HG A1C>EQUAL 8.0%<EQUAL 9.0%: CPT | Mod: CPTII,,, | Performed by: SURGERY

## 2025-08-11 PROCEDURE — 1160F RVW MEDS BY RX/DR IN RCRD: CPT | Mod: CPTII,,, | Performed by: SURGERY

## 2025-08-11 RX ORDER — SOD SULF/POT CHLORIDE/MAG SULF 1.479 G
12 TABLET ORAL DAILY
Qty: 24 TABLET | Refills: 0 | Status: SHIPPED | OUTPATIENT
Start: 2025-08-11

## 2025-08-11 RX ORDER — SOD SULF/POT CHLORIDE/MAG SULF 1.479 G
12 TABLET ORAL DAILY
Qty: 24 TABLET | Refills: 0 | Status: CANCELLED | OUTPATIENT
Start: 2025-08-11

## 2025-08-27 ENCOUNTER — TELEPHONE (OUTPATIENT)
Dept: SURGERY | Facility: CLINIC | Age: 60
End: 2025-08-27
Payer: MEDICAID

## 2025-09-04 DIAGNOSIS — R91.1 SOLITARY PULMONARY NODULE: Primary | ICD-10-CM
